# Patient Record
Sex: MALE | Race: WHITE | NOT HISPANIC OR LATINO | Employment: OTHER | URBAN - METROPOLITAN AREA
[De-identification: names, ages, dates, MRNs, and addresses within clinical notes are randomized per-mention and may not be internally consistent; named-entity substitution may affect disease eponyms.]

---

## 2018-02-05 ENCOUNTER — OFFICE VISIT (OUTPATIENT)
Dept: URGENT CARE | Facility: CLINIC | Age: 43
End: 2018-02-05
Payer: COMMERCIAL

## 2018-02-05 VITALS
SYSTOLIC BLOOD PRESSURE: 131 MMHG | OXYGEN SATURATION: 98 % | RESPIRATION RATE: 18 BRPM | TEMPERATURE: 99 F | HEART RATE: 58 BPM | BODY MASS INDEX: 29.03 KG/M2 | WEIGHT: 196 LBS | DIASTOLIC BLOOD PRESSURE: 88 MMHG | HEIGHT: 69 IN

## 2018-02-05 DIAGNOSIS — R11.0 NAUSEA IN ADULT PATIENT: ICD-10-CM

## 2018-02-05 DIAGNOSIS — B34.9 ACUTE VIRAL SYNDROME: Primary | ICD-10-CM

## 2018-02-05 DIAGNOSIS — K21.9 MILD ACID REFLUX: ICD-10-CM

## 2018-02-05 LAB
CTP QC/QA: YES
FLUAV AG NPH QL: NEGATIVE
FLUBV AG NPH QL: NEGATIVE

## 2018-02-05 PROCEDURE — 96372 THER/PROPH/DIAG INJ SC/IM: CPT | Mod: S$GLB,,, | Performed by: EMERGENCY MEDICINE

## 2018-02-05 PROCEDURE — 3008F BODY MASS INDEX DOCD: CPT | Mod: S$GLB,,, | Performed by: NURSE PRACTITIONER

## 2018-02-05 PROCEDURE — 99203 OFFICE O/P NEW LOW 30 MIN: CPT | Mod: 25,S$GLB,, | Performed by: NURSE PRACTITIONER

## 2018-02-05 PROCEDURE — 87804 INFLUENZA ASSAY W/OPTIC: CPT | Mod: QW,S$GLB,, | Performed by: NURSE PRACTITIONER

## 2018-02-05 RX ORDER — BETAMETHASONE SODIUM PHOSPHATE AND BETAMETHASONE ACETATE 3; 3 MG/ML; MG/ML
6 INJECTION, SUSPENSION INTRA-ARTICULAR; INTRALESIONAL; INTRAMUSCULAR; SOFT TISSUE
Status: COMPLETED | OUTPATIENT
Start: 2018-02-05 | End: 2018-02-05

## 2018-02-05 RX ORDER — FLUTICASONE PROPIONATE 50 MCG
2 SPRAY, SUSPENSION (ML) NASAL DAILY
Qty: 1 BOTTLE | Refills: 0 | Status: SHIPPED | OUTPATIENT
Start: 2018-02-05 | End: 2018-09-21

## 2018-02-05 RX ORDER — ONDANSETRON 4 MG/1
4 TABLET, ORALLY DISINTEGRATING ORAL EVERY 6 HOURS PRN
Qty: 20 TABLET | Refills: 0 | Status: SHIPPED | OUTPATIENT
Start: 2018-02-05 | End: 2018-09-21

## 2018-02-05 RX ADMIN — BETAMETHASONE SODIUM PHOSPHATE AND BETAMETHASONE ACETATE 6 MG: 3; 3 INJECTION, SUSPENSION INTRA-ARTICULAR; INTRALESIONAL; INTRAMUSCULAR; SOFT TISSUE at 12:02

## 2018-02-05 NOTE — PATIENT INSTRUCTIONS
Zyrtec to dry up nasal drip  Sudafed from behind pharmacy counter to help with nasal congestion.        Follow up with your doctor in a few days as needed.  Return to the urgent care or go to the ER if symptoms get worse.       Tylenol every 4 hours and/or Ibuprofen every 6 hours for fever or pain.   salt water gargles  Cold-eeze helps to reduce the duration of sore throat symptoms  Cepachol helps to numb the discomfort  Chloroseptic spray  Nasal saline spray reduces inflammation and dryness  Warm face compresses as often as you can  Vicks vapor rub at night  Flonase OTC or Nasacort OTC  Simple foods like chicken noodle soup help  Mucinex DM or use Coricidin HBP if you have hypertension  Zyrtec/Claritin/Xyzal during the day and Benadryl at night may help if allergy component   Pedialyte helps with dehydration if lacking appetite  Rest as much as you can      ABDOMINAL PAIN          Based on your visit today, the exact cause of your abdominal (stomach) pain is not certain. Your condition does not seem serious now; however, the signs of a serious problem may take more time to appear. Therefore, it is important for you to watch for any new symptoms or worsening of your condition as we discussed in the clinic.      HOME CARE:  1. Rest until your next exam. No strenuous activities.  2. Eat a diet low in fiber (called a low-residue diet). Foods allowed include refined breads, white rice, fruit and vegetable juices without pulp, tender meats. These foods will pass more easily through the intestine.  3. Avoid whole-grain foods, whole fruits and vegetables, meats, seeds and nuts, fried or fatty foods, dairy, alcohol and spicy foods until your symptoms go away.     FOLLOW UP with your doctor or this facility as instructed, or if your pain does not begin to improve in the next 24 hours.     GET PROMPT MEDICAL ATTENTION if any of the following occur:  Pain gets worse or moves to the right lower abdomen  New or worsening  vomiting or diarrhea  Swelling of the abdomen  Unable to pass stool for more than three days  New fever over 100.0º F (37.8ºC), or rising fever  Blood in vomit or bowel movements (dark red or black color)  Jaundice (yellow color of eyes and skin)  Weakness, dizziness or fainting  Chest, arm, back, neck or jaw pain  Unexpected vaginal bleeding or missed period        GERD (Adult)    The esophagus is a tube that carries food from the mouth to the stomach. A valve at the lower end of the esophagus prevents stomach acid from flowing upward. When this valve doesn't work properly, stomach contents may repeatedly flow back up (reflux) into the esophagus. This is called gastroesophageal reflux disease (GERD). GERD can irritate the esophagus. It can cause problems with swallowing or breathing. In severe cases, GERD can cause recurrent pneumonia or other serious problems.  Symptoms of reflux include burning, pressure or sharp pain in the upper abdomen or mid to lower chest. The pain can spread to the neck, back, or shoulder. There may be belching, an acid taste in the back of the throat, chronic cough, or sore throat or hoarseness. GERD symptoms often occur during the day after a big meal. They can also occur at night when lying down.   Home care  Lifestyle changes can help reduce symptoms. If needed, medicines may be prescribed. Symptoms often improve with treatment, but if treatment is stopped, the symptoms often return after a few months. So most persons with GERD will need to continue treatment.  Lifestyle changes  · Limit or avoid fatty, fried, and spicy foods, as well as coffee, chocolate, mint, and foods with high acid content such as tomatoes and citrus fruit and juices (orange, grapefruit, lemon).  · Dont eat large meals, especially at night. Frequent, smaller meals are best. Do not lie down right after eating. And dont eat anything 3 hours before going to bed.  · Avoid drinking alcohol and smoking. As much as  "possible, stay away from second hand smoke.  · If you are overweight, losing weight will reduce symptoms.   · Avoid wearing tight clothing around your stomach area.  · If your symptoms occur during sleep, use a foam wedge to elevate your upper body (not just your head.) Or, place 4" blocks under the head of your bed.  Medicines  If needed, medicines can help relieve the symptoms of GERD and prevent damage to the esophagus. Discuss a medicine plan with your healthcare provider. This may include one or more of the following medicines:  · Antacids to help neutralize the normal acids in your stomach.  · Acid blockers (H2 blockers) to decrease acid production.  · Acid inhibitors (PPIs) to decrease acid production in a different way than the blockers. They may work better, but can take a little longer to take effect.  Take an antacid 30-60 minutes after eating and at bedtime, but not at the same time as an acid blocker.  Try not to take medicines such as ibuprofen and aspirin. If you are taking aspirin for your heart or other medical reasons, talk to your healthcare provider about stopping it.  Follow-up care  Follow up with your healthcare provider or as advised by our staff.  When to seek medical advice  Call your healthcare provider if any of the following occur:  · Stomach pain gets worse or moves to the lower right abdomen (appendix area)  · Chest pain appears or gets worse, or spreads to the back, neck, shoulder, or arm  · Frequent vomiting (cant keep down liquids)  · Blood in the stool or vomit (red or black in color)  · Feeling weak or dizzy  · Fever of 100.4ºF (38ºC) or higher, or as directed by your healthcare provider  Date Last Reviewed: 6/23/2015  © 0417-5441 [employer]. [address] [place] [address]. All rights reserved. This information is not intended as a substitute for professional medical care. Always follow your healthcare professional's instructions.        Viral Syndrome " "(Adult)  A viral illness may cause a number of symptoms. The symptoms depend on the part of the body that the virus affects. If it settles in your nose, throat, and lungs, it may cause cough, sore throat, congestion, and sometimes headache. If it settles in your stomach and intestinal tract, it may cause vomiting and diarrhea. Sometimes it causes vague symptoms like "aching all over," feeling tired, loss of appetite, or fever.  A viral illness usually lasts 1 to 2 weeks, but sometimes it lasts longer. In some cases, a more serious infection can look like a viral syndrome in the first few days of the illness. You may need another exam and additional tests to know the difference. Watch for the warning signs listed below.  Home care  Follow these guidelines for taking care of yourself at home:  · If symptoms are severe, rest at home for the first 2 to 3 days.  · Stay away from cigarette smoke - both your smoke and the smoke from others.  · You may use over-the-counter acetaminophen or ibuprofen for fever, muscle aching, and headache, unless another medicine was prescribed for this. If you have chronic liver or kidney disease or ever had a stomach ulcer or GI bleeding, talk with your doctor before using these medicines. No one who is younger than 18 and ill with a fever should take aspirin. It may cause severe disease or death.  · Your appetite may be poor, so a light diet is fine. Avoid dehydration by drinking 8 to 12 8-ounce glasses of fluids each day. This may include water; orange juice; lemonade; apple, grape, and cranberry juice; clear fruit drinks; electrolyte replacement and sports drinks; and decaffeinated teas and coffee. If you have been diagnosed with a kidney disease, ask your doctor how much and what types of fluids you should drink to prevent dehydration. If you have kidney disease, drinking too much fluid can cause it build up in the your body and be dangerous to your health.  · Over-the-counter remedies " won't shorten the length of the illness but may be helpful for cough, sore throat; and nasal and sinus congestion. Don't use decongestants if you have high blood pressure.  Follow-up care  Follow up with your healthcare provider if you do not improve over the next week.  Call 911  Get emergency medical care if any of the following occur:  · Convulsion  · Feeling weak, dizzy, or like you are going to faint  · Chest pain, shortness of breath, wheezing, or difficulty breathing  When to seek medical advice  Call your healthcare provider right away if any of these occur:  · Cough with lots of colored sputum (mucus) or blood in your sputum  · Chest pain, shortness of breath, wheezing, or difficulty breathing  · Severe headache; face, neck, or ear pain  · Severe, constant pain in the lower right side of your belly (abdominal)  · Continued vomiting (cant keep liquids down)  · Frequent diarrhea (more than 5 times a day); blood (red or black color) or mucus in diarrhea  · Feeling weak, dizzy, or like you are going to faint  · Extreme thirst  · Fever of 100.4°F (38°C) or higher, or as directed by your healthcare provider  Date Last Reviewed: 9/25/2015  © 3084-6943 [employer]. [address] [place] [address], [place] [address]. All rights reserved. This information is not intended as a substitute for professional medical care. Always follow your healthcare professional's instructions.

## 2018-02-05 NOTE — PROGRESS NOTES
"Subjective:       Patient ID: James Royden Peabody IV is a 42 y.o. male.    Vitals:  height is 5' 9" (1.753 m) and weight is 88.9 kg (196 lb). His oral temperature is 98.5 °F (36.9 °C). His blood pressure is 131/88 and his pulse is 58 (abnormal). His respiration is 18 and oxygen saturation is 98%.     Chief Complaint: Sinus Problem    Pt c/o the symptoms have been going on for 2-3 days.  Pt states the abdominal cramps/acid reflux started last night. + diarrhea for the last 2 days. 2-3 episodes per day. Denies blood in stool.   + nausea denies vomiting.   + URI s/s as well. Recently traveled in an air plane.           Sinus Problem   This is a new problem. The current episode started in the past 7 days (3 days ago). The problem is unchanged. There has been no fever. The fever has been present for less than 1 day. His pain is at a severity of 7/10. The pain is moderate. Associated symptoms include congestion and coughing. Pertinent negatives include no chills, ear pain, headaches, hoarse voice, shortness of breath or sore throat. Treatments tried: mucinex, advil cold and sinus. The treatment provided mild relief.     Review of Systems   Constitution: Negative for chills, fever and malaise/fatigue.        Body aches   HENT: Positive for congestion. Negative for ear pain, hoarse voice and sore throat.    Eyes: Negative for discharge and redness.   Cardiovascular: Negative for chest pain, dyspnea on exertion and leg swelling.   Respiratory: Positive for cough. Negative for shortness of breath, sputum production and wheezing.    Musculoskeletal: Negative for myalgias.   Gastrointestinal: Positive for abdominal pain, diarrhea and nausea.        Heartburn   Neurological: Negative for headaches.       Objective:      Physical Exam   Constitutional: He is oriented to person, place, and time. He appears well-developed and well-nourished. He is cooperative.  Non-toxic appearance. He does not appear ill. No distress.   HENT: "   Head: Normocephalic and atraumatic.   Right Ear: Hearing, tympanic membrane, external ear and ear canal normal.   Left Ear: Hearing, tympanic membrane, external ear and ear canal normal.   Nose: Mucosal edema (mild) and rhinorrhea present. No nasal deformity. No epistaxis. Right sinus exhibits no maxillary sinus tenderness and no frontal sinus tenderness. Left sinus exhibits no maxillary sinus tenderness and no frontal sinus tenderness.   Mouth/Throat: Uvula is midline and mucous membranes are normal. No trismus in the jaw. Normal dentition. No uvula swelling. Posterior oropharyngeal erythema (mild) present. No oropharyngeal exudate or posterior oropharyngeal edema.   + hoarseness   + PND    Eyes: Conjunctivae and lids are normal. Pupils are equal, round, and reactive to light. No scleral icterus.   Sclera clear bilat   Neck: Trachea normal, normal range of motion, full passive range of motion without pain and phonation normal. Neck supple.   Cardiovascular: Normal rate, regular rhythm, normal heart sounds, intact distal pulses and normal pulses.    Pulmonary/Chest: Effort normal and breath sounds normal. No respiratory distress.   Abdominal: Soft. Normal appearance and bowel sounds are normal. He exhibits no distension, no abdominal bruit, no pulsatile midline mass and no mass. There is tenderness in the epigastric area. There is no rigidity, no rebound, no guarding, no tenderness at McBurney's point and negative Luciano's sign.   Musculoskeletal: Normal range of motion. He exhibits no edema or deformity.   Neurological: He is alert and oriented to person, place, and time. He has normal strength. He exhibits normal muscle tone. Coordination normal.   Skin: Skin is warm, dry and intact. He is not diaphoretic. No pallor.   Psychiatric: He has a normal mood and affect. His speech is normal and behavior is normal. Judgment and thought content normal. Cognition and memory are normal.   Nursing note and vitals  reviewed.        Results for orders placed or performed in visit on 02/05/18   POCT Influenza A/B   Result Value Ref Range    Rapid Influenza A Ag Negative Negative    Rapid Influenza B Ag Negative Negative     Acceptable Yes        Assessment:       1. Acute viral syndrome    2. Nausea in adult patient    3. Mild acid reflux        Plan:         Acute viral syndrome  -     POCT Influenza A/B  -     betamethasone acetate-betamethasone sodium phosphate injection 6 mg; Inject 1 mL (6 mg total) into the muscle one time.  -     fluticasone (FLONASE) 50 mcg/actuation nasal spray; 2 sprays (100 mcg total) by Each Nare route once daily.  Dispense: 1 Bottle; Refill: 0    Nausea in adult patient  -     ondansetron (ZOFRAN-ODT) 4 MG TbDL; Take 1 tablet (4 mg total) by mouth every 6 (six) hours as needed.  Dispense: 20 tablet; Refill: 0    Mild acid reflux  -     ranitidine (ZANTAC) 150 MG tablet; Take 1 tablet (150 mg total) by mouth 2 (two) times daily.  Dispense: 60 tablet; Refill: 1      Discussed diet.   Discussed OTC medications and warning s/s for reevaluation.     Patient Instructions   Zyrtec to dry up nasal drip  Sudafed from behind pharmacy counter to help with nasal congestion.        Follow up with your doctor in a few days as needed.  Return to the urgent care or go to the ER if symptoms get worse.       Tylenol every 4 hours and/or Ibuprofen every 6 hours for fever or pain.   salt water gargles  Cold-eeze helps to reduce the duration of sore throat symptoms  Cepachol helps to numb the discomfort  Chloroseptic spray  Nasal saline spray reduces inflammation and dryness  Warm face compresses as often as you can  Vicks vapor rub at night  Flonase OTC or Nasacort OTC  Simple foods like chicken noodle soup help  Mucinex DM or use Coricidin HBP if you have hypertension  Zyrtec/Claritin/Xyzal during the day and Benadryl at night may help if allergy component   Pedialyte helps with dehydration if lacking  appetite  Rest as much as you can      ABDOMINAL PAIN          Based on your visit today, the exact cause of your abdominal (stomach) pain is not certain. Your condition does not seem serious now; however, the signs of a serious problem may take more time to appear. Therefore, it is important for you to watch for any new symptoms or worsening of your condition as we discussed in the clinic.      HOME CARE:  1. Rest until your next exam. No strenuous activities.  2. Eat a diet low in fiber (called a low-residue diet). Foods allowed include refined breads, white rice, fruit and vegetable juices without pulp, tender meats. These foods will pass more easily through the intestine.  3. Avoid whole-grain foods, whole fruits and vegetables, meats, seeds and nuts, fried or fatty foods, dairy, alcohol and spicy foods until your symptoms go away.     FOLLOW UP with your doctor or this facility as instructed, or if your pain does not begin to improve in the next 24 hours.     GET PROMPT MEDICAL ATTENTION if any of the following occur:  Pain gets worse or moves to the right lower abdomen  New or worsening vomiting or diarrhea  Swelling of the abdomen  Unable to pass stool for more than three days  New fever over 100.0º F (37.8ºC), or rising fever  Blood in vomit or bowel movements (dark red or black color)  Jaundice (yellow color of eyes and skin)  Weakness, dizziness or fainting  Chest, arm, back, neck or jaw pain  Unexpected vaginal bleeding or missed period        GERD (Adult)    The esophagus is a tube that carries food from the mouth to the stomach. A valve at the lower end of the esophagus prevents stomach acid from flowing upward. When this valve doesn't work properly, stomach contents may repeatedly flow back up (reflux) into the esophagus. This is called gastroesophageal reflux disease (GERD). GERD can irritate the esophagus. It can cause problems with swallowing or breathing. In severe cases, GERD can cause recurrent  "pneumonia or other serious problems.  Symptoms of reflux include burning, pressure or sharp pain in the upper abdomen or mid to lower chest. The pain can spread to the neck, back, or shoulder. There may be belching, an acid taste in the back of the throat, chronic cough, or sore throat or hoarseness. GERD symptoms often occur during the day after a big meal. They can also occur at night when lying down.   Home care  Lifestyle changes can help reduce symptoms. If needed, medicines may be prescribed. Symptoms often improve with treatment, but if treatment is stopped, the symptoms often return after a few months. So most persons with GERD will need to continue treatment.  Lifestyle changes  · Limit or avoid fatty, fried, and spicy foods, as well as coffee, chocolate, mint, and foods with high acid content such as tomatoes and citrus fruit and juices (orange, grapefruit, lemon).  · Dont eat large meals, especially at night. Frequent, smaller meals are best. Do not lie down right after eating. And dont eat anything 3 hours before going to bed.  · Avoid drinking alcohol and smoking. As much as possible, stay away from second hand smoke.  · If you are overweight, losing weight will reduce symptoms.   · Avoid wearing tight clothing around your stomach area.  · If your symptoms occur during sleep, use a foam wedge to elevate your upper body (not just your head.) Or, place 4" blocks under the head of your bed.  Medicines  If needed, medicines can help relieve the symptoms of GERD and prevent damage to the esophagus. Discuss a medicine plan with your healthcare provider. This may include one or more of the following medicines:  · Antacids to help neutralize the normal acids in your stomach.  · Acid blockers (H2 blockers) to decrease acid production.  · Acid inhibitors (PPIs) to decrease acid production in a different way than the blockers. They may work better, but can take a little longer to take effect.  Take an antacid " "30-60 minutes after eating and at bedtime, but not at the same time as an acid blocker.  Try not to take medicines such as ibuprofen and aspirin. If you are taking aspirin for your heart or other medical reasons, talk to your healthcare provider about stopping it.  Follow-up care  Follow up with your healthcare provider or as advised by our staff.  When to seek medical advice  Call your healthcare provider if any of the following occur:  · Stomach pain gets worse or moves to the lower right abdomen (appendix area)  · Chest pain appears or gets worse, or spreads to the back, neck, shoulder, or arm  · Frequent vomiting (cant keep down liquids)  · Blood in the stool or vomit (red or black in color)  · Feeling weak or dizzy  · Fever of 100.4ºF (38ºC) or higher, or as directed by your healthcare provider  Date Last Reviewed: 6/23/2015  © 4251-0112 [employer]. [address] [place] [address]. All rights reserved. This information is not intended as a substitute for professional medical care. Always follow your healthcare professional's instructions.        Viral Syndrome (Adult)  A viral illness may cause a number of symptoms. The symptoms depend on the part of the body that the virus affects. If it settles in your nose, throat, and lungs, it may cause cough, sore throat, congestion, and sometimes headache. If it settles in your stomach and intestinal tract, it may cause vomiting and diarrhea. Sometimes it causes vague symptoms like "aching all over," feeling tired, loss of appetite, or fever.  A viral illness usually lasts 1 to 2 weeks, but sometimes it lasts longer. In some cases, a more serious infection can look like a viral syndrome in the first few days of the illness. You may need another exam and additional tests to know the difference. Watch for the warning signs listed below.  Home care  Follow these guidelines for taking care of yourself at home:  · If symptoms are severe, rest at home " for the first 2 to 3 days.  · Stay away from cigarette smoke - both your smoke and the smoke from others.  · You may use over-the-counter acetaminophen or ibuprofen for fever, muscle aching, and headache, unless another medicine was prescribed for this. If you have chronic liver or kidney disease or ever had a stomach ulcer or GI bleeding, talk with your doctor before using these medicines. No one who is younger than 18 and ill with a fever should take aspirin. It may cause severe disease or death.  · Your appetite may be poor, so a light diet is fine. Avoid dehydration by drinking 8 to 12 8-ounce glasses of fluids each day. This may include water; orange juice; lemonade; apple, grape, and cranberry juice; clear fruit drinks; electrolyte replacement and sports drinks; and decaffeinated teas and coffee. If you have been diagnosed with a kidney disease, ask your doctor how much and what types of fluids you should drink to prevent dehydration. If you have kidney disease, drinking too much fluid can cause it build up in the your body and be dangerous to your health.  · Over-the-counter remedies won't shorten the length of the illness but may be helpful for cough, sore throat; and nasal and sinus congestion. Don't use decongestants if you have high blood pressure.  Follow-up care  Follow up with your healthcare provider if you do not improve over the next week.  Call 911  Get emergency medical care if any of the following occur:  · Convulsion  · Feeling weak, dizzy, or like you are going to faint  · Chest pain, shortness of breath, wheezing, or difficulty breathing  When to seek medical advice  Call your healthcare provider right away if any of these occur:  · Cough with lots of colored sputum (mucus) or blood in your sputum  · Chest pain, shortness of breath, wheezing, or difficulty breathing  · Severe headache; face, neck, or ear pain  · Severe, constant pain in the lower right side of your belly  (abdominal)  · Continued vomiting (cant keep liquids down)  · Frequent diarrhea (more than 5 times a day); blood (red or black color) or mucus in diarrhea  · Feeling weak, dizzy, or like you are going to faint  · Extreme thirst  · Fever of 100.4°F (38°C) or higher, or as directed by your healthcare provider  Date Last Reviewed: 9/25/2015  © 3476-0124 [employer] [address] [place] [address], [place] [address]. All rights reserved. This information is not intended as a substitute for professional medical care. Always follow your healthcare professional's instructions.

## 2018-05-10 ENCOUNTER — OFFICE VISIT (OUTPATIENT)
Dept: URGENT CARE | Facility: CLINIC | Age: 43
End: 2018-05-10
Payer: COMMERCIAL

## 2018-05-10 VITALS
HEIGHT: 69 IN | TEMPERATURE: 99 F | RESPIRATION RATE: 16 BRPM | WEIGHT: 186 LBS | BODY MASS INDEX: 27.55 KG/M2 | DIASTOLIC BLOOD PRESSURE: 86 MMHG | OXYGEN SATURATION: 98 % | HEART RATE: 82 BPM | SYSTOLIC BLOOD PRESSURE: 134 MMHG

## 2018-05-10 DIAGNOSIS — R05.8 NOCTURNAL COUGH: ICD-10-CM

## 2018-05-10 DIAGNOSIS — R09.82 POSTNASAL DRIP: ICD-10-CM

## 2018-05-10 DIAGNOSIS — F17.200 TOBACCO DEPENDENCY: ICD-10-CM

## 2018-05-10 DIAGNOSIS — J01.00 ACUTE MAXILLARY SINUSITIS, RECURRENCE NOT SPECIFIED: Primary | ICD-10-CM

## 2018-05-10 PROCEDURE — 99214 OFFICE O/P EST MOD 30 MIN: CPT | Mod: 25,S$GLB,, | Performed by: EMERGENCY MEDICINE

## 2018-05-10 PROCEDURE — 96372 THER/PROPH/DIAG INJ SC/IM: CPT | Mod: S$GLB,,, | Performed by: EMERGENCY MEDICINE

## 2018-05-10 RX ORDER — PROMETHAZINE HYDROCHLORIDE AND CODEINE PHOSPHATE 6.25; 1 MG/5ML; MG/5ML
SOLUTION ORAL
Qty: 100 ML | Refills: 0 | Status: SHIPPED | OUTPATIENT
Start: 2018-05-10 | End: 2018-09-21

## 2018-05-10 RX ORDER — CEFDINIR 300 MG/1
300 CAPSULE ORAL EVERY 12 HOURS
Qty: 14 CAPSULE | Refills: 0 | Status: SHIPPED | OUTPATIENT
Start: 2018-05-10 | End: 2018-05-17

## 2018-05-10 RX ORDER — BETAMETHASONE SODIUM PHOSPHATE AND BETAMETHASONE ACETATE 3; 3 MG/ML; MG/ML
9 INJECTION, SUSPENSION INTRA-ARTICULAR; INTRALESIONAL; INTRAMUSCULAR; SOFT TISSUE
Status: COMPLETED | OUTPATIENT
Start: 2018-05-10 | End: 2018-05-10

## 2018-05-10 RX ADMIN — BETAMETHASONE SODIUM PHOSPHATE AND BETAMETHASONE ACETATE 9 MG: 3; 3 INJECTION, SUSPENSION INTRA-ARTICULAR; INTRALESIONAL; INTRAMUSCULAR; SOFT TISSUE at 11:05

## 2018-05-10 NOTE — PATIENT INSTRUCTIONS
Sinusitis (Antibiotic Treatment)    The sinuses are air-filled spaces within the bones of the face. They connect to the inside of the nose. Sinusitis is an inflammation of the tissue lining the sinus cavity. Sinus inflammation can occur during a cold. It can also be due to allergies to pollens and other particles in the air. Sinusitis can cause symptoms of sinus congestion and fullness. A sinus infection causes fever, headache and facial pain. There is often green or yellow drainage from the nose or into the back of the throat (post-nasal drip). You have been given antibiotics to treat this condition.  Home care:  · Take the full course of antibiotics as instructed. Do not stop taking them, even if you feel better.  · Drink plenty of water, hot tea, and other liquids. This may help thin mucus. It also may promote sinus drainage.  · Heat may help soothe painful areas of the face. Use a towel soaked in hot water. Or,  the shower and direct the hot spray onto your face. Using a vaporizer along with a menthol rub at night may also help.   · An expectorant containing guaifenesin may help thin the mucus and promote drainage from the sinuses.  · Over-the-counter decongestants may be used unless a similar medicine was prescribed. Nasal sprays work the fastest. Use one that contains phenylephrine or oxymetazoline. First blow the nose gently. Then use the spray. Do not use these medicines more often than directed on the label or symptoms may get worse. You may also use tablets containing pseudoephedrine. Avoid products that combine ingredients, because side effects may be increased. Read labels. You can also ask the pharmacist for help. (NOTE: Persons with high blood pressure should not use decongestants. They can raise blood pressure.)  · Over-the-counter antihistamines may help if allergies contributed to your sinusitis.    · Do not use nasal rinses or irrigation during an acute sinus infection, unless told to by  your health care provider. Rinsing may spread the infection to other sinuses.  · Use acetaminophen or ibuprofen to control pain, unless another pain medicine was prescribed. (If you have chronic liver or kidney disease or ever had a stomach ulcer, talk with your doctor before using these medicines. Aspirin should never be used in anyone under 18 years of age who is ill with a fever. It may cause severe liver damage.)  · Don't smoke. This can worsen symptoms.  Follow-up care  Follow up with your healthcare provider or our staff if you are not improving within the next week.  When to seek medical advice  Call your healthcare provider if any of these occur:  · Facial pain or headache becoming more severe  · Stiff neck  · Unusual drowsiness or confusion  · Swelling of the forehead or eyelids  · Vision problems, including blurred or double vision  · Fever of 100.4ºF (38ºC) or higher, or as directed by your healthcare provider  · Seizure  · Breathing problems  · Symptoms not resolving within 10 days  Date Last Reviewed: 4/13/2015  © 1081-5182 [employer]. [address] [place] [address]. All rights reserved. This information is not intended as a substitute for professional medical care. Always follow your healthcare professional's instructions.      REST AND HYDRATE WITH PLENTY OF FLUIDS  1.5 CC CELESTONE GIVEN IN CLINIC  OTC ZYRTEC D OR CLARITIN D OR ALLEGRA D. TAKE 2-3 HOURS BEFORE FLIGHT  OTC MUCINEX  CEFDINIR RX  OTC FLONASE  PROMETHAZINE WITH CODEINE RX FOR SEVERE COUGH AT NIGHT  SEE SINUSITIS SHEET  RETURN FOR ANY CONCERNS OR PROBLEMS

## 2018-05-10 NOTE — PROGRESS NOTES
"Subjective:       Patient ID: James Royden Peabody IV is a 42 y.o. male.    Vitals:    05/10/18 1132   BP: 134/86   Pulse: 82   Resp: 16   Temp: 98.5 °F (36.9 °C)   SpO2: 98%   Weight: 84.4 kg (186 lb)   Height: 5' 9" (1.753 m)       Chief Complaint: Sinus Problem    Pt states productive cough, chest congestion, sinus congestion and pressure, post nasal drip, headache, and fatigue x 2 days. NO FEVERS, NO CHILLS, IS FLYING OUT TO [place] FOR WORK AND REPORTS SEVERE COUGH AT NIGHT FRO POST-NASAL DRIP AND FACIAL PRESSURE, EAR POPPING, CONGESTION, AND LAST TIME HIS EARS WERE A MESS WHEN HE FLEW.      Sinus Problem   This is a new problem. The current episode started in the past 7 days. The problem is unchanged. There has been no fever. Associated symptoms include chills, congestion, coughing, headaches and sinus pressure. Pertinent negatives include no ear pain, hoarse voice, shortness of breath or sore throat. Treatments tried: mucinex,  The treatment provided mild relief.     Review of Systems   Constitution: Positive for chills and malaise/fatigue. Negative for fever.   HENT: Positive for congestion and sinus pressure. Negative for ear pain, hoarse voice and sore throat.    Eyes: Negative for discharge and redness.   Cardiovascular: Negative for chest pain, dyspnea on exertion and leg swelling.   Respiratory: Positive for cough and sputum production. Negative for shortness of breath and wheezing.    Musculoskeletal: Positive for myalgias.   Gastrointestinal: Negative for abdominal pain and nausea.   Neurological: Positive for headaches.       Objective:      Physical Exam   Constitutional: He is oriented to person, place, and time. He appears well-developed and well-nourished. He is cooperative.  Non-toxic appearance. He does not appear ill. No distress.   HENT:   Head: Normocephalic and atraumatic.   Right Ear: Hearing, tympanic membrane, external ear and ear canal normal.   Left Ear: Hearing, tympanic " membrane, external ear and ear canal normal.   Nose: No mucosal edema, rhinorrhea or nasal deformity. No epistaxis. Right sinus exhibits no maxillary sinus tenderness and no frontal sinus tenderness. Left sinus exhibits no maxillary sinus tenderness and no frontal sinus tenderness.   Mouth/Throat: Uvula is midline, oropharynx is clear and moist and mucous membranes are normal. No trismus in the jaw. Normal dentition. No uvula swelling. No posterior oropharyngeal erythema.   NASAL CONGESTION  TTP OF THE MAXILLARY SINUS  POSTERIOR OP MUCOSAL DRAINAGE  SEROUS FLUI BILATERAL TM, NO ERYTHEMA, NO PURULENCE   Eyes: Conjunctivae and lids are normal. No scleral icterus.   Sclera clear bilat   Neck: Trachea normal, full passive range of motion without pain and phonation normal. Neck supple.   Cardiovascular: Normal rate, regular rhythm, normal heart sounds, intact distal pulses and normal pulses.    Pulmonary/Chest: Effort normal and breath sounds normal. No respiratory distress.   Abdominal: Soft. Normal appearance and bowel sounds are normal. There is no tenderness.   Musculoskeletal: Normal range of motion. He exhibits no edema or deformity.   Neurological: He is alert and oriented to person, place, and time. He exhibits normal muscle tone. Coordination normal.   Skin: Skin is warm, dry and intact. He is not diaphoretic. No pallor.   Psychiatric: He has a normal mood and affect. His speech is normal and behavior is normal. Cognition and memory are normal.   Nursing note and vitals reviewed.      Assessment:       1. Acute maxillary sinusitis, recurrence not specified    2. Nocturnal cough    3. Postnasal drip    4. Tobacco dependency        Plan:       Marito was seen today for sinus problem.    Diagnoses and all orders for this visit:    Acute maxillary sinusitis, recurrence not specified    Nocturnal cough    Postnasal drip    Tobacco dependency  -     Ambulatory referral to Smoking Cessation Program    Other orders  -      betamethasone acetate-betamethasone sodium phosphate injection 9 mg; Inject 1.5 mLs (9 mg total) into the muscle one time.  -     cefdinir (OMNICEF) 300 MG capsule; Take 1 capsule (300 mg total) by mouth every 12 (twelve) hours.  -     promethazine-codeine 6.25-10 mg/5 ml (PHENERGAN WITH CODEINE) 6.25-10 mg/5 mL syrup; 10 ML PO QHS PRN SEVERE COUGH. MAY CAUSE SEDATION          Patient Instructions     Sinusitis (Antibiotic Treatment)    The sinuses are air-filled spaces within the bones of the face. They connect to the inside of the nose. Sinusitis is an inflammation of the tissue lining the sinus cavity. Sinus inflammation can occur during a cold. It can also be due to allergies to pollens and other particles in the air. Sinusitis can cause symptoms of sinus congestion and fullness. A sinus infection causes fever, headache and facial pain. There is often green or yellow drainage from the nose or into the back of the throat (post-nasal drip). You have been given antibiotics to treat this condition.  Home care:  · Take the full course of antibiotics as instructed. Do not stop taking them, even if you feel better.  · Drink plenty of water, hot tea, and other liquids. This may help thin mucus. It also may promote sinus drainage.  · Heat may help soothe painful areas of the face. Use a towel soaked in hot water. Or,  the shower and direct the hot spray onto your face. Using a vaporizer along with a menthol rub at night may also help.   · An expectorant containing guaifenesin may help thin the mucus and promote drainage from the sinuses.  · Over-the-counter decongestants may be used unless a similar medicine was prescribed. Nasal sprays work the fastest. Use one that contains phenylephrine or oxymetazoline. First blow the nose gently. Then use the spray. Do not use these medicines more often than directed on the label or symptoms may get worse. You may also use tablets containing pseudoephedrine. Avoid products  that combine ingredients, because side effects may be increased. Read labels. You can also ask the pharmacist for help. (NOTE: Persons with high blood pressure should not use decongestants. They can raise blood pressure.)  · Over-the-counter antihistamines may help if allergies contributed to your sinusitis.    · Do not use nasal rinses or irrigation during an acute sinus infection, unless told to by your health care provider. Rinsing may spread the infection to other sinuses.  · Use acetaminophen or ibuprofen to control pain, unless another pain medicine was prescribed. (If you have chronic liver or kidney disease or ever had a stomach ulcer, talk with your doctor before using these medicines. Aspirin should never be used in anyone under 18 years of age who is ill with a fever. It may cause severe liver damage.)  · Don't smoke. This can worsen symptoms.  Follow-up care  Follow up with your healthcare provider or our staff if you are not improving within the next week.  When to seek medical advice  Call your healthcare provider if any of these occur:  · Facial pain or headache becoming more severe  · Stiff neck  · Unusual drowsiness or confusion  · Swelling of the forehead or eyelids  · Vision problems, including blurred or double vision  · Fever of 100.4ºF (38ºC) or higher, or as directed by your healthcare provider  · Seizure  · Breathing problems  · Symptoms not resolving within 10 days  Date Last Reviewed: 4/13/2015  © 4713-0159 [employer]. [address] [place] [address]. All rights reserved. This information is not intended as a substitute for professional medical care. Always follow your healthcare professional's instructions.      REST AND HYDRATE WITH PLENTY OF FLUIDS  1.5 CC CELESTONE GIVEN IN CLINIC  OTC ZYRTEC D OR CLARITIN D OR ALLEGRA D. TAKE 2-3 HOURS BEFORE FLIGHT  OTC MUCINEX  CEFDINIR RX  OTC FLONASE  PROMETHAZINE WITH CODEINE RX FOR SEVERE COUGH AT NIGHT  SEE SINUSITIS  SHEET  RETURN FOR ANY CONCERNS OR PROBLEMS

## 2018-05-15 ENCOUNTER — TELEPHONE (OUTPATIENT)
Dept: URGENT CARE | Facility: CLINIC | Age: 43
End: 2018-05-15

## 2018-09-18 ENCOUNTER — TELEPHONE (OUTPATIENT)
Dept: INTERNAL MEDICINE | Facility: CLINIC | Age: 43
End: 2018-09-18

## 2018-09-18 NOTE — TELEPHONE ENCOUNTER
----- Message from Luna LUGO Abilio sent at 9/17/2018  5:30 PM CDT -----  Contact: PT Portal Request  Appointment Request From: James Royden Peabody IV    With Provider: Moses Betts MD [[facility]]    Preferred Date Range: 9/17/2018 - 9/21/2018    Preferred Times: Monday Afternoon, Tuesday Afternoon, Wednesday Afternoon, Thursday Afternoon    Reason for visit: Existing Patient    Comments:  I'd like to have a physical. I also have some issues with my right shoulder.

## 2018-09-19 ENCOUNTER — OFFICE VISIT (OUTPATIENT)
Dept: INTERNAL MEDICINE | Facility: CLINIC | Age: 43
End: 2018-09-19
Payer: COMMERCIAL

## 2018-09-19 VITALS
HEIGHT: 69 IN | DIASTOLIC BLOOD PRESSURE: 74 MMHG | BODY MASS INDEX: 28.14 KG/M2 | HEART RATE: 81 BPM | SYSTOLIC BLOOD PRESSURE: 116 MMHG | OXYGEN SATURATION: 95 % | WEIGHT: 190 LBS

## 2018-09-19 DIAGNOSIS — M54.6 ACUTE RIGHT-SIDED THORACIC BACK PAIN: ICD-10-CM

## 2018-09-19 DIAGNOSIS — M25.511 ACUTE PAIN OF RIGHT SHOULDER: Primary | ICD-10-CM

## 2018-09-19 DIAGNOSIS — Z72.0 TOBACCO USE: ICD-10-CM

## 2018-09-19 PROCEDURE — 3008F BODY MASS INDEX DOCD: CPT | Mod: CPTII,S$GLB,, | Performed by: FAMILY MEDICINE

## 2018-09-19 PROCEDURE — 99203 OFFICE O/P NEW LOW 30 MIN: CPT | Mod: S$GLB,,, | Performed by: FAMILY MEDICINE

## 2018-09-19 PROCEDURE — 99999 PR PBB SHADOW E&M-EST. PATIENT-LVL III: CPT | Mod: PBBFAC,,, | Performed by: FAMILY MEDICINE

## 2018-09-19 RX ORDER — CYCLOBENZAPRINE HCL 10 MG
10 TABLET ORAL 3 TIMES DAILY PRN
Qty: 90 TABLET | Refills: 0 | Status: SHIPPED | OUTPATIENT
Start: 2018-09-19 | End: 2018-09-29

## 2018-09-19 RX ORDER — NAPROXEN 500 MG/1
500 TABLET ORAL 2 TIMES DAILY
Qty: 60 TABLET | Refills: 2 | Status: SHIPPED | OUTPATIENT
Start: 2018-09-19 | End: 2021-05-10

## 2018-09-19 NOTE — TELEPHONE ENCOUNTER
Spoke with pt  Pt will see you but wants it today  Pt stated will see Dr. Marte today and then call back to see you  Advised pt that I will schedule pt for this fri 9/21 at 4pm and informed pt to call to re-schedule of can't make it

## 2018-09-19 NOTE — PATIENT INSTRUCTIONS
General Neck and Back Pain    Both neck and back pain are usually caused by injury to the muscles or ligaments of the spine. Sometimes the disks that separate each bone of the spine may cause pain by pressing on a nearby nerve. Back and neck pain may appear after a sudden twisting or bending force (such as in a car accident), or sometimes after a simple awkward movement. In either case, muscle spasm is often present and adds to the pain.  Acute neck and back pain usually gets better in 1 to 2 weeks. Pain related to disk disease, arthritis in the spinal joints or spinal stenosis (narrowing of the spinal canal) can become chronic and last for months or years.  Back and neck pain are common problems. Most people feel better in 1 or 2 weeks, and most of the rest in 1 to 2 months. Most people can remain active.  People experience and describe pain differently.  · Pain can be sharp, stabbing, shooting, aching, cramping, or burning  · Movement, standing, bending, lifting, sitting, or walking may worsen the pain  · Pain can be localized to one spot or area, or it can be more generalized  · Pain can spread or radiate upwards, downwards, to the front, or go down your arms  · Muscle spasm may occur.  Most of the time mechanical problems with the muscles or spine cause the pain. it is usually caused by an injury, whether known or not, to the muscles or ligaments. While illnesses can cause back pain, it is usually not caused by a serious illness. Pain is usually related to physical activity, whether sports, exercise, work, or normal activity. Sometimes it can occur without an identifiable cause. This can happen simply by stretching or moving wrong, without noting pain at the time. Other causes include:  · Overexertion, lifting, pushing, pulling incorrectly or too aggressively.  · Sudden twisting, bending or stretching from an accident (car or fall), or accidental movement.  · Poor posture  · Poor conditioning, lack of regular  exercise  · Spinal disc disease or arthritis  · Stress  · Pregnancy, or illness like appendicitis, bladder or kidney infection, pelvic infections   Home care  · For neck pain: Use a comfortable pillow that supports the head and keeps the spine in a neutral position. The position of the head should not be tilted forward or backward.  · When in bed, try to find a position of comfort. A firm mattress is best. Try lying flat on your back with pillows under your knees. You can also try lying on your side with your knees bent up towards your chest and a pillow between your knees.  · At first, do not try to stretch out the sore spots. If there is a strain, it is not like the good soreness you get after exercising without an injury. In this case, stretching may make it worse.  · Avoid prolonged sitting, long car rides or travel. This puts more stress on the lower back than standing or walking.  · During the first 24 to 72 hours after an injury, apply an ice pack to the painful area for 20 minutes and then remove it for 20 minutes over a period of 60 to 90 minutes or several times a day.   · You can alternate ice and heat therapies. Talk with your healthcare provider about the best treatment for your back or neck pain. As a safety precaution, do not use a heating pad at bedtime. Sleeping with a heating pad can lead to skin burns or tissue damage.  · Therapeutic massage can help relax the back and neck muscles without stretching them.  · Be aware of safe lifting methods and do not lift anything over 15 pounds until all the pain is gone.  Medications  Talk to your healthcare provider before using medicine, especially if you have other medical problems or are taking other medicines.  · You may use over-the-counter medicine to control pain, unless another pain medicine was prescribed. If you have chronic conditions like diabetes, liver or kidney disease, stomach ulcers,  gastrointestinal bleeding, or are taking blood thinner  medicines.  · Be careful if you are given pain medicines, narcotics, or medicine for muscle spasm. They can cause drowsiness, and can affect your coordination, reflexes, and judgment. Do not drive or operate heavy machinery.  Follow-up care  Follow up with your healthcare provider, or as advised. Physical therapy or further tests may be needed.  If X-rays were taken, you will be notified of any new findings that may affect your care.  Call 911  Seek emergency medical care if any of the following occur:  · Trouble breathing  · Confusion  · Very drowsy or trouble awakening  · Fainting or loss of consciousness  · Rapid or very slow heart rate  · Loss of bowel or bladder control  When to seek medical advice  Call your healthcare provider right away if any of these occur:  · Pain becomes worse or spreads into your arms or legs  · Weakness, numbness or pain in one or both arms or legs  · Numbness in the groin area  · Difficulty walking  · Fever of 100.4ºF (38ºC) or higher, or as directed by your healthcare provider  Date Last Reviewed: 7/1/2016 © 2000-2017 [employer]. [address] [place], [place] [id number]. All rights reserved. This information is not intended as a substitute for professional medical care. Always follow your healthcare professional's instructions.        General Neck and Back Pain    Both neck and back pain are usually caused by injury to the muscles or ligaments of the spine. Sometimes the disks that separate each bone of the spine may cause pain by pressing on a nearby nerve. Back and neck pain may appear after a sudden twisting or bending force (such as in a car accident), or sometimes after a simple awkward movement. In either case, muscle spasm is often present and adds to the pain.  Acute neck and back pain usually gets better in 1 to 2 weeks. Pain related to disk disease, arthritis in the spinal joints or spinal stenosis (narrowing of the spinal canal) can become chronic and last  for months or years.  Back and neck pain are common problems. Most people feel better in 1 or 2 weeks, and most of the rest in 1 to 2 months. Most people can remain active.  People experience and describe pain differently.  · Pain can be sharp, stabbing, shooting, aching, cramping, or burning  · Movement, standing, bending, lifting, sitting, or walking may worsen the pain  · Pain can be localized to one spot or area, or it can be more generalized  · Pain can spread or radiate upwards, downwards, to the front, or go down your arms  · Muscle spasm may occur.  Most of the time mechanical problems with the muscles or spine cause the pain. it is usually caused by an injury, whether known or not, to the muscles or ligaments. While illnesses can cause back pain, it is usually not caused by a serious illness. Pain is usually related to physical activity, whether sports, exercise, work, or normal activity. Sometimes it can occur without an identifiable cause. This can happen simply by stretching or moving wrong, without noting pain at the time. Other causes include:  · Overexertion, lifting, pushing, pulling incorrectly or too aggressively.  · Sudden twisting, bending or stretching from an accident (car or fall), or accidental movement.  · Poor posture  · Poor conditioning, lack of regular exercise  · Spinal disc disease or arthritis  · Stress  · Pregnancy, or illness like appendicitis, bladder or kidney infection, pelvic infections   Home care  · For neck pain: Use a comfortable pillow that supports the head and keeps the spine in a neutral position. The position of the head should not be tilted forward or backward.  · When in bed, try to find a position of comfort. A firm mattress is best. Try lying flat on your back with pillows under your knees. You can also try lying on your side with your knees bent up towards your chest and a pillow between your knees.  · At first, do not try to stretch out the sore spots. If there is  a strain, it is not like the good soreness you get after exercising without an injury. In this case, stretching may make it worse.  · Avoid prolonged sitting, long car rides or travel. This puts more stress on the lower back than standing or walking.  · During the first 24 to 72 hours after an injury, apply an ice pack to the painful area for 20 minutes and then remove it for 20 minutes over a period of 60 to 90 minutes or several times a day.   · You can alternate ice and heat therapies. Talk with your healthcare provider about the best treatment for your back or neck pain. As a safety precaution, do not use a heating pad at bedtime. Sleeping with a heating pad can lead to skin burns or tissue damage.  · Therapeutic massage can help relax the back and neck muscles without stretching them.  · Be aware of safe lifting methods and do not lift anything over 15 pounds until all the pain is gone.  Medications  Talk to your healthcare provider before using medicine, especially if you have other medical problems or are taking other medicines.  · You may use over-the-counter medicine to control pain, unless another pain medicine was prescribed. If you have chronic conditions like diabetes, liver or kidney disease, stomach ulcers,  gastrointestinal bleeding, or are taking blood thinner medicines.  · Be careful if you are given pain medicines, narcotics, or medicine for muscle spasm. They can cause drowsiness, and can affect your coordination, reflexes, and judgment. Do not drive or operate heavy machinery.  Follow-up care  Follow up with your healthcare provider, or as advised. Physical therapy or further tests may be needed.  If X-rays were taken, you will be notified of any new findings that may affect your care.  Call 911  Seek emergency medical care if any of the following occur:  · Trouble breathing  · Confusion  · Very drowsy or trouble awakening  · Fainting or loss of consciousness  · Rapid or very slow heart  rate  · Loss of bowel or bladder control  When to seek medical advice  Call your healthcare provider right away if any of these occur:  · Pain becomes worse or spreads into your arms or legs  · Weakness, numbness or pain in one or both arms or legs  · Numbness in the groin area  · Difficulty walking  · Fever of 100.4ºF (38ºC) or higher, or as directed by your healthcare provider  Date Last Reviewed: 7/1/2016 © 2000-2017 [employer]. [address] [place] [id number]. All rights reserved. This information is not intended as a substitute for professional medical care. Always follow your healthcare professional's instructions.        Back Care Tips    Caring for your back  These are things you can do to prevent a recurrence of acute back pain and to reduce symptoms from chronic back pain:  · Maintain a healthy weight. If you are overweight, losing weight will help most types of back pain.  · Exercise is an important part of recovery from most types of back pain. The muscles behind and in front of the spine support the back. This means strengthening both the back muscles and the abdominal muscles will provide better support for your spine.   · Swimming and brisk walking are good overall exercises to improve your fitness level.  · Practice safe lifting methods (below).  · Practice good posture when sitting, standing and walking. Avoid prolonged sitting. This puts more stress on the lower back than standing or walking.  · Wear quality shoes with sufficient arch support. Foot and ankle alignment can affect back symptoms. Women should avoid wearing high heels.  · Therapeutic massage can help relax the back muscles without stretching them.  · During the first 24 to 72 hours after an acute injury or flare-up of chronic back pain, apply an ice pack to the painful area for 20 minutes and then remove it for 20 minutes, over a period of 60 to 90 minutes, or several times a day. As a safety precaution, do  not use a heating pad at bedtime. Sleeping on a heating pad can lead to skin burns or tissue damage.  · You can alternate ice and heat therapies.  Medications  Talk to your healthcare provider before using medicines, especially if you have other medical problems or are taking other medicines.  · You may use acetaminophen or ibuprofen to control pain, unless your healthcare provider prescribed other pain medicine. If you have chronic conditions like diabetes, liver or kidney disease, stomach ulcers, or gastrointestinal bleeding, or are taking blood thinners, talk with your healthcare provider before taking any medicines.  · Be careful if you are given prescription pain medicines, narcotics, or medicine for muscle spasm. They can cause drowsiness, affect your coordination, reflexes, and judgment. Do not drive or operate heavy machinery while taking these types of medicines. Take prescription pain medicine only as prescribed by your healthcare provider.  Lumbar stretch  Here is a simple stretching exercise that will help relax muscle spasm and keep your back more limber. If exercise makes your back pain worse, dont do it.  · Lie on your back with your knees bent and both feet on the ground.  · Slowly raise your left knee to your chest as you flatten your lower back against the floor. Hold for 5 seconds.  · Relax and repeat the exercise with your right knee.  · Do 10 of these exercises for each leg.  Safe lifting method  · Dont bend over at the waist to lift an object off the floor.  Instead, bend your knees and hips in a squat.   · Keep your back and head upright  · Hold the object close to your body, directly in front of you.  · Straighten your legs to lift the object.   · Lower the object to the floor in the reverse fashion.  · If you must slide something across the floor, push it.  Posture tips  Sitting  Sit in chairs with straight backs or low-back support. Keep your knees lower than your hips, with your feet  flat on the floor.  When driving, sit up straight. Adjust the seat forward so you are not leaning toward the steering wheel.  A small pillow or rolled towel behind your lower back may help if you are driving long distances.   Standing  When standing for long periods, shift most of your weight to one leg at a time. Alternate legs every few minutes.   Sleeping  The best way to sleep is on your side with your knees bent. Put a low pillow under your head to support your neck in a neutral spine position. Avoid thick pillows that bend your neck to one side. Put a pillow between your legs to further relax your lower back. If you sleep on your back, put pillows under your knees to support your legs in a slightly flexed position. Use a firm mattress. If your mattress sags, replace it, or use a 1/2-inch plywood board under the mattress to add support.  Follow-up care  Follow up with your healthcare provider, or as advised.  If X-rays, a CT scan or an MRI scan were taken, they will be reviewed by a radiologist. You will be notified of any new findings that may affect your care.  Call 911  Seek emergency medical care if any of the following occur:  · Trouble breathing  · Confusion  · Very drowsy  · Fainting or loss of consciousness  · Rapid or very slow heart rate  · Loss of  bowel or bladder control  When to seek medical care  Call your healthcare provider if any of the following occur:  · Pain becomes worse or spreads to your arms or legs  · Weakness or numbness in one or both arms or legs  · Numbness in the groin area  Date Last Reviewed: 6/1/2016  © 0609-0809 [employer]. [address] [place] [address], [place] [address]. All rights reserved. This information is not intended as a substitute for professional medical care. Always follow your healthcare professional's instructions.

## 2018-09-19 NOTE — PROGRESS NOTES
Subjective:      Patient ID: James Royden Peabody IV is a 43 y.o. male.    Chief Complaint: Shoulder Pain      HPI:  James Peabody IV is a 43 year old male with hyperlipidemia and history of lateral epicondylitis who presents to clinic today with a chief complaint of right shoulder, back, and neck pain.    Right shoulder pain; right sided back pain; neck pain:  Began approximately 3 weeks ago.  Gradually worsening.  States he started working out more around the time the symptoms developed, was doing weighted rope work out which could have contributed to his symptoms but denies any known specific inciting event or trauma.  Sharp in sensation at times.  Pain is localized to the area between his right shoulder blade and spine; radiates up towards the neck and shoulder.  Endorses associated muscle tightness and cramping.  Endorses associated weakness but states it is hard to describe how he feels weak.  Does not do any regular heavy lifting.  Pain is constant, muscle tightness is intermittent.  Has tried Advil without significant improvement.  Has also tried acupuncture which did help with the muscle tightness initially.    Tobacco use:  Smokes 1 pack per day; does not wish to quit or cut down currently.      Past Medical History:   Diagnosis Date    Hyperlipidemia        Past Surgical History:   Procedure Laterality Date    HERNIA REPAIR         History reviewed. No pertinent family history.    Social History     Socioeconomic History    Marital status:      Spouse name: None    Number of children: None    Years of education: None    Highest education level: None   Social Needs    Financial resource strain: None    Food insecurity - worry: None    Food insecurity - inability: None    Transportation needs - medical: None    Transportation needs - non-medical: None   Occupational History    None   Tobacco Use    Smoking status: Current Every Day Smoker     Types: Cigarettes    Smokeless tobacco:  "Never Used   Substance and Sexual Activity    Alcohol use: Yes    Drug use: No    Sexual activity: None   Other Topics Concern    None   Social History Narrative     and repair. M x 9, 3 kids       Review of Systems   Constitutional: Negative for chills, fatigue and fever.   HENT: Negative for congestion, hearing loss, nosebleeds, rhinorrhea, sore throat and trouble swallowing.    Eyes: Negative for pain and visual disturbance.   Respiratory: Negative for cough, shortness of breath and wheezing.    Cardiovascular: Negative for chest pain and palpitations.   Gastrointestinal: Negative for abdominal distention, abdominal pain, constipation, diarrhea, nausea and vomiting.   Genitourinary: Negative for difficulty urinating, dysuria, frequency, hematuria and urgency.   Musculoskeletal: Positive for arthralgias, back pain, myalgias and neck pain.   Skin: Negative for color change and rash.   Neurological: Negative for dizziness, syncope, speech difficulty, weakness, numbness and headaches.   Psychiatric/Behavioral: Negative for agitation, behavioral problems and confusion. The patient is not nervous/anxious.      Objective:     Vitals:    09/19/18 1308   BP: 116/74   BP Location: Left arm   Patient Position: Sitting   BP Method: Large (Manual)   Pulse: 81   SpO2: 95%   Weight: 86.2 kg (190 lb)   Height: 5' 9" (1.753 m)       Physical Exam   Constitutional: He appears well-developed and well-nourished. He is cooperative. No distress.   HENT:   Head: Normocephalic and atraumatic.   Right Ear: Hearing and external ear normal.   Left Ear: Hearing and external ear normal.   Nose: Nose normal. No rhinorrhea. No epistaxis.   Mouth/Throat: Oropharynx is clear and moist and mucous membranes are normal. No oral lesions.   Eyes: Conjunctivae, EOM and lids are normal. Pupils are equal, round, and reactive to light. Right eye exhibits no discharge. Left eye exhibits no discharge.   Neck: Trachea normal and normal range " of motion. Neck supple. No tracheal deviation present.   Cardiovascular: Normal rate, regular rhythm and normal heart sounds. Exam reveals no gallop and no friction rub.   No murmur heard.  Pulmonary/Chest: Effort normal and breath sounds normal. No respiratory distress. He has no wheezes. He has no rales.   Abdominal: Soft. Bowel sounds are normal. He exhibits no distension. There is no tenderness. There is no rebound and no guarding.   Musculoskeletal: Normal range of motion. He exhibits no edema or deformity.        Right shoulder: He exhibits tenderness. He exhibits normal range of motion, no bony tenderness, no swelling, no effusion, no crepitus, no deformity and normal strength.        Arms:  Lymphadenopathy:     He has no cervical adenopathy.   Neurological: He is alert. No cranial nerve deficit. He exhibits normal muscle tone.   Skin: Skin is warm and dry. No rash noted.   Psychiatric: He has a normal mood and affect. His speech is normal and behavior is normal. Judgment and thought content normal. Cognition and memory are normal.   Nursing note and vitals reviewed.     Assessment:      1. Acute pain of right shoulder    2. Acute right-sided thoracic back pain      Plan:   Marito was seen today for shoulder pain.    Diagnoses and all orders for this visit:    Acute pain of right shoulder; Acute right-sided thoracic back pain  -     Start cyclobenzaprine (FLEXERIL) 10 MG tablet; Take 1 tablet (10 mg total) by mouth 3 (three) times daily as needed for Muscle spasms and naproxen (NAPROSYN) 500 MG tablet; Take 1 tablet (500 mg total) by mouth 2 (two) times daily with food as needed for pain.  Avoid exacerbating activities.  Recommended alternating warm and cold compresses.  Avoid heavy lifting.  To return to clinic in 1 month if no improvement; consider imaging vs. physical therapy referral at that time.    Tobacco use        -     Pre-contemplative stage.  Counseled patient on the importance of tobacco  cessation.  Instructed patient to call or return to clinic if he would like to discuss cessation strategies.

## 2018-09-21 ENCOUNTER — OFFICE VISIT (OUTPATIENT)
Dept: INTERNAL MEDICINE | Facility: CLINIC | Age: 43
End: 2018-09-21
Attending: FAMILY MEDICINE
Payer: COMMERCIAL

## 2018-09-21 VITALS
WEIGHT: 192.38 LBS | HEART RATE: 76 BPM | SYSTOLIC BLOOD PRESSURE: 120 MMHG | BODY MASS INDEX: 28.49 KG/M2 | TEMPERATURE: 99 F | OXYGEN SATURATION: 95 % | HEIGHT: 69 IN | DIASTOLIC BLOOD PRESSURE: 80 MMHG

## 2018-09-21 DIAGNOSIS — Z00.00 ANNUAL PHYSICAL EXAM: Primary | ICD-10-CM

## 2018-09-21 DIAGNOSIS — E78.5 HYPERLIPIDEMIA, UNSPECIFIED HYPERLIPIDEMIA TYPE: ICD-10-CM

## 2018-09-21 DIAGNOSIS — G47.33 OSA (OBSTRUCTIVE SLEEP APNEA): ICD-10-CM

## 2018-09-21 DIAGNOSIS — F17.200 SMOKER: ICD-10-CM

## 2018-09-21 DIAGNOSIS — K43.9 VENTRAL HERNIA WITHOUT OBSTRUCTION OR GANGRENE: ICD-10-CM

## 2018-09-21 PROCEDURE — 99396 PREV VISIT EST AGE 40-64: CPT | Mod: S$GLB,,, | Performed by: FAMILY MEDICINE

## 2018-09-21 PROCEDURE — 99999 PR PBB SHADOW E&M-EST. PATIENT-LVL IV: CPT | Mod: PBBFAC,,, | Performed by: FAMILY MEDICINE

## 2018-09-21 NOTE — PROGRESS NOTES
Subjective:       Patient ID: James Royden Peabody IV is a 43 y.o. male.    Chief Complaint: Annual Exam    Established patient for an annual wellness check/physical exam and also chronic disease management. Specific complaints - see dictation and please see ROS.  P, S, Fm, Soc Hx's; Meds, allergies reviewed and reconciled.  Health maintenance file reviewed and addressed items due.        Review of Systems   Constitutional: Negative for chills, fatigue, fever and unexpected weight change.   HENT: Negative for congestion and trouble swallowing.    Eyes: Negative for redness and visual disturbance.   Respiratory: Negative for cough, chest tightness and shortness of breath.    Cardiovascular: Negative for chest pain, palpitations and leg swelling.   Gastrointestinal: Negative for abdominal pain and blood in stool.   Genitourinary: Negative for difficulty urinating and hematuria.   Musculoskeletal: Positive for arthralgias and back pain. Negative for gait problem, joint swelling, myalgias and neck pain.   Skin: Negative for color change and rash.   Neurological: Negative for tremors, speech difficulty, weakness, numbness and headaches.   Hematological: Negative for adenopathy. Does not bruise/bleed easily.   Psychiatric/Behavioral: Negative for behavioral problems, confusion and sleep disturbance. The patient is not nervous/anxious.        Objective:      Physical Exam   Constitutional: He appears well-developed and well-nourished.   HENT:   Head: Normocephalic.   Right Ear: Tympanic membrane, external ear and ear canal normal.   Left Ear: Tympanic membrane, external ear and ear canal normal.   Mouth/Throat: Oropharynx is clear and moist.   Eyes: Pupils are equal, round, and reactive to light.   Neck: Normal range of motion. Neck supple. Carotid bruit is not present. No thyromegaly present.   Cardiovascular: Normal rate, regular rhythm, normal heart sounds and intact distal pulses. Exam reveals no gallop and no friction  rub.   No murmur heard.  Pulmonary/Chest: Effort normal and breath sounds normal.   Abdominal: Soft. He exhibits no distension and no mass. There is no tenderness. There is no rebound and no guarding. A hernia is present.   Ventral hernia   Musculoskeletal: Normal range of motion. He exhibits no edema or tenderness.   Lymphadenopathy:     He has no cervical adenopathy.   Neurological: He is alert. He has normal strength. He displays normal reflexes. No cranial nerve deficit or sensory deficit. Coordination and gait normal.   Skin: Skin is warm and dry.   Psychiatric: He has a normal mood and affect. His behavior is normal. Judgment and thought content normal.   Nursing note and vitals reviewed.      Assessment:       1. Annual physical exam    2. Hyperlipidemia, unspecified hyperlipidemia type    3. Smoker    4. ANIBAL (obstructive sleep apnea)    5. Ventral hernia without obstruction or gangrene        Plan:   Marito was seen today for annual exam.    Diagnoses and all orders for this visit:    Annual physical exam  -     Lipid panel; Future  -     TSH; Future  -     Comprehensive metabolic panel; Future    Hyperlipidemia, unspecified hyperlipidemia type  -     Lipid panel; Future    Smoker    ANIBAL (obstructive sleep apnea)  -     Ambulatory consult to Sleep Disorders    Ventral hernia without obstruction or gangrene  -     Ambulatory referral to General Surgery      See meds, orders, follow up, routing and instructions sections of encounter.  This is a patient in for physical examination.  He had seen one of our other   practitioners for right shoulder pain recently.  He has not seen me in some   period of time.  Not amenable to smoking cessation.  We will need to recheck his   lipids as he is not taking his medications at this time.  Follow up in one   year.  Further recommendations to follow laboratory.      UNIQUE/KRISTINA  dd: 09/21/2018 18:07:32 (CDT)  td: 09/22/2018 05:23:39 (CDT)  Doc ID   #[id number]  Job ID #[id number]    CC:

## 2018-09-25 ENCOUNTER — LAB VISIT (OUTPATIENT)
Dept: LAB | Facility: HOSPITAL | Age: 43
End: 2018-09-25
Attending: FAMILY MEDICINE
Payer: COMMERCIAL

## 2018-09-25 DIAGNOSIS — E78.5 HYPERLIPIDEMIA, UNSPECIFIED HYPERLIPIDEMIA TYPE: ICD-10-CM

## 2018-09-25 DIAGNOSIS — Z00.00 ANNUAL PHYSICAL EXAM: ICD-10-CM

## 2018-09-25 LAB
ALBUMIN SERPL BCP-MCNC: 3.8 G/DL
ALP SERPL-CCNC: 59 U/L
ALT SERPL W/O P-5'-P-CCNC: 29 U/L
ANION GAP SERPL CALC-SCNC: 7 MMOL/L
AST SERPL-CCNC: 25 U/L
BILIRUB SERPL-MCNC: 0.6 MG/DL
BUN SERPL-MCNC: 21 MG/DL
CALCIUM SERPL-MCNC: 9 MG/DL
CHLORIDE SERPL-SCNC: 108 MMOL/L
CHOLEST SERPL-MCNC: 232 MG/DL
CHOLEST/HDLC SERPL: 4.5 {RATIO}
CO2 SERPL-SCNC: 26 MMOL/L
CREAT SERPL-MCNC: 0.9 MG/DL
EST. GFR  (AFRICAN AMERICAN): >60 ML/MIN/1.73 M^2
EST. GFR  (NON AFRICAN AMERICAN): >60 ML/MIN/1.73 M^2
GLUCOSE SERPL-MCNC: 87 MG/DL
HDLC SERPL-MCNC: 51 MG/DL
HDLC SERPL: 22 %
LDLC SERPL CALC-MCNC: 154.4 MG/DL
NONHDLC SERPL-MCNC: 181 MG/DL
POTASSIUM SERPL-SCNC: 4.3 MMOL/L
PROT SERPL-MCNC: 6.3 G/DL
SODIUM SERPL-SCNC: 141 MMOL/L
TRIGL SERPL-MCNC: 133 MG/DL
TSH SERPL DL<=0.005 MIU/L-ACNC: 1.87 UIU/ML

## 2018-09-25 PROCEDURE — 80053 COMPREHEN METABOLIC PANEL: CPT

## 2018-09-25 PROCEDURE — 36415 COLL VENOUS BLD VENIPUNCTURE: CPT

## 2018-09-25 PROCEDURE — 80061 LIPID PANEL: CPT

## 2018-09-25 PROCEDURE — 84443 ASSAY THYROID STIM HORMONE: CPT

## 2019-04-01 ENCOUNTER — OFFICE VISIT (OUTPATIENT)
Dept: URGENT CARE | Facility: CLINIC | Age: 44
End: 2019-04-01
Payer: COMMERCIAL

## 2019-04-01 VITALS
BODY MASS INDEX: 28.44 KG/M2 | SYSTOLIC BLOOD PRESSURE: 105 MMHG | RESPIRATION RATE: 16 BRPM | DIASTOLIC BLOOD PRESSURE: 70 MMHG | HEIGHT: 69 IN | TEMPERATURE: 99 F | HEART RATE: 73 BPM | WEIGHT: 192 LBS | OXYGEN SATURATION: 99 %

## 2019-04-01 DIAGNOSIS — J02.9 EXUDATIVE PHARYNGITIS: ICD-10-CM

## 2019-04-01 DIAGNOSIS — B96.89 ACUTE BACTERIAL SINUSITIS: Primary | ICD-10-CM

## 2019-04-01 DIAGNOSIS — J06.9 UPPER RESPIRATORY TRACT INFECTION, UNSPECIFIED TYPE: ICD-10-CM

## 2019-04-01 DIAGNOSIS — J01.90 ACUTE BACTERIAL SINUSITIS: Primary | ICD-10-CM

## 2019-04-01 LAB
CTP QC/QA: YES
CTP QC/QA: YES
FLUAV AG NPH QL: NEGATIVE
FLUBV AG NPH QL: NEGATIVE
S PYO RRNA THROAT QL PROBE: NEGATIVE

## 2019-04-01 PROCEDURE — 87804 POCT INFLUENZA A/B: ICD-10-PCS | Mod: QW,S$GLB,, | Performed by: NURSE PRACTITIONER

## 2019-04-01 PROCEDURE — 3008F BODY MASS INDEX DOCD: CPT | Mod: CPTII,S$GLB,, | Performed by: NURSE PRACTITIONER

## 2019-04-01 PROCEDURE — 87880 POCT RAPID STREP A: ICD-10-PCS | Mod: QW,S$GLB,, | Performed by: NURSE PRACTITIONER

## 2019-04-01 PROCEDURE — 87880 STREP A ASSAY W/OPTIC: CPT | Mod: QW,S$GLB,, | Performed by: NURSE PRACTITIONER

## 2019-04-01 PROCEDURE — 87804 INFLUENZA ASSAY W/OPTIC: CPT | Mod: QW,S$GLB,, | Performed by: NURSE PRACTITIONER

## 2019-04-01 PROCEDURE — 99214 OFFICE O/P EST MOD 30 MIN: CPT | Mod: S$GLB,,, | Performed by: NURSE PRACTITIONER

## 2019-04-01 PROCEDURE — 3008F PR BODY MASS INDEX (BMI) DOCUMENTED: ICD-10-PCS | Mod: CPTII,S$GLB,, | Performed by: NURSE PRACTITIONER

## 2019-04-01 PROCEDURE — 99214 PR OFFICE/OUTPT VISIT, EST, LEVL IV, 30-39 MIN: ICD-10-PCS | Mod: S$GLB,,, | Performed by: NURSE PRACTITIONER

## 2019-04-01 RX ORDER — AMOXICILLIN AND CLAVULANATE POTASSIUM 875; 125 MG/1; MG/1
1 TABLET, FILM COATED ORAL 2 TIMES DAILY
Qty: 20 TABLET | Refills: 0 | Status: SHIPPED | OUTPATIENT
Start: 2019-04-01 | End: 2019-04-11

## 2019-04-01 RX ORDER — PREDNISONE 20 MG/1
20 TABLET ORAL DAILY
Qty: 3 TABLET | Refills: 0 | Status: SHIPPED | OUTPATIENT
Start: 2019-04-01 | End: 2019-04-04

## 2019-04-01 NOTE — PROGRESS NOTES
"Subjective:       Patient ID: James Royden Peabody IV is a 43 y.o. male.    Vitals:    04/01/19 1011   BP: 105/70   Pulse: 73   Resp: 16   Temp: 98.5 °F (36.9 °C)   TempSrc: Oral   SpO2: 99%   Weight: 87.1 kg (192 lb)   Height: 5' 9" (1.753 m)       Chief Complaint: URI    Pt states x1 week subjective fever, productive cough, congestion, post nasal drip.     URI    This is a new problem. The current episode started in the past 7 days. The problem has been gradually worsening. Associated symptoms include congestion, coughing and a sore throat. Pertinent negatives include no abdominal pain, chest pain, ear pain, headaches, nausea or wheezing. Treatments tried: dayquil  The treatment provided mild relief.     Review of Systems   Constitution: Negative for chills, fever and malaise/fatigue.   HENT: Positive for congestion and sore throat. Negative for ear pain and hoarse voice.    Eyes: Negative for discharge and redness.   Cardiovascular: Negative for chest pain, dyspnea on exertion and leg swelling.   Respiratory: Positive for cough. Negative for shortness of breath, sputum production and wheezing.    Musculoskeletal: Negative for myalgias.   Gastrointestinal: Negative for abdominal pain and nausea.   Neurological: Negative for headaches.       Objective:      Physical Exam   Constitutional: He is oriented to person, place, and time. He appears well-developed and well-nourished. He is cooperative.  Non-toxic appearance. He appears ill. No distress.   HENT:   Head: Normocephalic and atraumatic.   Right Ear: Hearing, tympanic membrane, external ear and ear canal normal.   Left Ear: Hearing, tympanic membrane, external ear and ear canal normal.   Nose: No mucosal edema, rhinorrhea or nasal deformity. No epistaxis. Right sinus exhibits no maxillary sinus tenderness and no frontal sinus tenderness. Left sinus exhibits maxillary sinus tenderness and frontal sinus tenderness.   Mouth/Throat: Uvula is midline and mucous " membranes are normal. No trismus in the jaw. Normal dentition. No uvula swelling. Posterior oropharyngeal erythema present. Tonsils are 3+ on the right. Tonsils are 3+ on the left. Tonsillar exudate.   Eyes: Conjunctivae and lids are normal. No scleral icterus.   Sclera clear bilat   Neck: Trachea normal, full passive range of motion without pain and phonation normal. Neck supple.   Cardiovascular: Normal rate, regular rhythm, normal heart sounds, intact distal pulses and normal pulses.   Pulmonary/Chest: Effort normal and breath sounds normal. No respiratory distress.   Purulent cough present   Abdominal: Soft. Normal appearance and bowel sounds are normal. He exhibits no distension. There is no tenderness.   Musculoskeletal: Normal range of motion. He exhibits no edema or deformity.   Neurological: He is alert and oriented to person, place, and time. He exhibits normal muscle tone. Coordination normal.   Skin: Skin is warm, dry and intact. He is not diaphoretic. No pallor.   Psychiatric: He has a normal mood and affect. His speech is normal and behavior is normal. Judgment and thought content normal. Cognition and memory are normal.   Nursing note and vitals reviewed.      Results for orders placed or performed in visit on 04/01/19   POCT Influenza A/B   Result Value Ref Range    Rapid Influenza A Ag Negative Negative    Rapid Influenza B Ag Negative Negative     Acceptable Yes    POCT rapid strep A   Result Value Ref Range    Rapid Strep A Screen Negative Negative     Acceptable Yes      Assessment:       1. Acute bacterial sinusitis    2. Upper respiratory tract infection, unspecified type    3. Exudative pharyngitis        Plan:       Marito was seen today for uri.    Diagnoses and all orders for this visit:    Acute bacterial sinusitis  -     amoxicillin-clavulanate 875-125mg (AUGMENTIN) 875-125 mg per tablet; Take 1 tablet by mouth 2 (two) times daily. for 10 days  -      predniSONE (DELTASONE) 20 MG tablet; Take 1 tablet (20 mg total) by mouth once daily. for 3 days    Upper respiratory tract infection, unspecified type  -     POCT Influenza A/B    Exudative pharyngitis  -     POCT rapid strep A      Patient Instructions   You can try breathe right strips at night to help you breathe.  A cool mist humidifier in bedroom may help with cough and relieve stuffy nose.     Sore throat:  Lozenge, hard candy or honey.      Sinus rinses DO NOT USE TAP WATER, if you must, water must be a rolling boil for 1 minute, let it cool, then use.  May use distilled water, or over the counter nasal saline rinses.  Vics vapor rub in shower to help open nasal passages.  May use nasal gel to keep passages moisturized.  May use Nasal saline sprays during the day for added relief of congestion.   For those who go to the gym, please do not use the sauna or steam room now to clear sinuses.    During pollen season, change shirt if you are outside for a while when you go in.  Also wash your face.  Do not touch your face with your hands.  Wash your hands often in general while ill, avoid face contact with hands.     Over the counter you can use Tylenol (acetominophen) or Ibuprofen for your minor aches and pains as long as you have no contraindications.    Good nutrition. Lots of rest. Plenty of fluids      Sinusitis (Antibiotic Treatment)    The sinuses are air-filled spaces within the bones of the face. They connect to the inside of the nose. Sinusitis is an inflammation of the tissue lining the sinus cavity. Sinus inflammation can occur during a cold. It can also be due to allergies to pollens and other particles in the air. Sinusitis can cause symptoms of sinus congestion and fullness. A sinus infection causes fever, headache and facial pain. There is often green or yellow drainage from the nose or into the back of the throat (post-nasal drip). You have been given antibiotics to treat this condition.  Home  care:  · Take the full course of antibiotics as instructed. Do not stop taking them, even if you feel better.  · Drink plenty of water, hot tea, and other liquids. This may help thin mucus. It also may promote sinus drainage.  · Heat may help soothe painful areas of the face. Use a towel soaked in hot water. Or,  the shower and direct the hot spray onto your face. Using a vaporizer along with a menthol rub at night may also help.   · An expectorant containing guaifenesin may help thin the mucus and promote drainage from the sinuses.  · Over-the-counter decongestants may be used unless a similar medicine was prescribed. Nasal sprays work the fastest. Use one that contains phenylephrine or oxymetazoline. First blow the nose gently. Then use the spray. Do not use these medicines more often than directed on the label or symptoms may get worse. You may also use tablets containing pseudoephedrine. Avoid products that combine ingredients, because side effects may be increased. Read labels. You can also ask the pharmacist for help. (NOTE: Persons with high blood pressure should not use decongestants. They can raise blood pressure.)  · Over-the-counter antihistamines may help if allergies contributed to your sinusitis.    · Do not use nasal rinses or irrigation during an acute sinus infection, unless told to by your health care provider. Rinsing may spread the infection to other sinuses.  · Use acetaminophen or ibuprofen to control pain, unless another pain medicine was prescribed. (If you have chronic liver or kidney disease or ever had a stomach ulcer, talk with your doctor before using these medicines. Aspirin should never be used in anyone under 18 years of age who is ill with a fever. It may cause severe liver damage.)  · Don't smoke. This can worsen symptoms.  Follow-up care  Follow up with your healthcare provider or our staff if you are not improving within the next week.  When to seek medical advice  Call  your healthcare provider if any of these occur:  · Facial pain or headache becoming more severe  · Stiff neck  · Unusual drowsiness or confusion  · Swelling of the forehead or eyelids  · Vision problems, including blurred or double vision  · Fever of 100.4ºF (38ºC) or higher, or as directed by your healthcare provider  · Seizure  · Breathing problems  · Symptoms not resolving within 10 days  Date Last Reviewed: 4/13/2015  © 8268-8024 [employer]. [address] [place] [address], [place] [address]. All rights reserved. This information is not intended as a substitute for professional medical care. Always follow your healthcare professional's instructions.

## 2019-04-01 NOTE — PATIENT INSTRUCTIONS
You can try breathe right strips at night to help you breathe.  A cool mist humidifier in bedroom may help with cough and relieve stuffy nose.     Sore throat:  Lozenge, hard candy or honey.      Sinus rinses DO NOT USE TAP WATER, if you must, water must be a rolling boil for 1 minute, let it cool, then use.  May use distilled water, or over the counter nasal saline rinses.  Vics vapor rub in shower to help open nasal passages.  May use nasal gel to keep passages moisturized.  May use Nasal saline sprays during the day for added relief of congestion.   For those who go to the gym, please do not use the sauna or steam room now to clear sinuses.    During pollen season, change shirt if you are outside for a while when you go in.  Also wash your face.  Do not touch your face with your hands.  Wash your hands often in general while ill, avoid face contact with hands.     Over the counter you can use Tylenol (acetominophen) or Ibuprofen for your minor aches and pains as long as you have no contraindications.    Good nutrition. Lots of rest. Plenty of fluids      Sinusitis (Antibiotic Treatment)    The sinuses are air-filled spaces within the bones of the face. They connect to the inside of the nose. Sinusitis is an inflammation of the tissue lining the sinus cavity. Sinus inflammation can occur during a cold. It can also be due to allergies to pollens and other particles in the air. Sinusitis can cause symptoms of sinus congestion and fullness. A sinus infection causes fever, headache and facial pain. There is often green or yellow drainage from the nose or into the back of the throat (post-nasal drip). You have been given antibiotics to treat this condition.  Home care:  · Take the full course of antibiotics as instructed. Do not stop taking them, even if you feel better.  · Drink plenty of water, hot tea, and other liquids. This may help thin mucus. It also may promote sinus drainage.  · Heat may help soothe painful  areas of the face. Use a towel soaked in hot water. Or,  the shower and direct the hot spray onto your face. Using a vaporizer along with a menthol rub at night may also help.   · An expectorant containing guaifenesin may help thin the mucus and promote drainage from the sinuses.  · Over-the-counter decongestants may be used unless a similar medicine was prescribed. Nasal sprays work the fastest. Use one that contains phenylephrine or oxymetazoline. First blow the nose gently. Then use the spray. Do not use these medicines more often than directed on the label or symptoms may get worse. You may also use tablets containing pseudoephedrine. Avoid products that combine ingredients, because side effects may be increased. Read labels. You can also ask the pharmacist for help. (NOTE: Persons with high blood pressure should not use decongestants. They can raise blood pressure.)  · Over-the-counter antihistamines may help if allergies contributed to your sinusitis.    · Do not use nasal rinses or irrigation during an acute sinus infection, unless told to by your health care provider. Rinsing may spread the infection to other sinuses.  · Use acetaminophen or ibuprofen to control pain, unless another pain medicine was prescribed. (If you have chronic liver or kidney disease or ever had a stomach ulcer, talk with your doctor before using these medicines. Aspirin should never be used in anyone under 18 years of age who is ill with a fever. It may cause severe liver damage.)  · Don't smoke. This can worsen symptoms.  Follow-up care  Follow up with your healthcare provider or our staff if you are not improving within the next week.  When to seek medical advice  Call your healthcare provider if any of these occur:  · Facial pain or headache becoming more severe  · Stiff neck  · Unusual drowsiness or confusion  · Swelling of the forehead or eyelids  · Vision problems, including blurred or double vision  · Fever of 100.4ºF  (38ºC) or higher, or as directed by your healthcare provider  · Seizure  · Breathing problems  · Symptoms not resolving within 10 days  Date Last Reviewed: 4/13/2015  © 7851-9851 [employer]. [address] [place] [address], [place] [address]. All rights reserved. This information is not intended as a substitute for professional medical care. Always follow your healthcare professional's instructions.

## 2019-09-23 ENCOUNTER — PATIENT MESSAGE (OUTPATIENT)
Dept: INTERNAL MEDICINE | Facility: CLINIC | Age: 44
End: 2019-09-23

## 2019-09-23 DIAGNOSIS — R13.10 DYSPHAGIA, UNSPECIFIED TYPE: Primary | ICD-10-CM

## 2019-09-23 NOTE — TELEPHONE ENCOUNTER
Pt wife (works for [employer]) came to office today and told me that pt also has 2 swellings under pt's chin and would like Dr. Lopez to know this    Pt wife also said will have him go to  this afternoon.  Pt has been also scheduled with Gastro on 10/7

## 2019-09-23 NOTE — TELEPHONE ENCOUNTER
Please see referral orders and please call patient to schedule a consult with Gastroenterology. Thank you.

## 2019-09-23 NOTE — TELEPHONE ENCOUNTER
Please call patient and schedule patient for an appointment with me or NAHUN Acevedo, soon.    Based on his message about swallowing, we referred to GI. I think we need to see him also.    He also said he was going out of town and wanted to go to urgent care. But we should still see him soon, too.    Thank you.

## 2019-09-24 ENCOUNTER — OFFICE VISIT (OUTPATIENT)
Dept: INTERNAL MEDICINE | Facility: CLINIC | Age: 44
End: 2019-09-24
Payer: COMMERCIAL

## 2019-09-24 VITALS
DIASTOLIC BLOOD PRESSURE: 82 MMHG | WEIGHT: 191.56 LBS | BODY MASS INDEX: 28.37 KG/M2 | OXYGEN SATURATION: 95 % | HEIGHT: 69 IN | HEART RATE: 55 BPM | SYSTOLIC BLOOD PRESSURE: 122 MMHG

## 2019-09-24 DIAGNOSIS — F17.200 SMOKER: ICD-10-CM

## 2019-09-24 DIAGNOSIS — R13.10 DYSPHAGIA, UNSPECIFIED TYPE: Primary | ICD-10-CM

## 2019-09-24 PROCEDURE — 99213 PR OFFICE/OUTPT VISIT, EST, LEVL III, 20-29 MIN: ICD-10-PCS | Mod: S$GLB,,, | Performed by: NURSE PRACTITIONER

## 2019-09-24 PROCEDURE — 3008F PR BODY MASS INDEX (BMI) DOCUMENTED: ICD-10-PCS | Mod: CPTII,S$GLB,, | Performed by: NURSE PRACTITIONER

## 2019-09-24 PROCEDURE — 99999 PR PBB SHADOW E&M-EST. PATIENT-LVL III: CPT | Mod: PBBFAC,,, | Performed by: NURSE PRACTITIONER

## 2019-09-24 PROCEDURE — 99999 PR PBB SHADOW E&M-EST. PATIENT-LVL III: ICD-10-PCS | Mod: PBBFAC,,, | Performed by: NURSE PRACTITIONER

## 2019-09-24 PROCEDURE — 3008F BODY MASS INDEX DOCD: CPT | Mod: CPTII,S$GLB,, | Performed by: NURSE PRACTITIONER

## 2019-09-24 PROCEDURE — 99213 OFFICE O/P EST LOW 20 MIN: CPT | Mod: S$GLB,,, | Performed by: NURSE PRACTITIONER

## 2019-09-24 RX ORDER — PANTOPRAZOLE SODIUM 20 MG/1
20 TABLET, DELAYED RELEASE ORAL DAILY
Qty: 30 TABLET | Refills: 11 | Status: SHIPPED | OUTPATIENT
Start: 2019-09-24 | End: 2019-09-24

## 2019-09-24 RX ORDER — PANTOPRAZOLE SODIUM 20 MG/1
20 TABLET, DELAYED RELEASE ORAL DAILY
Qty: 30 TABLET | Refills: 0 | Status: SHIPPED | OUTPATIENT
Start: 2019-09-24 | End: 2019-11-14 | Stop reason: SDUPTHER

## 2019-09-24 NOTE — PROGRESS NOTES
Subjective:      Patient ID: James Royden Peabody IV is a 44 y.o. male.    Chief Complaint: Choking (trouble swallowing for a little over year, and knots in throat, on Sunday he was eating a steak and felt as if it got stuck in the middle of his throat )    Mr. Peabody is a 44 y.o. male patient of Moses Betts MD who comes in today for trouble swallowing.  This has happened occasionally over the last year.  Certain foods get stuck when he eats and he needs to drink water to get the food down.  He feels like he has some swelling to the throat and area under his chin.  This past Saturday, he almost choked on a piece of meat, which really scared him.  He smokes about a pack per day currently. Pertinent egatives include no fever, no chills, no cough, no sore throat, no chest pain, no shortness of breath.  Patient denies heartburn or other reflux symptoms.  He does report that he occasionally has seasonal allergies and postnasal drip.  Him and his wife have a trip planned to [employer] on Friday.      Review of Systems   Constitutional: Negative for chills and fever.   HENT: Positive for postnasal drip. Negative for sinus pain and sore throat.         Swelling under his chin   Respiratory: Negative for cough and shortness of breath.    Cardiovascular: Negative for chest pain and leg swelling.   Gastrointestinal: Negative for abdominal distention.        Trouble swallowing, food gets stuck.  See hpi   Genitourinary: Negative for difficulty urinating.   Allergic/Immunologic: Positive for environmental allergies. Negative for food allergies.   Neurological: Negative for dizziness, light-headedness and headaches.       Review of patient's allergies indicates:  No Known Allergies      Current Outpatient Medications:     naproxen (NAPROSYN) 500 MG tablet, Take 1 tablet (500 mg total) by mouth 2 (two) times daily., Disp: 60 tablet, Rfl: 2    Patient Active Problem List    Diagnosis Date Noted    Smoker 09/21/2018    Right  "shoulder pain 09/19/2018    Right-sided thoracic back pain 09/19/2018    Hyperlipidemia 03/04/2013       Past Medical History:   Diagnosis Date    Hyperlipidemia        Past Surgical History:   Procedure Laterality Date    HERNIA REPAIR         Objective:     Vitals:    09/24/19 1413   BP: 122/82   Pulse: (!) 55   SpO2: 95%   Weight: 86.9 kg (191 lb 9.3 oz)   Height: 5' 9" (1.753 m)       Body mass index is 28.29 kg/m².    Physical Exam   Constitutional: He is oriented to person, place, and time. He appears well-developed and well-nourished. No distress.   HENT:   Head: Normocephalic and atraumatic.   Eyes: Conjunctivae and EOM are normal. No scleral icterus.   Neck: Normal range of motion. Neck supple. No thyromegaly present.   Cardiovascular: Normal rate, regular rhythm, normal heart sounds and intact distal pulses.   Pulmonary/Chest: Effort normal and breath sounds normal. No respiratory distress.   Abdominal: Soft. Bowel sounds are normal. He exhibits no distension. A hernia is present.   Small abdominal hernia in luq, reducible   Musculoskeletal: Normal range of motion. He exhibits no edema.   Lymphadenopathy:     He has no cervical adenopathy.   Neurological: He is alert and oriented to person, place, and time.   Skin: Skin is warm and dry. He is not diaphoretic.   Psychiatric: He has a normal mood and affect. His behavior is normal.     Assessment:     1. Dysphagia, unspecified type    2. Smoker    3. BMI 28.0-28.9,adult      Plan:     Marito was seen today for choking.    Diagnoses and all orders for this visit:    Dysphagia, unspecified type  Dr. Betts in to visit to assess patient and plan discussed.  Submental nodes normal.  No cervical or supraclavicular nodes enlargement.  Patient has a GI appointment already scheduled for 10/07/19.  Will trial protonix x 1 month.  See patient instructions    -     CBC auto differential; Future  -     Comprehensive metabolic panel; Future  -     TSH; Future  -     " pantoprazole (PROTONIX) 20 MG tablet; Take 1 tablet (20 mg total) by mouth once daily.    Smoker  Smoking cessation encouraged  Followed by pcp    BMI 28.0-28.9,adult  BMI reviewed      Patient Instructions   Start protonix once daily for about one month to see if it helps with trouble swallowing    Keep follow up with GI doctor    Labs today.    Call or return to clinic if symptoms worsen or do not improve with discussed treatment plan.        Follow up with your primary care provider. May go to ER for new shortness of breath, chest pain, lightheadedness, fever, severe/new headaches or any other emergent complaints or changes in condition.

## 2019-09-24 NOTE — PATIENT INSTRUCTIONS
Start protonix once daily for about one month to see if it helps with trouble swallowing    Keep follow up with GI doctor    Labs today.    Call or return to clinic if symptoms worsen or do not improve with discussed treatment plan.        Follow up with your primary care provider. May go to ER for new shortness of breath, chest pain, lightheadedness, fever, severe/new headaches or any other emergent complaints or changes in condition.

## 2019-09-25 NOTE — PROGRESS NOTES
I have seen the patient in conjunction with the Advanced Practice Nurse Practitioner (APRN) or Physician Assistant (PA), reviewed history and physical, assessment and plan. I have personally interviewed and reexamined the patient at bedside and agree with the findings, assessment and plan. I did not appreciate pathologic cervical masses. Recommend a referral to Gastroenterology for EGD.

## 2019-10-03 ENCOUNTER — PATIENT OUTREACH (OUTPATIENT)
Dept: ADMINISTRATIVE | Facility: OTHER | Age: 44
End: 2019-10-03

## 2019-10-07 ENCOUNTER — TELEPHONE (OUTPATIENT)
Dept: ENDOSCOPY | Facility: HOSPITAL | Age: 44
End: 2019-10-07

## 2019-10-07 ENCOUNTER — OFFICE VISIT (OUTPATIENT)
Dept: GASTROENTEROLOGY | Facility: CLINIC | Age: 44
End: 2019-10-07
Attending: FAMILY MEDICINE
Payer: COMMERCIAL

## 2019-10-07 VITALS
HEIGHT: 69 IN | HEART RATE: 59 BPM | BODY MASS INDEX: 28.28 KG/M2 | WEIGHT: 190.94 LBS | DIASTOLIC BLOOD PRESSURE: 75 MMHG | SYSTOLIC BLOOD PRESSURE: 115 MMHG

## 2019-10-07 DIAGNOSIS — R09.82 POST-NASAL DRIP: ICD-10-CM

## 2019-10-07 DIAGNOSIS — R59.9 ADENOPATHY: ICD-10-CM

## 2019-10-07 DIAGNOSIS — R13.19 ESOPHAGEAL DYSPHAGIA: Primary | ICD-10-CM

## 2019-10-07 PROCEDURE — 99999 PR PBB SHADOW E&M-EST. PATIENT-LVL IV: ICD-10-PCS | Mod: PBBFAC,,, | Performed by: NURSE PRACTITIONER

## 2019-10-07 PROCEDURE — 3008F PR BODY MASS INDEX (BMI) DOCUMENTED: ICD-10-PCS | Mod: CPTII,S$GLB,, | Performed by: NURSE PRACTITIONER

## 2019-10-07 PROCEDURE — 3008F BODY MASS INDEX DOCD: CPT | Mod: CPTII,S$GLB,, | Performed by: NURSE PRACTITIONER

## 2019-10-07 PROCEDURE — 99204 OFFICE O/P NEW MOD 45 MIN: CPT | Mod: S$GLB,,, | Performed by: NURSE PRACTITIONER

## 2019-10-07 PROCEDURE — 99999 PR PBB SHADOW E&M-EST. PATIENT-LVL IV: CPT | Mod: PBBFAC,,, | Performed by: NURSE PRACTITIONER

## 2019-10-07 PROCEDURE — 99204 PR OFFICE/OUTPT VISIT, NEW, LEVL IV, 45-59 MIN: ICD-10-PCS | Mod: S$GLB,,, | Performed by: NURSE PRACTITIONER

## 2019-10-07 NOTE — H&P (VIEW-ONLY)
[facility] Consultation Note    Reason for Consult:  The primary encounter diagnosis was Esophageal dysphagia. Diagnoses of Adenopathy and Post-nasal drip were also pertinent to this visit.    PCP:   [facility] Bet[facility]   [address] [facility] [id number]    Referring MD:  Moses Betts Md  [address]  [place] [address]    HPI:  This is a 44 y.o. male here for evaluation of dysphagia. He is a new patient.    Dysphagia started a year ago. Off and on. Gets bad. Happens majority of the weak.  Feels like food gets stuck such as steak bread, meats.  Liquids no issue.  Denies heart burn, N/V, abd pain regurgitation.  Swelling under chin.  Taking protonix 20 mg every morning.  No overt GI bleeding  + Coughing    ROS:  Constitutional: No fevers, chills, No weight loss  ENT: No allergies, +PND  CV: No chest pain  Pulm: No cough, No shortness of breath  Ophtho: No vision changes  GI: see HPI  Derm: No rash  Heme: + lymphadenopathy, No bruising  MSK: No arthritis  : No dysuria, No hematuria  Neuro: No syncope, No seizure  Psych: + anxiety, No depression    Medical History:  has a past medical history of Hyperlipidemia.    Surgical History:  has a past surgical history that includes Hernia repair.    Family History: family history includes No Known Problems in his father and mother..     Social History:  reports that he has been smoking cigarettes. He has never used smokeless tobacco. He reports that he drinks alcohol. He reports that he does not use drugs.    Review of patient's allergies indicates:  No Known Allergies    Current Outpatient Medications on File Prior to Visit   Medication Sig Dispense Refill    naproxen (NAPROSYN) 500 MG tablet Take 1 tablet (500 mg total) by mouth 2 (two) times daily. (Patient taking differently: Take 500 mg by mouth 2 (two) times daily. ) 60 tablet 2    pantoprazole (PROTONIX) 20 MG tablet Take 1 tablet (20 mg total) by mouth once daily. 30  "tablet 0     No current facility-administered medications on file prior to visit.          Objective Findings:    Vital Signs:  /75 (BP Location: Right arm)   Pulse (!) 59   Ht 5' 9" (1.753 m)   Wt 86.6 kg (190 lb 14.7 oz)   BMI 28.19 kg/m²   Body mass index is 28.19 kg/m².    Physical Exam:  General Appearance: Well appearing in no acute distress  Head:   Normocephalic, without obvious abnormality  Eyes:    No scleral icterus  ENT: Neck supple  Lungs: CTA bilaterally in anterior and posterior fields, no wheezes, no crackles.  Heart:  Regular rate and rhythm, S1, S2 normal, no murmurs heard  Extremities: No edema  Skin: No rash  Neurologic: AAO x 3      Labs:  Lab Results   Component Value Date    WBC 6.44 09/24/2019    HGB 14.6 09/24/2019    HCT 44.1 09/24/2019     09/24/2019    CHOL 232 (H) 09/25/2018    TRIG 133 09/25/2018    HDL 51 09/25/2018    ALT 23 09/24/2019    AST 26 09/24/2019     09/24/2019    K 4.7 09/24/2019     09/24/2019    CREATININE 0.9 09/24/2019    BUN 16 09/24/2019    CO2 27 09/24/2019    TSH 1.503 09/24/2019       Imaging:  None  Endoscopy:    None    Assessment:    Mr. Peabody is a 44 y.o. WM with:    1. Esophageal dysphagia    2. Adenopathy    3. Post-nasal drip       Onset x1 year. Solids only.     Recommendations:  1. PPI QAM  2. Esophagram  3. EGD    Pt to schedule f/u appt with me 2 weeks from EGD date    Order summary:  Orders Placed This Encounter    FL Esophagram With Barium Tablet    Ambulatory Referral to ENT    Case request GI: EGD (ESOPHAGOGASTRODUODENOSCOPY)         Thank you so much for allowing me to participate in the care of James Royden Peabody MICHAEL Hurtado        "

## 2019-10-07 NOTE — PROGRESS NOTES
[facility] Consultation Note    Reason for Consult:  The primary encounter diagnosis was Esophageal dysphagia. Diagnoses of Adenopathy and Post-nasal drip were also pertinent to this visit.    PCP:   [facility] Bet[facility]   [address] [facility] [id number]    Referring MD:  Moses Betts Md  [address]  [place] [address]    HPI:  This is a 44 y.o. male here for evaluation of dysphagia. He is a new patient.    Dysphagia started a year ago. Off and on. Gets bad. Happens majority of the weak.  Feels like food gets stuck such as steak bread, meats.  Liquids no issue.  Denies heart burn, N/V, abd pain regurgitation.  Swelling under chin.  Taking protonix 20 mg every morning.  No overt GI bleeding  + Coughing    ROS:  Constitutional: No fevers, chills, No weight loss  ENT: No allergies, +PND  CV: No chest pain  Pulm: No cough, No shortness of breath  Ophtho: No vision changes  GI: see HPI  Derm: No rash  Heme: + lymphadenopathy, No bruising  MSK: No arthritis  : No dysuria, No hematuria  Neuro: No syncope, No seizure  Psych: + anxiety, No depression    Medical History:  has a past medical history of Hyperlipidemia.    Surgical History:  has a past surgical history that includes Hernia repair.    Family History: family history includes No Known Problems in his father and mother..     Social History:  reports that he has been smoking cigarettes. He has never used smokeless tobacco. He reports that he drinks alcohol. He reports that he does not use drugs.    Review of patient's allergies indicates:  No Known Allergies    Current Outpatient Medications on File Prior to Visit   Medication Sig Dispense Refill    naproxen (NAPROSYN) 500 MG tablet Take 1 tablet (500 mg total) by mouth 2 (two) times daily. (Patient taking differently: Take 500 mg by mouth 2 (two) times daily. ) 60 tablet 2    pantoprazole (PROTONIX) 20 MG tablet Take 1 tablet (20 mg total) by mouth once daily. 30  "tablet 0     No current facility-administered medications on file prior to visit.          Objective Findings:    Vital Signs:  /75 (BP Location: Right arm)   Pulse (!) 59   Ht 5' 9" (1.753 m)   Wt 86.6 kg (190 lb 14.7 oz)   BMI 28.19 kg/m²   Body mass index is 28.19 kg/m².    Physical Exam:  General Appearance: Well appearing in no acute distress  Head:   Normocephalic, without obvious abnormality  Eyes:    No scleral icterus  ENT: Neck supple  Lungs: CTA bilaterally in anterior and posterior fields, no wheezes, no crackles.  Heart:  Regular rate and rhythm, S1, S2 normal, no murmurs heard  Extremities: No edema  Skin: No rash  Neurologic: AAO x 3      Labs:  Lab Results   Component Value Date    WBC 6.44 09/24/2019    HGB 14.6 09/24/2019    HCT 44.1 09/24/2019     09/24/2019    CHOL 232 (H) 09/25/2018    TRIG 133 09/25/2018    HDL 51 09/25/2018    ALT 23 09/24/2019    AST 26 09/24/2019     09/24/2019    K 4.7 09/24/2019     09/24/2019    CREATININE 0.9 09/24/2019    BUN 16 09/24/2019    CO2 27 09/24/2019    TSH 1.503 09/24/2019       Imaging:  None  Endoscopy:    None    Assessment:    Mr. Peabody is a 44 y.o. WM with:    1. Esophageal dysphagia    2. Adenopathy    3. Post-nasal drip       Onset x1 year. Solids only.     Recommendations:  1. PPI QAM  2. Esophagram  3. EGD    Pt to schedule f/u appt with me 2 weeks from EGD date    Order summary:  Orders Placed This Encounter    FL Esophagram With Barium Tablet    Ambulatory Referral to ENT    Case request GI: EGD (ESOPHAGOGASTRODUODENOSCOPY)         Thank you so much for allowing me to participate in the care of James Royden Peabody MICHAEL Hurtado        "

## 2019-10-07 NOTE — LETTER
October 7, 2019      Moses Lopez MD  [address]  [facility] [address]           [facility]  [address]  [facility] [address]  Phone: [phone]  Fax: [phone]          Patient: James Royden Peabody IV   MR Number: [id number]   YOB: 1975   Date of Visit: 10/7/2019       Dear Dr. Moses Lopez:    Thank you for referring James Peabody to me for evaluation. Attached you will find relevant portions of my assessment and plan of care.    If you have questions, please do not hesitate to call me. I look forward to following James Peabody along with you.    Sincerely,    Gisel Rachel, KENISHA    Enclosure  CC:  No Recipients    If you would like to receive this communication electronically, please contact [email or web address] or [phone] to request more information on [facility] Link access.    For providers and/or their staff who would like to refer a patient to [facility], please contact us through our one-stop-shop provider referral line, [facility], at [phone].    If you feel you have received this communication in error or would no longer like to receive these types of communications, please e-mail [email or web address]

## 2019-10-08 ENCOUNTER — HOSPITAL ENCOUNTER (OUTPATIENT)
Facility: HOSPITAL | Age: 44
Discharge: HOME | End: 2019-10-08
Attending: INTERNAL MEDICINE | Admitting: INTERNAL MEDICINE
Payer: COMMERCIAL

## 2019-10-08 ENCOUNTER — ANESTHESIA (OUTPATIENT)
Dept: ENDOSCOPY | Facility: HOSPITAL | Age: 44
End: 2019-10-08
Payer: COMMERCIAL

## 2019-10-08 ENCOUNTER — ANESTHESIA EVENT (OUTPATIENT)
Dept: ENDOSCOPY | Facility: HOSPITAL | Age: 44
End: 2019-10-08
Payer: COMMERCIAL

## 2019-10-08 VITALS
OXYGEN SATURATION: 97 % | BODY MASS INDEX: 28.14 KG/M2 | HEIGHT: 69 IN | DIASTOLIC BLOOD PRESSURE: 75 MMHG | TEMPERATURE: 98 F | HEART RATE: 50 BPM | SYSTOLIC BLOOD PRESSURE: 118 MMHG | RESPIRATION RATE: 16 BRPM | WEIGHT: 190 LBS

## 2019-10-08 DIAGNOSIS — R13.10 DYSPHAGIA: ICD-10-CM

## 2019-10-08 DIAGNOSIS — R13.19 ESOPHAGEAL DYSPHAGIA: Primary | ICD-10-CM

## 2019-10-08 PROCEDURE — 43249 PR EGD, FLEX, W/BALL DILATION, < 30MM: ICD-10-PCS | Mod: ,,, | Performed by: INTERNAL MEDICINE

## 2019-10-08 PROCEDURE — 88305 TISSUE SPECIMEN TO PATHOLOGY - SURGERY: ICD-10-PCS | Mod: 26,,, | Performed by: PATHOLOGY

## 2019-10-08 PROCEDURE — 88305 TISSUE EXAM BY PATHOLOGIST: CPT | Mod: 26,,, | Performed by: PATHOLOGY

## 2019-10-08 PROCEDURE — 37000008 HC ANESTHESIA 1ST 15 MINUTES: Performed by: INTERNAL MEDICINE

## 2019-10-08 PROCEDURE — 43249 ESOPH EGD DILATION <30 MM: CPT | Performed by: INTERNAL MEDICINE

## 2019-10-08 PROCEDURE — 88305 TISSUE EXAM BY PATHOLOGIST: CPT | Performed by: PATHOLOGY

## 2019-10-08 PROCEDURE — 25000003 PHARM REV CODE 250: Performed by: NURSE ANESTHETIST, CERTIFIED REGISTERED

## 2019-10-08 PROCEDURE — 63600175 PHARM REV CODE 636 W HCPCS: Performed by: INTERNAL MEDICINE

## 2019-10-08 PROCEDURE — E9220 PRA ENDO ANESTHESIA: ICD-10-PCS | Mod: ,,, | Performed by: NURSE ANESTHETIST, CERTIFIED REGISTERED

## 2019-10-08 PROCEDURE — C1726 CATH, BAL DIL, NON-VASCULAR: HCPCS | Performed by: INTERNAL MEDICINE

## 2019-10-08 PROCEDURE — 27201012 HC FORCEPS, HOT/COLD, DISP: Performed by: INTERNAL MEDICINE

## 2019-10-08 PROCEDURE — 43239 EGD BIOPSY SINGLE/MULTIPLE: CPT | Performed by: INTERNAL MEDICINE

## 2019-10-08 PROCEDURE — 63600175 PHARM REV CODE 636 W HCPCS: Performed by: NURSE ANESTHETIST, CERTIFIED REGISTERED

## 2019-10-08 PROCEDURE — E9220 PRA ENDO ANESTHESIA: HCPCS | Mod: ,,, | Performed by: NURSE ANESTHETIST, CERTIFIED REGISTERED

## 2019-10-08 PROCEDURE — 43249 ESOPH EGD DILATION <30 MM: CPT | Mod: ,,, | Performed by: INTERNAL MEDICINE

## 2019-10-08 PROCEDURE — 37000009 HC ANESTHESIA EA ADD 15 MINS: Performed by: INTERNAL MEDICINE

## 2019-10-08 RX ORDER — PROPOFOL 10 MG/ML
VIAL (ML) INTRAVENOUS
Status: DISCONTINUED | OUTPATIENT
Start: 2019-10-08 | End: 2019-10-08

## 2019-10-08 RX ORDER — SODIUM CHLORIDE 9 MG/ML
INJECTION, SOLUTION INTRAVENOUS CONTINUOUS
Status: DISCONTINUED | OUTPATIENT
Start: 2019-10-08 | End: 2019-10-08 | Stop reason: HOSPADM

## 2019-10-08 RX ORDER — GLYCOPYRROLATE 0.2 MG/ML
INJECTION INTRAMUSCULAR; INTRAVENOUS
Status: DISCONTINUED | OUTPATIENT
Start: 2019-10-08 | End: 2019-10-08

## 2019-10-08 RX ORDER — LIDOCAINE HCL/PF 100 MG/5ML
SYRINGE (ML) INTRAVENOUS
Status: DISCONTINUED | OUTPATIENT
Start: 2019-10-08 | End: 2019-10-08

## 2019-10-08 RX ADMIN — PROPOFOL 20 MG: 10 INJECTION, EMULSION INTRAVENOUS at 07:10

## 2019-10-08 RX ADMIN — PROPOFOL 30 MG: 10 INJECTION, EMULSION INTRAVENOUS at 08:10

## 2019-10-08 RX ADMIN — PROPOFOL 100 MG: 10 INJECTION, EMULSION INTRAVENOUS at 07:10

## 2019-10-08 RX ADMIN — LIDOCAINE HYDROCHLORIDE 40 MG: 20 INJECTION, SOLUTION INTRAVENOUS at 07:10

## 2019-10-08 RX ADMIN — SODIUM CHLORIDE: 0.9 INJECTION, SOLUTION INTRAVENOUS at 07:10

## 2019-10-08 RX ADMIN — GLYCOPYRROLATE 0.2 MG: 0.2 INJECTION, SOLUTION INTRAMUSCULAR; INTRAVENOUS at 07:10

## 2019-10-08 RX ADMIN — PROPOFOL 50 MG: 10 INJECTION, EMULSION INTRAVENOUS at 07:10

## 2019-10-08 NOTE — ANESTHESIA RELEASE NOTE
"Anesthesia Release from PACU Note    Patient: James Royden Peabody IV    Procedure(s) Performed: Procedure(s) (LRB):  EGD (ESOPHAGOGASTRODUODENOSCOPY) (N/A)    Anesthesia type: general    Post pain: Adequate analgesia    Post assessment: no apparent anesthetic complications and tolerated procedure well    Last Vitals:   Visit Vitals  /75 (BP Location: Left arm, Patient Position: Sitting)   Pulse (!) 50   Temp 36.7 °C (98.1 °F) (Temporal)   Resp 16   Ht 5' 9" (1.753 m)   Wt 86.2 kg (190 lb)   SpO2 97%   BMI 28.06 kg/m²       Post vital signs: stable    Level of consciousness: awake and alert     Nausea/Vomiting: no nausea/no vomiting    Complications: none    Airway Patency: patent    Respiratory: unassisted, spontaneous ventilation, room air    Cardiovascular: stable and blood pressure at baseline    Hydration: euvolemic  "

## 2019-10-08 NOTE — TRANSFER OF CARE
"Anesthesia Transfer of Care Note    Patient: James Royden Peabody IV    Procedure(s) Performed: Procedure(s) (LRB):  EGD (ESOPHAGOGASTRODUODENOSCOPY) (N/A)    Patient location: GI    Anesthesia Type: general    Transport from OR: Transported from OR on room air with adequate spontaneous ventilation    Post pain: adequate analgesia    Post assessment: no apparent anesthetic complications and tolerated procedure well    Post vital signs: stable    Level of consciousness: awake and alert    Nausea/Vomiting: no nausea/vomiting    Complications: none    Transfer of care protocol was followed      Last vitals:   Visit Vitals  /83   Pulse (!) 59   Temp 36.6 °C (97.9 °F)   Resp 16   Ht 5' 9" (1.753 m)   Wt 86.2 kg (190 lb)   SpO2 96%   BMI 28.06 kg/m²     "

## 2019-10-08 NOTE — ANESTHESIA POSTPROCEDURE EVALUATION
Anesthesia Post Evaluation    Patient: James Royden Peabody IV    Procedure(s) Performed: Procedure(s) (LRB):  EGD (ESOPHAGOGASTRODUODENOSCOPY) (N/A)    Final Anesthesia Type: general  Patient location during evaluation: GI PACU  Patient participation: Yes- Able to Participate  Level of consciousness: awake and alert  Post-procedure vital signs: reviewed and stable  Pain management: adequate  Airway patency: patent  PONV status at discharge: No PONV  Anesthetic complications: no      Cardiovascular status: hemodynamically stable  Respiratory status: unassisted, spontaneous ventilation and room air  Hydration status: euvolemic  Follow-up not needed.          Vitals Value Taken Time   /75 10/8/2019  8:46 AM   Temp 36.7 °C (98.1 °F) 10/8/2019  8:16 AM   Pulse 50 10/8/2019  8:46 AM   Resp 16 10/8/2019  8:46 AM   SpO2 97 % 10/8/2019  8:46 AM         Event Time     Out of Recovery 09:04:32          Pain/Naz Score: Naz Score: 9 (10/8/2019  8:16 AM)

## 2019-10-08 NOTE — ANESTHESIA PREPROCEDURE EVALUATION
10/08/2019  James Royden Peabody IV is a 44 y.o., male.  Past Medical History:   Diagnosis Date    Hyperlipidemia      Past Surgical History:   Procedure Laterality Date    HERNIA REPAIR           Anesthesia Evaluation    I have reviewed the Patient Summary Reports.     I have reviewed the Medications.     Review of Systems  Anesthesia Hx:   Denies Personal Hx of Anesthesia complications.   Social:  Smoker    EENT/Dental:   chronic allergic rhinitis   Cardiovascular:  Cardiovascular Normal     Pulmonary:  Pulmonary Normal    Hepatic/GI:   GERD    Neurological:  Neurology Normal        Physical Exam  General:  Well nourished, Large Beard    Airway/Jaw/Neck:  Airway Findings: Mouth Opening: Normal Tongue: Normal  General Airway Assessment: Adult  Mallampati: II  TM Distance: Normal, at least 6 cm  Jaw/Neck Findings:  Neck ROM: Normal ROM  Neck Findings:     Eyes/Ears/Nose:  EYES/EARS/NOSE FINDINGS: Normal   Dental:  Dental Findings: In tact   Chest/Lungs:  Chest/Lungs Findings: Clear to auscultation, Normal Respiratory Rate     Heart/Vascular:  Heart Findings: Rate: Normal  Rhythm: Regular Rhythm  Sounds: Normal        Mental Status:  Mental Status Findings:  Cooperative, Alert and Oriented         Anesthesia Plan  Type of Anesthesia, risks & benefits discussed:  Anesthesia Type:  general  Patient's Preference: GA  Intra-op Monitoring Plan: standard ASA monitors  Intra-op Monitoring Plan Comments:   Post Op Pain Control Plan: IV/PO Opioids PRN  Post Op Pain Control Plan Comments:   Induction:   IV  Beta Blocker:  Patient is not currently on a Beta-Blocker (No further documentation required).       Informed Consent: Patient understands risks and agrees with Anesthesia plan.  Questions answered. Anesthesia consent signed with patient.  ASA Score: 2     Day of Surgery Review of History & Physical: I have  interviewed and examined the patient. I have reviewed the patient's H&P dated:  There are no significant changes.  H&P update referred to the provider.         Ready For Surgery From Anesthesia Perspective.

## 2019-10-08 NOTE — [employer] PATIENT INSTRUCTIONS
Discharge Summary/Instructions after an Endoscopic Procedure  Patient Name: James Peabody  Patient MRN: [id number]  Patient YOB: 1975 Tuesday, October 08, 2019  Marito Pringle MD  RESTRICTIONS:  During your procedure today, you received medications for sedation.  These   medications may affect your judgment, balance and coordination.  Therefore,   for 24 hours, you have the following restrictions:   - DO NOT drive a car, operate machinery, make legal/financial decisions,   sign important papers or drink alcohol.    ACTIVITY:  Today: no heavy lifting, straining or running due to procedural   sedation/anesthesia.  The following day: return to full activity including work.  DIET:  Eat and drink normally unless instructed otherwise.     TREATMENT FOR COMMON SIDE EFFECTS:  - Mild abdominal pain, nausea, belching, bloating or excessive gas:  rest,   eat lightly and use a heating pad.  - Sore Throat: treat with throat lozenges and/or gargle with warm salt   water.  - Because air was used during the procedure, expelling large amounts of air   from your rectum or belching is normal.  - If a bowel prep was taken, you may not have a bowel movement for 1-3 days.    This is normal.  SYMPTOMS TO WATCH FOR AND REPORT TO YOUR PHYSICIAN:  1. Abdominal pain or bloating, other than gas cramps.  2. Chest pain.  3. Back pain.  4. Signs of infection such as: chills or fever occurring within 24 hours   after the procedure.  5. Rectal bleeding, which would show as bright red, maroon, or black stools.   (A tablespoon of blood from the rectum is not serious, especially if   hemorrhoids are present.)  6. Vomiting.  7. Weakness or dizziness.  GO DIRECTLY TO THE NEAREST EMERGENCY ROOM IF YOU HAVE ANY OF THE FOLLOWING:      Difficulty breathing              Chills and/or fever over 101 F   Persistent vomiting and/or vomiting blood   Severe abdominal pain   Severe chest pain   Black, tarry stools   Bleeding- more than one  tablespoon   Any other symptom or condition that you feel may need urgent attention  Your doctor recommends these additional instructions:  If any biopsies were taken, your doctors clinic will contact you in 1 to 2   weeks with any results.  - Patient has a contact number available for emergencies.  The signs and   symptoms of potential delayed complications were discussed with the   patient.  Return to normal activities tomorrow.  Written discharge   instructions were provided to the patient.   - Discharge patient to home (ambulatory).   - Resume previous diet.   - Continue present medications.   - Use Prilosec (omeprazole) 20 mg PO daily.   - Repeat upper endoscopy PRN for retreatment.  For questions, problems or results please call your physician - Marito Pringle MD at Work:  [phone].  [facility], EMERGENCY ROOM PHONE NUMBER: [phone]  IF A COMPLICATION OR EMERGENCY SITUATION ARISES AND YOU ARE UNABLE TO REACH   YOUR PHYSICIAN - GO DIRECTLY TO THE EMERGENCY ROOM.  Marito Pringle MD  10/8/2019 8:11:56 AM  This report has been verified and signed electronically.  [employer]

## 2019-10-08 NOTE — DISCHARGE INSTRUCTIONS

## 2019-10-15 ENCOUNTER — TELEPHONE (OUTPATIENT)
Dept: ENDOSCOPY | Facility: HOSPITAL | Age: 44
End: 2019-10-15

## 2019-10-15 ENCOUNTER — TELEPHONE (OUTPATIENT)
Dept: GASTROENTEROLOGY | Facility: CLINIC | Age: 44
End: 2019-10-15

## 2019-10-15 NOTE — TELEPHONE ENCOUNTER
----- Message from Marito Pringle MD sent at 10/14/2019  2:37 PM CDT -----  Biopsy just confirmed the stricture without any precancerous tissue

## 2019-10-21 ENCOUNTER — PATIENT OUTREACH (OUTPATIENT)
Dept: ADMINISTRATIVE | Facility: OTHER | Age: 44
End: 2019-10-21

## 2019-10-24 ENCOUNTER — OFFICE VISIT (OUTPATIENT)
Dept: GASTROENTEROLOGY | Facility: CLINIC | Age: 44
End: 2019-10-24
Payer: COMMERCIAL

## 2019-10-24 VITALS
HEART RATE: 67 BPM | WEIGHT: 193.56 LBS | DIASTOLIC BLOOD PRESSURE: 84 MMHG | HEIGHT: 69 IN | SYSTOLIC BLOOD PRESSURE: 129 MMHG | BODY MASS INDEX: 28.67 KG/M2

## 2019-10-24 DIAGNOSIS — R13.19 ESOPHAGEAL DYSPHAGIA: Primary | ICD-10-CM

## 2019-10-24 DIAGNOSIS — K22.2 ESOPHAGEAL STRICTURE: ICD-10-CM

## 2019-10-24 DIAGNOSIS — K21.9 GERD WITHOUT ESOPHAGITIS: ICD-10-CM

## 2019-10-24 PROCEDURE — 99213 OFFICE O/P EST LOW 20 MIN: CPT | Mod: S$GLB,,, | Performed by: NURSE PRACTITIONER

## 2019-10-24 PROCEDURE — 99999 PR PBB SHADOW E&M-EST. PATIENT-LVL III: CPT | Mod: PBBFAC,,, | Performed by: NURSE PRACTITIONER

## 2019-10-24 PROCEDURE — 99999 PR PBB SHADOW E&M-EST. PATIENT-LVL III: ICD-10-PCS | Mod: PBBFAC,,, | Performed by: NURSE PRACTITIONER

## 2019-10-24 PROCEDURE — 99213 PR OFFICE/OUTPT VISIT, EST, LEVL III, 20-29 MIN: ICD-10-PCS | Mod: S$GLB,,, | Performed by: NURSE PRACTITIONER

## 2019-10-24 PROCEDURE — 3008F BODY MASS INDEX DOCD: CPT | Mod: CPTII,S$GLB,, | Performed by: NURSE PRACTITIONER

## 2019-10-24 PROCEDURE — 3008F PR BODY MASS INDEX (BMI) DOCUMENTED: ICD-10-PCS | Mod: CPTII,S$GLB,, | Performed by: NURSE PRACTITIONER

## 2019-10-24 RX ORDER — CETIRIZINE HYDROCHLORIDE 10 MG/1
10 TABLET ORAL DAILY PRN
COMMUNITY
End: 2023-07-10

## 2019-10-24 NOTE — PROGRESS NOTES
[facility] Consultation Note    Reason for Consult:  The primary encounter diagnosis was Esophageal dysphagia. Diagnoses of Esophageal stricture and GERD without esophagitis were also pertinent to this visit.    PCP:   Moses Betts       Referring MD:  No referring provider defined for this encounter.    HPI:    Previous note 10/7/19  This is a 44 y.o. male here for evaluation of dysphagia. He is a new patient.    Dysphagia started a year ago. Off and on. Gets bad. Happens majority of the weak.  Feels like food gets stuck such as steak bread, meats.  Liquids no issue.  Denies heart burn, N/V, abd pain regurgitation.  Swelling under chin.  Taking protonix 20 mg every morning.  No overt GI bleeding  + Coughing    Today 10/24/19  Patient had EGD on October 8 showed a benign appearing stenosis.  It was dilated.  Biopsies normal.  Now he is actually having heart burn. Happened shortly after EGD. But has slowly improved.  No n/v.   Difficulty swallowing has resolved.     ROS:  Constitutional: No fevers, chills, No weight loss  ENT: No allergies, +PND  CV: No chest pain  Pulm: No cough, No shortness of breath  Ophtho: No vision changes  GI: see HPI  Derm: No rash  Heme: + lymphadenopathy, No bruising  MSK: No arthritis  : No dysuria, No hematuria  Neuro: No syncope, No seizure  Psych: + anxiety, No depression    Medical History:  has a past medical history of Hyperlipidemia.    Surgical History:  has a past surgical history that includes Hernia repair and Esophagogastroduodenoscopy (N/A, 10/8/2019).    Family History: family history includes No Known Problems in his father and mother..     Social History:  reports that he has been smoking cigarettes. He has never used smokeless tobacco. He reports that he drinks alcohol. He reports that he does not use drugs.    Review of patient's allergies indicates:  No Known Allergies    Current Outpatient Medications on File Prior to Visit   Medication  "Sig Dispense Refill    cetirizine (ZYRTEC) 10 MG tablet Take 10 mg by mouth daily as needed for Allergies.      naproxen (NAPROSYN) 500 MG tablet Take 1 tablet (500 mg total) by mouth 2 (two) times daily. (Patient taking differently: Take 500 mg by mouth 2 (two) times daily. ) 60 tablet 2    pantoprazole (PROTONIX) 20 MG tablet Take 1 tablet (20 mg total) by mouth once daily. 30 tablet 0     No current facility-administered medications on file prior to visit.          Objective Findings:    Vital Signs:  /84   Pulse 67   Ht 5' 9" (1.753 m)   Wt 87.8 kg (193 lb 9 oz)   BMI 28.58 kg/m²   Body mass index is 28.58 kg/m².    Physical Exam:  General Appearance: Well appearing in no acute distress  Head:   Normocephalic, without obvious abnormality  Eyes:    No scleral icterus  ENT: Neck supple  Extremities: No edema  Skin: No rash  Neurologic: AAO x 3      Labs:  Lab Results   Component Value Date    WBC 6.44 09/24/2019    HGB 14.6 09/24/2019    HCT 44.1 09/24/2019     09/24/2019    CHOL 232 (H) 09/25/2018    TRIG 133 09/25/2018    HDL 51 09/25/2018    ALT 23 09/24/2019    AST 26 09/24/2019     09/24/2019    K 4.7 09/24/2019     09/24/2019    CREATININE 0.9 09/24/2019    BUN 16 09/24/2019    CO2 27 09/24/2019    TSH 1.503 09/24/2019       Imaging:  None  Endoscopy:    None    Assessment:    Mr. Peabody is a 44 y.o. WM with:    1. Esophageal dysphagia    2. Esophageal stricture    3. GERD without esophagitis      Dysphagia resolved with dilation.  He reports shortly after he developed heartburn but that has since resolved.  For now we discussed diet and lifestyle modifications related to reflux.  Continue Protonix in the morning.  He is going to stop taking the Protonix 1-2 weeks before next visit to see if his symptoms flare.    Recommendations:  1. PPI QAM  2.  GERD lifestyle and diet modifications    Pt to schedule f/u appt with me 10-12 weeks    Order summary:         Thank you so much " for allowing me to participate in the care of James Royden Peabody IV Stephanie B Barnard, FNP-C

## 2019-10-24 NOTE — PATIENT INSTRUCTIONS
F/u in 10-12 weeks. Continue taking the protonix 20 mg every morning. About 1-2 weeks prior to our visit, stop the protonix and we will assess whether or not if your symptoms return.    GOAL IS FOR YOU TO HAVE LESS THAN TWO EPISODES OF REFLUX PER WEEK.    [email or web address].refluxcookbook.com/  Dropping Acid The Reflux Diet Cookbook and Cure -  Igor George M.D.    GERD  Worst Foods for Acid Reflux  Chocolate (milk chocolate worse than dark chocolate)  Soda (all carbonated beverages)  Alcohol (beer, liquor, wine)  Fried foods  Alexander, sausage, ribs  Cream sauce  Fatty meats (beef)  Butter, margarine, lard, shortening  Coffee, tea  Mint   High fat nuts  Hot sauces and pepper  Citrus fruit/juices      Acidic foods (pH - 1 is MORE acidic, 5 is LESS acidic)     Do not eat or drink these (lower numbers are worse)    Induction diet - For 2 weeks eat nothing below pH 5     Lemon juice 2.3  Grape cranberry juice 2.5  Stomach Acid 2.5  Gelatin Dessert 2.6  Lemon/lime 2.9/2.7  Vinegar 2.9  Gatorade 3.0  Fruits - plums, apricots, strawberries, cherries 3.0  Vitamin C (ascorbic acid) 3.0  Iced tea, Snapple 3.1  Mustard 3.2  Soft drinks 3.3  Nectarines 3.3  Pomegranate 3.3  Applesauce 3.4  Grapefruit 3.4  Kiwi 3.4  Barbecue sauce 3.4  Caesar dressing 3.5  Thousand island dressing 3.6  Strawberries 3.5  Pineapple juice 3.5  Beer 3.5  Wine 3.5  Grape 3.6  Apples 3.6  Pineapple 3.7  Pickle 3.7  Blackberries, blueberries 3.7  Daniel 3.7  Orange 3.8  Cherries 3.9  Red Bull 3.9  Tomatoes 4.2  Coffee 5.1      These are Safe foods:  Agave  Aloe Vera  Apple (only red)  Bagels  Banana (worsens reflux in 1%)  Beans - black, red, lima, lentils  Bread - whole grain, rye  Caramel  Celery  Chamomile tea  Chicken - skinless, never fried  Chicken stock or bouillon  Coffee - one cup/day with milk  Fennel  Fish  Soraya  Green vegetables (no green peppers)  Herbs  Honey  Melon  Milk - skin, soy, or Lactaid skim milk  Mushrooms  Oatmeal  Olive  oil  Parsley  Pasta  Pears  Popcorn  Potatoes  Red bell peppers  Rice  Soups  Tofu  Turkey Breast  Turnip  Vegetables - no onion, tomatoes, peppers  Vinaigrette  Water - non carbonated  Whole grain breads, crackers, breakfast cereals      Best Foods for Acid Reflux  Whole grain breads  Oatmeal  Aloe Vera  Salad (no tomatoes, onions, cheese, or high fat dressing)  Banana  Melon  Fennel  Chicken and turkey (skinless, never fried)  Fish/seafood (never fried)  Celery  Parsley  Couscous and Rice    Maybe bad foods (Everyone is unique)  Tomatoes  Garlic  Onion  Nuts (macadamia nuts)  Apples (especially green)  Cucumber  Green peppers  Spicy food  Some herbal teas    GERD tips  Change what you eat:  Eat smaller meals  Eat slowly and chew thoroughly until food is almost liquid  Cut down on junk carbohydrates such as sugar and white flour  Use herbs in your cooking  Eat more raw foods (more than 10 ingredients is not a raw food)  Avoid trans fats and partially hydrogenated oils  Eat more fish and switch to grass fed beef  Switch your cooking oil to macadamia nut or olive oil  Watch extremes of salt intake (too high or too low is bad)    If just cutting out acidic foods is not enough, change how you eat:  Large breakfast, medium lunch, light dinner  Dont mix fruit juices, sweet fruits, and refined starches with meats and heavy food  Dont wash your food down with a lot of liquid      Change these habits:  Stop smoking  Eat dinner earlier (3-4 hours before lying down to sleep)  Elevate the head of your bed 6 inches (blocks under the head of the bed are better than pillows) OR [employer] sells a special [employer] Reflux relief system  That may be purchased online for a comfortable, individual solution for raising the head of the bed.  Exercise (but wait 2 hours after eating)  Drink more water (between meals)    Take these supplements:  Multi vitamin  Probiotic  Fish oil    Most common food allergens: milk, eggs, peanuts, tree  nuts, fish, shellfish, wheat, and soy    All natural immediate relief:  Chew 2-3 soft probiotic capsules - Dr. Leon's Probiotics 12 Plus  Chew chewable DGL licorice tablet  Chew papaya tablet with high protein meal - [employer]  Drink 2 ounces of aloe vera juice, aloe vera tablets  Swedish bitters  Prelief- reduce the acid in food to keep it form burning sensitive tissue  Iberogast  Slippery Elm  Drink Chamomile Tea  Teaspoon of baking soda in water  Spoonful of vinegar in water      All natural ulcer healers:  Zinc carnosine - 75.5 mg with food twice a day x 8 weeks   RubenI by Anna - $8 for 60 pills  DGL (deglycyrrhizinated licorice) - 2 tablets before meals. Heals stomach lining   Natural Factors brand, Enzymatic therapy brand.  Aloe Vera juice  - 2 to 8 ounces a day   [place] or [place]

## 2019-10-27 ENCOUNTER — PATIENT OUTREACH (OUTPATIENT)
Dept: ADMINISTRATIVE | Facility: OTHER | Age: 44
End: 2019-10-27

## 2019-10-30 ENCOUNTER — OFFICE VISIT (OUTPATIENT)
Dept: OTOLARYNGOLOGY | Facility: CLINIC | Age: 44
End: 2019-10-30
Payer: COMMERCIAL

## 2019-10-30 DIAGNOSIS — J31.0 CHRONIC RHINITIS: ICD-10-CM

## 2019-10-30 DIAGNOSIS — R09.82 POST-NASAL DRIP: ICD-10-CM

## 2019-10-30 DIAGNOSIS — R13.10 DYSPHAGIA, UNSPECIFIED TYPE: ICD-10-CM

## 2019-10-30 DIAGNOSIS — H61.22 IMPACTED CERUMEN OF LEFT EAR: Primary | ICD-10-CM

## 2019-10-30 PROCEDURE — 99999 PR PBB SHADOW E&M-EST. PATIENT-LVL II: ICD-10-PCS | Mod: PBBFAC,,, | Performed by: NURSE PRACTITIONER

## 2019-10-30 PROCEDURE — 69210 REMOVE IMPACTED EAR WAX UNI: CPT | Mod: S$GLB,,, | Performed by: NURSE PRACTITIONER

## 2019-10-30 PROCEDURE — 99203 PR OFFICE/OUTPT VISIT, NEW, LEVL III, 30-44 MIN: ICD-10-PCS | Mod: 25,S$GLB,, | Performed by: NURSE PRACTITIONER

## 2019-10-30 PROCEDURE — 69210 EAR CERUMEN REMOVAL: ICD-10-PCS | Mod: S$GLB,,, | Performed by: NURSE PRACTITIONER

## 2019-10-30 PROCEDURE — 99203 OFFICE O/P NEW LOW 30 MIN: CPT | Mod: 25,S$GLB,, | Performed by: NURSE PRACTITIONER

## 2019-10-30 PROCEDURE — 99999 PR PBB SHADOW E&M-EST. PATIENT-LVL II: CPT | Mod: PBBFAC,,, | Performed by: NURSE PRACTITIONER

## 2019-10-30 RX ORDER — AZELASTINE 1 MG/ML
1 SPRAY, METERED NASAL 2 TIMES DAILY
Qty: 30 ML | Refills: 3 | Status: SHIPPED | OUTPATIENT
Start: 2019-10-30 | End: 2021-05-10

## 2019-10-30 NOTE — LETTER
October 30, 2019      Gisel Rachel NP  [address]  [facility] [address]           [facility] - Otorhinolaryngology  [address]  [facility] [address]  Phone: [phone]  Fax: [phone]          Patient: James Royden Peabody IV   MR Number: [id number]   YOB: 1975   Date of Visit: 10/30/2019       Dear Gisel Rachel:    Thank you for referring James Peabody to me for evaluation. Attached you will find relevant portions of my assessment and plan of care.    If you have questions, please do not hesitate to call me. I look forward to following James Peabody along with you.    Sincerely,    Edmond Valencia, NP    Enclosure  CC:  No Recipients    If you would like to receive this communication electronically, please contact [email or web address] or [phone] to request more information on [employer] access.    For providers and/or their staff who would like to refer a patient to [facility], please contact us through our one-stop-shop provider referral line, [facility], at [phone].    If you feel you have received this communication in error or would no longer like to receive these types of communications, please e-mail [email or web address]

## 2019-10-30 NOTE — PROCEDURES
Ear Cerumen Removal  Date/Time: 10/30/2019 8:30 AM  Performed by: Edmond Valencia NP  Authorized by: Edmond Valencia NP     Location details:  Left ear  Procedure type: curette    Cerumen  Removal Results:  Cerumen completely removed  Patient tolerance:  Patient tolerated the procedure well with no immediate complications     Procedure Note:    The patient was brought to the minor procedure room and placed under the operating microscope of the left ear canal which was cleaned of ceruminous debris. Using a combination of suction, curettes and cup forceps the patient's cerumen impaction was removed. The tympanic membrane was evaluated and was unremarkable. The patient tolerated the procedure well. There were no complications.

## 2019-10-30 NOTE — PROGRESS NOTES
Subjective:      James Royden Peabody IV is a 44 y.o. male who was referred to me by Gisel Rachel in consultation for post-nasal drip.    Mr. Peabody reports persistent post-nasal drip for the past several years. He reports associated intermittent nasal congestion. He denies fever or sinus pressure. He was recently started on protonix for reflux. He states he has tried zyrtec-D for several years but feels he is no longer getting benefit from it.     Current sinonasal medications as above.  He does not regularly use nasal decongestant sprays.    He does not recall previously having allergy testing.    He denies a history of asthma.    He relates a history of reflux symptoms which is currently managed with Protonix.  He has previously had an EGD.    He denies have a diagnosis of obstructive sleep apnea.     He has not had sinonasal surgery.    He does not recall a prior history of nasal trauma.      Past Medical History  He has a past medical history of Hyperlipidemia.    Past Surgical History  He has a past surgical history that includes Hernia repair and Esophagogastroduodenoscopy (N/A, 10/8/2019).    Family History  His family history includes No Known Problems in his father and mother.    Social History  He reports that he has been smoking cigarettes. He has never used smokeless tobacco. He reports that he drinks alcohol. He reports that he does not use drugs.    Allergies  He has No Known Allergies.    Medications   He has a current medication list which includes the following prescription(s): cetirizine, naproxen, azelastine, and pantoprazole.    Review of Systems  Review of Systems   Constitutional: Negative for chills, fever and unexpected weight change.   HENT: Positive for congestion, hearing loss, postnasal drip and trouble swallowing. Negative for ear discharge, ear pain, sore throat and tinnitus.    Eyes: Negative for pain and visual disturbance.   Respiratory: Negative for apnea and shortness  of breath.    Cardiovascular: Negative for chest pain and palpitations.   Gastrointestinal: Negative for abdominal pain and nausea.   Endocrine: Negative for cold intolerance and heat intolerance.   Musculoskeletal: Negative for joint swelling and neck stiffness.   Skin: Negative for color change and rash.   Neurological: Negative for dizziness, facial asymmetry and headaches.   Hematological: Negative for adenopathy. Does not bruise/bleed easily.   Psychiatric/Behavioral: Negative for agitation and sleep disturbance. The patient is not nervous/anxious.           Objective:     There were no vitals taken for this visit.       Constitutional:   He is oriented to person, place, and time. Vital signs are normal. He appears well-developed and well-nourished. He appears alert. Normal speech.      Head:  Normocephalic and atraumatic.     Ears:    Right Ear: No lacerations. No drainage, swelling or tenderness. No foreign bodies. No mastoid tenderness. Tympanic membrane is not injected, not scarred, not perforated, not erythematous, not retracted and not bulging. Tympanic membrane mobility is normal. No middle ear effusion. No hemotympanum.   Left Ear: No lacerations. No drainage, swelling or tenderness. No foreign bodies. No mastoid tenderness. Tympanic membrane is not injected, not scarred, not perforated, not erythematous, not retracted and not bulging. Tympanic membrane mobility is normal.  No middle ear effusion. No hemotympanum.   Ears:      Nose:  Nose normal including turbinates, nasal mucosa, sinuses and nasal septum. Mucosal edema and rhinorrhea present. No nose lacerations, sinus tenderness, septal deviation, nasal septal hematoma or polyps. No epistaxis.  No foreign bodies. Right sinus exhibits no maxillary sinus tenderness and no frontal sinus tenderness. Left sinus exhibits no maxillary sinus tenderness and no frontal sinus tenderness.     Mouth/Throat  Oropharynx clear and moist without lesions or asymmetry,  normal uvula midline and lips, teeth, and gums normal. No uvula swelling, oral lesions, trismus, mucous membrane lesions or xerostomia. No oropharyngeal exudate or posterior oropharyngeal erythema.     Neck:  Neck normal without thyromegaly masses, asymmetry, normal tracheal structure, crepitus, and tenderness and no adenopathy.     Psychiatric:   He has a normal mood and affect. His speech is normal and behavior is normal.     Neurological:   He is alert and oriented to person, place, and time. No cranial nerve deficit.     Skin:   No abrasions, lacerations, lesions, or rashes.       Procedure    Cerumen removal performed.  See procedure note.        Data Reviewed    WBC (K/uL)   Date Value   09/24/2019 6.44     Eosinophil% (%)   Date Value   09/24/2019 4.0     Eos # (K/uL)   Date Value   09/24/2019 0.3     Platelets (K/uL)   Date Value   09/24/2019 245     Glucose (mg/dL)   Date Value   09/24/2019 81     No results found for: IGE    No sinus imaging available.       Assessment:     1. Impacted cerumen of left ear    2. Chronic rhinitis    3. Post-nasal drip    4. Dysphagia, unspecified type         Plan:     I had a long discussion with the patient regarding his condition and the further workup and management options.    Marito was seen today for sinusitis and sinus problem.    Diagnoses and all orders for this visit:    Impacted cerumen of left ear  -     Ear Cerumen Removal    Chronic rhinitis  -     azelastine (ASTELIN) 137 mcg (0.1 %) nasal spray; 1 spray (137 mcg total) by Nasal route 2 (two) times daily.    Post-nasal drip  -     azelastine (ASTELIN) 137 mcg (0.1 %) nasal spray; 1 spray (137 mcg total) by Nasal route 2 (two) times daily.    Dysphagia, unspecified type       -    Was started on Protonix a week ago. He will follow up with GI.    Follow up in about 1 month (around 11/30/2019).

## 2019-11-14 DIAGNOSIS — R13.10 DYSPHAGIA, UNSPECIFIED TYPE: ICD-10-CM

## 2019-11-14 RX ORDER — PANTOPRAZOLE SODIUM 20 MG/1
20 TABLET, DELAYED RELEASE ORAL DAILY
Qty: 30 TABLET | Refills: 0 | Status: SHIPPED | OUTPATIENT
Start: 2019-11-14 | End: 2020-01-21

## 2020-01-06 ENCOUNTER — PATIENT OUTREACH (OUTPATIENT)
Dept: ADMINISTRATIVE | Facility: OTHER | Age: 45
End: 2020-01-06

## 2020-01-08 ENCOUNTER — TELEPHONE (OUTPATIENT)
Dept: GASTROENTEROLOGY | Facility: CLINIC | Age: 45
End: 2020-01-08

## 2020-01-08 NOTE — TELEPHONE ENCOUNTER
MA called to confirm pt appointment on 1/9 with Gisel. Pt rescheduled to next week at the [facility] on 1/14 . MA will send a reminder to address on file and pt verbalized all understanding.

## 2020-01-13 ENCOUNTER — PATIENT MESSAGE (OUTPATIENT)
Dept: GASTROENTEROLOGY | Facility: CLINIC | Age: 45
End: 2020-01-13

## 2020-01-20 ENCOUNTER — PATIENT OUTREACH (OUTPATIENT)
Dept: ADMINISTRATIVE | Facility: OTHER | Age: 45
End: 2020-01-20

## 2020-01-21 ENCOUNTER — OFFICE VISIT (OUTPATIENT)
Dept: GASTROENTEROLOGY | Facility: CLINIC | Age: 45
End: 2020-01-21
Payer: COMMERCIAL

## 2020-01-21 VITALS
DIASTOLIC BLOOD PRESSURE: 70 MMHG | WEIGHT: 199.94 LBS | HEIGHT: 69 IN | SYSTOLIC BLOOD PRESSURE: 105 MMHG | BODY MASS INDEX: 29.61 KG/M2

## 2020-01-21 DIAGNOSIS — K21.9 GERD WITHOUT ESOPHAGITIS: Primary | ICD-10-CM

## 2020-01-21 DIAGNOSIS — K44.9 HIATAL HERNIA: ICD-10-CM

## 2020-01-21 PROCEDURE — 99213 OFFICE O/P EST LOW 20 MIN: CPT | Mod: S$GLB,,, | Performed by: NURSE PRACTITIONER

## 2020-01-21 PROCEDURE — 3008F PR BODY MASS INDEX (BMI) DOCUMENTED: ICD-10-PCS | Mod: CPTII,S$GLB,, | Performed by: NURSE PRACTITIONER

## 2020-01-21 PROCEDURE — 99999 PR PBB SHADOW E&M-EST. PATIENT-LVL III: CPT | Mod: PBBFAC,,, | Performed by: NURSE PRACTITIONER

## 2020-01-21 PROCEDURE — 99999 PR PBB SHADOW E&M-EST. PATIENT-LVL III: ICD-10-PCS | Mod: PBBFAC,,, | Performed by: NURSE PRACTITIONER

## 2020-01-21 PROCEDURE — 3008F BODY MASS INDEX DOCD: CPT | Mod: CPTII,S$GLB,, | Performed by: NURSE PRACTITIONER

## 2020-01-21 PROCEDURE — 99213 PR OFFICE/OUTPT VISIT, EST, LEVL III, 20-29 MIN: ICD-10-PCS | Mod: S$GLB,,, | Performed by: NURSE PRACTITIONER

## 2020-01-21 RX ORDER — OMEPRAZOLE 20 MG/1
20 TABLET, DELAYED RELEASE ORAL DAILY
COMMUNITY
End: 2023-01-24

## 2020-01-21 NOTE — PATIENT INSTRUCTIONS
Try Pepcid or tagamet instead of doubling the dose of prilosec.    GOAL is to have two or less episodes per week    Http://www.refluxcookbook.com/  Dropping Acid The Reflux Diet Cookbook and Cure -  Igor George M.D.    GERD  Worst Foods for Acid Reflux  Chocolate (milk chocolate worse than dark chocolate)  Soda (all carbonated beverages)  Alcohol (beer, liquor, wine)  Fried foods  Alexander, sausage, ribs  Cream sauce  Fatty meats (beef)  Butter, margarine, lard, shortening  Coffee, tea  Mint   High fat nuts  Hot sauces and pepper  Citrus fruit/juices      Acidic foods (pH - 1 is MORE acidic, 5 is LESS acidic)     Do not eat or drink these (lower numbers are worse)    Induction diet - For 2 weeks eat nothing below pH 5     Lemon juice 2.3  Grape cranberry juice 2.5  Stomach Acid 2.5  Gelatin Dessert 2.6  Lemon/lime 2.9/2.7  Vinegar 2.9  Gatorade 3.0  Fruits - plums, apricots, strawberries, cherries 3.0  Vitamin C (ascorbic acid) 3.0  Iced tea, Snapple 3.1  Mustard 3.2  Soft drinks 3.3  Nectarines 3.3  Pomegranate 3.3  Applesauce 3.4  Grapefruit 3.4  Kiwi 3.4  Barbecue sauce 3.4  Caesar dressing 3.5  Thousand island dressing 3.6  Strawberries 3.5  Pineapple juice 3.5  Beer 3.5  Wine 3.5  Grape 3.6  Apples 3.6  Pineapple 3.7  Pickle 3.7  Blackberries, blueberries 3.7  [place] 3.7  Orange 3.8  Cherries 3.9  Red Bull 3.9  Tomatoes 4.2  Coffee 5.1      These are Safe foods:  Agave  Aloe Vera  Apple (only red)  Bagels  Banana (worsens reflux in 1%)  Beans - black, red, lima, lentils  Bread - whole grain, rye  Caramel  Celery  Chamomile tea  Chicken - skinless, never fried  Chicken stock or bouillon  Coffee - one cup/day with milk  Fennel  Fish  Soraya  Green vegetables (no green peppers)  Herbs  Honey  Melon  Milk - skin, soy, or Lactaid skim milk  Mushrooms  Oatmeal  Olive oil  Parsley  Pasta  Pears  Popcorn  Potatoes  Red bell peppers  Rice  Soups  Tofu  Turkey Breast  Turnip  Vegetables - no onion, tomatoes,  peppers  Vinaigrette  Water - non carbonated  Whole grain breads, crackers, breakfast cereals      Best Foods for Acid Reflux  Whole grain breads  Oatmeal  Aloe Vera  Salad (no tomatoes, onions, cheese, or high fat dressing)  Banana  Melon  Fennel  Chicken and turkey (skinless, never fried)  Fish/seafood (never fried)  Celery  Parsley  Couscous and Rice    Maybe bad foods (Everyone is unique)  Tomatoes  Garlic  Onion  Nuts (macadamia nuts)  Apples (especially green)  Cucumber  Green peppers  Spicy food  Some herbal teas    GERD tips  Change what you eat:  Eat smaller meals  Eat slowly and chew thoroughly until food is almost liquid  Cut down on junk carbohydrates such as sugar and white flour  Use herbs in your cooking  Eat more raw foods (more than 10 ingredients is not a raw food)  Avoid trans fats and partially hydrogenated oils  Eat more fish and switch to grass fed beef  Switch your cooking oil to macadamia nut or olive oil  Watch extremes of salt intake (too high or too low is bad)    If just cutting out acidic foods is not enough, change how you eat:  Large breakfast, medium lunch, light dinner  Dont mix fruit juices, sweet fruits, and refined starches with meats and heavy food  Dont wash your food down with a lot of liquid      Change these habits:  Stop smoking  Eat dinner earlier (3-4 hours before lying down to sleep)  Elevate the head of your bed 6 inches (blocks under the head of the bed are better than pillows) OR [employer] sells a special mGaadi Reflux relief system  That may be purchased online for a comfortable, individual solution for raising the head of the bed.  Exercise (but wait 2 hours after eating)  Drink more water (between meals)    Take these supplements:  Multi vitamin  Probiotic  Fish oil    Most common food allergens: milk, eggs, peanuts, tree nuts, fish, shellfish, wheat, and soy    All natural immediate relief:  Chew 2-3 soft probiotic capsules - Dr. Leon's Probiotics 12  Plus  Chew chewable DGL licorice tablet  Chew papaya tablet with high protein meal - [employer]  Drink 2 ounces of aloe vera juice, or aloe vera tablets  Swedish bitters  Prelief- reduce the acid in food to keep it form burning sensitive tissue  Iberogast  Slippery Elm  Drink Chamomile Tea  Teaspoon of baking soda in water  Spoonful of vinegar in water      All natural ulcer healers:  Zinc carnosine - 75.5 mg with food twice a day x 8 weeks   Rai Castillo - $8 for 60 pills  DGL (deglycyrrhizinated licorice) - 2 tablets before meals. Heals stomach lining   Natural Factors brand, Enzymatic therapy brand.  Aloe Vera juice  - 2 to 8 ounces a day   [place] or [place]

## 2020-01-21 NOTE — PROGRESS NOTES
[facility] Consultation Note    Reason for Consult:  The primary encounter diagnosis was GERD without esophagitis. A diagnosis of Hiatal hernia was also pertinent to this visit.    PCP:   M[facility] Betts       Referring MD:  No referring provider defined for this encounter.    HPI:    Previous note 10/7/19  This is a 44 y.o. male here for evaluation of dysphagia. He is a new patient.    Dysphagia started a year ago. Off and on. Gets bad. Happens majority of the weak.  Feels like food gets stuck such as steak bread, meats.  Liquids no issue.  Denies heart burn, N/V, abd pain regurgitation.  Swelling under chin.  Taking protonix 20 mg every morning.  No overt GI bleeding  + Coughing    10/24/19  Patient had EGD on October 8 showed a benign appearing stenosis.  It was dilated.  Biopsies normal.  Now he is actually having heart burn. Happened shortly after EGD. But has slowly improved.  No n/v.   Difficulty swallowing has resolved.     Interval hx 1/21/2020  Unable to wean off the PPI. Has to double up on PPI therapy about 3 days out of the week. Does notice his diet is a contributing factor. Denies dysphagia    ROS:  Constitutional: No fevers, chills, No weight loss  ENT: No allergies, +PND  CV: No chest pain  Pulm: No cough, No shortness of breath  Ophtho: No vision changes  GI: see HPI  Derm: No rash  Heme: + lymphadenopathy, No bruising  MSK: No arthritis  : No dysuria, No hematuria  Neuro: No syncope, No seizure  Psych: + anxiety, No depression    Medical History:  has a past medical history of Hyperlipidemia.    Surgical History:  has a past surgical history that includes Hernia repair and Esophagogastroduodenoscopy (N/A, 10/8/2019).    Family History: family history includes No Known Problems in his father and mother..     Social History:  reports that he has been smoking cigarettes. He has never used smokeless tobacco. He reports that he drinks alcohol. He reports that he does not  "use drugs.    Review of patient's allergies indicates:  No Known Allergies    Current Outpatient Medications on File Prior to Visit   Medication Sig Dispense Refill    azelastine (ASTELIN) 137 mcg (0.1 %) nasal spray 1 spray (137 mcg total) by Nasal route 2 (two) times daily. (Patient taking differently: 1 spray by Nasal route 2 (two) times daily. ) 30 mL 3    cetirizine (ZYRTEC) 10 MG tablet Take 10 mg by mouth daily as needed for Allergies.      omeprazole (PRILOSEC OTC) 20 MG tablet Take 20 mg by mouth once daily.      pantoprazole (PROTONIX) 20 MG tablet Take 1 tablet (20 mg total) by mouth once daily. 30 tablet 0    naproxen (NAPROSYN) 500 MG tablet Take 1 tablet (500 mg total) by mouth 2 (two) times daily. (Patient not taking: Reported on 1/21/2020) 60 tablet 2     No current facility-administered medications on file prior to visit.          Objective Findings:    Vital Signs:  /70 (BP Location: Left arm)   Ht 5' 9" (1.753 m)   Wt 90.7 kg (199 lb 15.3 oz)   BMI 29.53 kg/m²   Body mass index is 29.53 kg/m².    Physical Exam:  General Appearance: Well appearing in no acute distress  Head:   Normocephalic, without obvious abnormality  Eyes:    No scleral icterus  ENT: Neck supple  Extremities: No edema  Skin: No rash  Neurologic: AAO x 3      Labs:  Lab Results   Component Value Date    WBC 6.44 09/24/2019    HGB 14.6 09/24/2019    HCT 44.1 09/24/2019     09/24/2019    CHOL 232 (H) 09/25/2018    TRIG 133 09/25/2018    HDL 51 09/25/2018    ALT 23 09/24/2019    AST 26 09/24/2019     09/24/2019    K 4.7 09/24/2019     09/24/2019    CREATININE 0.9 09/24/2019    BUN 16 09/24/2019    CO2 27 09/24/2019    TSH 1.503 09/24/2019       Imaging:  None  Endoscopy:    EGD 10/2019 -Medium-sized hiatal hernia.                        - Benign-appearing esophageal stenosis. Dilated.                         Biopsied.                        - Normal stomach.                        - Normal examined " duodenum.    Assessment:    Mr. Peabody is a 44 y.o. WM with:    1. GERD without esophagitis    2. Hiatal hernia      Heart burn started after EGD with Dil. We did discuss his hiatal hernia today and how that contributes to GERD. Has had to double up on PPI dose ~ 3 days out of the week. We discussed trying Pepcid or tagamet instead.     Recommendations:  1. PPI QAM. H2RA in pm PRN  2.  GERD lifestyle and diet modifications per AVS    F/u PRN if sx worsen or change    Order summary:         Thank you so much for allowing me to participate in the care of James Royden Peabody IV    Gisel Rachel, FERNANDOP-C

## 2020-01-29 ENCOUNTER — OFFICE VISIT (OUTPATIENT)
Dept: URGENT CARE | Facility: CLINIC | Age: 45
End: 2020-01-29
Payer: COMMERCIAL

## 2020-01-29 VITALS
RESPIRATION RATE: 18 BRPM | OXYGEN SATURATION: 96 % | BODY MASS INDEX: 28.44 KG/M2 | DIASTOLIC BLOOD PRESSURE: 82 MMHG | WEIGHT: 192 LBS | SYSTOLIC BLOOD PRESSURE: 125 MMHG | TEMPERATURE: 98 F | HEIGHT: 69 IN | HEART RATE: 75 BPM

## 2020-01-29 DIAGNOSIS — J06.9 UPPER RESPIRATORY TRACT INFECTION, UNSPECIFIED TYPE: Primary | ICD-10-CM

## 2020-01-29 DIAGNOSIS — Z20.828 EXPOSURE TO INFLUENZA: ICD-10-CM

## 2020-01-29 LAB
CTP QC/QA: YES
FLUAV AG NPH QL: NEGATIVE
FLUBV AG NPH QL: NEGATIVE

## 2020-01-29 PROCEDURE — 99213 PR OFFICE/OUTPT VISIT, EST, LEVL III, 20-29 MIN: ICD-10-PCS | Mod: 25,S$GLB,, | Performed by: NURSE PRACTITIONER

## 2020-01-29 PROCEDURE — 99213 OFFICE O/P EST LOW 20 MIN: CPT | Mod: 25,S$GLB,, | Performed by: NURSE PRACTITIONER

## 2020-01-29 PROCEDURE — 87804 POCT INFLUENZA A/B: ICD-10-PCS | Mod: 59,QW,S$GLB, | Performed by: NURSE PRACTITIONER

## 2020-01-29 PROCEDURE — 87804 INFLUENZA ASSAY W/OPTIC: CPT | Mod: QW,S$GLB,, | Performed by: NURSE PRACTITIONER

## 2020-01-29 RX ORDER — PANTOPRAZOLE SODIUM 20 MG/1
TABLET, DELAYED RELEASE ORAL
COMMUNITY
Start: 2020-01-21 | End: 2021-05-10

## 2020-01-29 NOTE — PATIENT INSTRUCTIONS
Continue OTC anthistamine and mucinex    RED FLAGS/WARNING SYMPTOMS DISCUSSED WITH PATIENT THAT WOULD WARRANT EMERGENT MEDICAL ATTENTION. PATIENT VERBALIZED UNDERSTANDING.     Please follow up with your Primary care provider within 2-5 days if your signs and symptoms have not resolved or worsen.     If your condition worsens or fails to improve we recommend that you receive another evaluation at the emergency room immediately or contact your primary medical clinic to discuss your concerns.   You must understand that you have received an Urgent Care treatment only and that you may be released before all of your medical problems are known or treated. You, the patient, will arrange for follow up care as instructed.       Viral Upper Respiratory Illness (Adult)  You have a viral upper respiratory illness (URI), which is another term for the common cold. This illness is contagious during the first few days. It is spread through the air by coughing and sneezing. It may also be spread by direct contact (touching the sick person and then touching your own eyes, nose, or mouth). Frequent handwashing will decrease risk of spread. Most viral illnesses go away within 7 to 10 days with rest and simple home remedies. Sometimes the illness may last for several weeks. Antibiotics will not kill a virus, and they are generally not prescribed for this condition.    Home care  · If symptoms are severe, rest at home for the first 2 to 3 days. When you resume activity, don't let yourself get too tired.  · Avoid being exposed to cigarette smoke (yours or others).  · You may use acetaminophen or ibuprofen to control pain and fever, unless another medicine was prescribed. (Note: If you have chronic liver or kidney disease, have ever had a stomach ulcer or gastrointestinal bleeding, or are taking blood-thinning medicines, talk with your healthcare provider before using these medicines.) Aspirin should never be given to anyone under 18 years  of age who is ill with a viral infection or fever. It may cause severe liver or brain damage.  · Your appetite may be poor, so a light diet is fine. Avoid dehydration by drinking 6 to 8 glasses of fluids per day (water, soft drinks, juices, tea, or soup). Extra fluids will help loosen secretions in the nose and lungs.  · Over-the-counter cold medicines will not shorten the length of time youre sick, but they may be helpful for the following symptoms: cough, sore throat, and nasal and sinus congestion. (Note: Do not use decongestants if you have high blood pressure.)  Follow-up care  Follow up with your healthcare provider, or as advised.  When to seek medical advice  Call your healthcare provider right away if any of these occur:  · Cough with lots of colored sputum (mucus)  · Severe headache; face, neck, or ear pain  · Difficulty swallowing due to throat pain  · Fever of 100.4°F (38°C)  Call 911, or get immediate medical care  Call emergency services right away if any of these occur:  · Chest pain, shortness of breath, wheezing, or difficulty breathing  · Coughing up blood  · Inability to swallow due to throat pain  Date Last Reviewed: 9/13/2015  © 8459-0348 [employer]. [address] [place] [address], [place] [address]. All rights reserved. This information is not intended as a substitute for professional medical care. Always follow your healthcare professional's instructions.

## 2020-01-29 NOTE — PROGRESS NOTES
"Subjective:       Patient ID: James Royden Peabody IV is a 44 y.o. male.    Vitals:  height is 5' 9" (1.753 m) and weight is 87.1 kg (192 lb). His temperature is 97.6 °F (36.4 °C). His blood pressure is 125/82 and his pulse is 75. His respiration is 18 and oxygen saturation is 96%.     Chief Complaint: Cough    Pt states his son and wife was diagnosed with the flu yesterday, and he started feeling ill yesterday.     Cough   This is a new problem. The current episode started yesterday. The problem has been unchanged. The cough is non-productive. Associated symptoms include chills. Pertinent negatives include no ear pain, eye redness, fever, hemoptysis, myalgias, rash, sore throat, shortness of breath or wheezing. Nothing aggravates the symptoms.       Constitution: Positive for chills. Negative for sweating, fatigue and fever.   HENT: Negative for ear pain, congestion, sinus pain, sinus pressure, sore throat and voice change.    Neck: Negative for painful lymph nodes.   Eyes: Negative for eye redness.   Respiratory: Negative for chest tightness, cough, sputum production, bloody sputum, COPD, shortness of breath, stridor, wheezing and asthma.    Gastrointestinal: Negative for nausea and vomiting.   Musculoskeletal: Negative for muscle ache.   Skin: Negative for rash.   Allergic/Immunologic: Negative for seasonal allergies and asthma.   Hematologic/Lymphatic: Negative for swollen lymph nodes.       Objective:      Physical Exam   Constitutional: He is oriented to person, place, and time. He appears well-developed and well-nourished. He is cooperative.  Non-toxic appearance. He does not appear ill. No distress.   HENT:   Head: Normocephalic and atraumatic.   Right Ear: Hearing, tympanic membrane, external ear and ear canal normal.   Left Ear: Hearing, tympanic membrane, external ear and ear canal normal.   Nose: Nose normal. No mucosal edema, rhinorrhea or nasal deformity. No epistaxis. Right sinus exhibits no maxillary " sinus tenderness and no frontal sinus tenderness. Left sinus exhibits no maxillary sinus tenderness and no frontal sinus tenderness.   Mouth/Throat: Uvula is midline, oropharynx is clear and moist and mucous membranes are normal. No trismus in the jaw. Normal dentition. No uvula swelling. No posterior oropharyngeal erythema.   Eyes: Conjunctivae and lids are normal. Right eye exhibits no discharge. Left eye exhibits no discharge. No scleral icterus.   Neck: Trachea normal, normal range of motion, full passive range of motion without pain and phonation normal. Neck supple.   Cardiovascular: Normal rate, regular rhythm, normal heart sounds, intact distal pulses and normal pulses.   Pulmonary/Chest: Effort normal and breath sounds normal. No respiratory distress.   Abdominal: Soft. Normal appearance and bowel sounds are normal. He exhibits no distension, no pulsatile midline mass and no mass. There is no tenderness.   Musculoskeletal: Normal range of motion. He exhibits no edema or deformity.   Neurological: He is alert and oriented to person, place, and time. He exhibits normal muscle tone. Coordination normal.   Skin: Skin is warm, dry, intact, not diaphoretic and not pale.   Psychiatric: He has a normal mood and affect. His speech is normal and behavior is normal. Judgment and thought content normal. Cognition and memory are normal.   Nursing note and vitals reviewed.        Assessment:       1. Upper respiratory tract infection, unspecified type    2. Exposure to influenza        Plan:         Upper respiratory tract infection, unspecified type    Exposure to influenza  -     POCT Influenza A/B         Continue OTC anthistamine and mucinex    RED FLAGS/WARNING SYMPTOMS DISCUSSED WITH PATIENT THAT WOULD WARRANT EMERGENT MEDICAL ATTENTION. PATIENT VERBALIZED UNDERSTANDING.     Please follow up with your Primary care provider within 2-5 days if your signs and symptoms have not resolved or worsen.     If your condition  worsens or fails to improve we recommend that you receive another evaluation at the emergency room immediately or contact your primary medical clinic to discuss your concerns.   You must understand that you have received an Urgent Care treatment only and that you may be released before all of your medical problems are known or treated. You, the patient, will arrange for follow up care as instructed.       Viral Upper Respiratory Illness (Adult)  You have a viral upper respiratory illness (URI), which is another term for the common cold. This illness is contagious during the first few days. It is spread through the air by coughing and sneezing. It may also be spread by direct contact (touching the sick person and then touching your own eyes, nose, or mouth). Frequent handwashing will decrease risk of spread. Most viral illnesses go away within 7 to 10 days with rest and simple home remedies. Sometimes the illness may last for several weeks. Antibiotics will not kill a virus, and they are generally not prescribed for this condition.    Home care  · If symptoms are severe, rest at home for the first 2 to 3 days. When you resume activity, don't let yourself get too tired.  · Avoid being exposed to cigarette smoke (yours or others).  · You may use acetaminophen or ibuprofen to control pain and fever, unless another medicine was prescribed. (Note: If you have chronic liver or kidney disease, have ever had a stomach ulcer or gastrointestinal bleeding, or are taking blood-thinning medicines, talk with your healthcare provider before using these medicines.) Aspirin should never be given to anyone under 18 years of age who is ill with a viral infection or fever. It may cause severe liver or brain damage.  · Your appetite may be poor, so a light diet is fine. Avoid dehydration by drinking 6 to 8 glasses of fluids per day (water, soft drinks, juices, tea, or soup). Extra fluids will help loosen secretions in the nose and  lungs.  · Over-the-counter cold medicines will not shorten the length of time youre sick, but they may be helpful for the following symptoms: cough, sore throat, and nasal and sinus congestion. (Note: Do not use decongestants if you have high blood pressure.)  Follow-up care  Follow up with your healthcare provider, or as advised.  When to seek medical advice  Call your healthcare provider right away if any of these occur:  · Cough with lots of colored sputum (mucus)  · Severe headache; face, neck, or ear pain  · Difficulty swallowing due to throat pain  · Fever of 100.4°F (38°C)  Call 911, or get immediate medical care  Call emergency services right away if any of these occur:  · Chest pain, shortness of breath, wheezing, or difficulty breathing  · Coughing up blood  · Inability to swallow due to throat pain  Date Last Reviewed: 9/13/2015  © 0879-2330 [employer]. [address] [place] [address], [place] [address]. All rights reserved. This information is not intended as a substitute for professional medical care. Always follow your healthcare professional's instructions.

## 2020-01-31 ENCOUNTER — OFFICE VISIT (OUTPATIENT)
Dept: URGENT CARE | Facility: CLINIC | Age: 45
End: 2020-01-31
Payer: COMMERCIAL

## 2020-01-31 VITALS
HEIGHT: 69 IN | WEIGHT: 192 LBS | OXYGEN SATURATION: 97 % | BODY MASS INDEX: 28.44 KG/M2 | RESPIRATION RATE: 16 BRPM | TEMPERATURE: 99 F | DIASTOLIC BLOOD PRESSURE: 76 MMHG | HEART RATE: 60 BPM | SYSTOLIC BLOOD PRESSURE: 121 MMHG

## 2020-01-31 DIAGNOSIS — F17.200 TOBACCO DEPENDENCE: ICD-10-CM

## 2020-01-31 DIAGNOSIS — J06.9 VIRAL URI WITH COUGH: Primary | ICD-10-CM

## 2020-01-31 LAB
CTP QC/QA: YES
FLUAV AG NPH QL: NEGATIVE
FLUBV AG NPH QL: NEGATIVE

## 2020-01-31 PROCEDURE — 99214 OFFICE O/P EST MOD 30 MIN: CPT | Mod: 25,S$GLB,, | Performed by: FAMILY MEDICINE

## 2020-01-31 PROCEDURE — 87804 POCT INFLUENZA A/B: ICD-10-PCS | Mod: 59,QW,S$GLB, | Performed by: FAMILY MEDICINE

## 2020-01-31 PROCEDURE — 87804 INFLUENZA ASSAY W/OPTIC: CPT | Mod: QW,S$GLB,, | Performed by: FAMILY MEDICINE

## 2020-01-31 PROCEDURE — 99214 PR OFFICE/OUTPT VISIT, EST, LEVL IV, 30-39 MIN: ICD-10-PCS | Mod: 25,S$GLB,, | Performed by: FAMILY MEDICINE

## 2020-01-31 NOTE — PROGRESS NOTES
"Subjective:       Patient ID: James Royden Peabody IV is a 44 y.o. male.    Vitals:  height is 5' 9" (1.753 m) and weight is 87.1 kg (192 lb). His oral temperature is 99.3 °F (37.4 °C). His blood pressure is 121/76 and his pulse is 60. His respiration is 16 and oxygen saturation is 97%.     Chief Complaint: Fever and Cough    Patient reports she was seen on 1/29/2020.Patient states that he tested negative for Flu,but wife and son have both have flu.Patient reports fever (not over 100) started today and productive cough started yesterday.    Fever    This is a new problem. The current episode started today. The problem occurs constantly. The problem has been unchanged. The maximum temperature noted was 100 to 100.9 F. The temperature was taken using a tympanic thermometer. Associated symptoms include coughing, headaches, muscle aches and a sore throat. Pertinent negatives include no congestion, ear pain, nausea, rash, vomiting or wheezing. He has tried acetaminophen (lat dose 7 am today) for the symptoms. The treatment provided mild relief.   Risk factors: sick contacts    Cough   This is a new problem. The current episode started yesterday. The problem has been gradually worsening. Associated symptoms include a fever (100 at home), headaches, myalgias, nasal congestion, postnasal drip, rhinorrhea and a sore throat. Pertinent negatives include no chills, ear pain, eye redness, hemoptysis, rash, shortness of breath or wheezing. The symptoms are aggravated by lying down. Treatments tried: advil cold and sinus,Ny Quil. The treatment provided mild relief. There is no history of asthma or bronchitis.       Constitution: Positive for fever (100 at home). Negative for chills, sweating and fatigue.   HENT: Positive for postnasal drip, sinus pain, sinus pressure and sore throat. Negative for ear pain, congestion and voice change.         Runny nose   Neck: Negative for painful lymph nodes.   Eyes: Negative for eye redness. "   Respiratory: Positive for cough and sputum production. Negative for chest tightness, bloody sputum, COPD, shortness of breath, stridor, wheezing and asthma.    Gastrointestinal: Negative for nausea and vomiting.   Musculoskeletal: Positive for muscle ache.   Skin: Negative for rash.   Allergic/Immunologic: Negative for seasonal allergies and asthma.   Neurological: Positive for headaches.   Hematologic/Lymphatic: Negative for swollen lymph nodes.       Objective:      Physical Exam   Constitutional: He is oriented to person, place, and time. He appears well-developed and well-nourished. He is cooperative.  Non-toxic appearance. He does not have a sickly appearance. He does not appear ill. No distress.   HENT:   Head: Normocephalic and atraumatic.   Right Ear: Hearing, tympanic membrane, external ear and ear canal normal. No middle ear effusion.   Left Ear: Hearing, tympanic membrane, external ear and ear canal normal.  No middle ear effusion.   Nose: Mucosal edema present. No rhinorrhea or nasal deformity. No epistaxis. Right sinus exhibits no maxillary sinus tenderness and no frontal sinus tenderness. Left sinus exhibits no maxillary sinus tenderness and no frontal sinus tenderness.   Mouth/Throat: Uvula is midline, oropharynx is clear and moist and mucous membranes are normal. No trismus in the jaw. Normal dentition. No uvula swelling. No oropharyngeal exudate, posterior oropharyngeal edema or posterior oropharyngeal erythema.   Eyes: Conjunctivae and lids are normal. No scleral icterus.   Neck: Trachea normal, full passive range of motion without pain and phonation normal. Neck supple. No neck rigidity. No edema and no erythema present.   Cardiovascular: Normal rate, regular rhythm, normal heart sounds, intact distal pulses and normal pulses.   Pulmonary/Chest: Effort normal and breath sounds normal. No respiratory distress. He has no decreased breath sounds. He has no wheezes. He has no rhonchi. He has no  rales.   Abdominal: Normal appearance.   Musculoskeletal: Normal range of motion. He exhibits no edema or deformity.   Neurological: He is alert and oriented to person, place, and time. He exhibits normal muscle tone. Coordination normal.   Skin: Skin is warm, dry, intact, not diaphoretic and not pale.   Psychiatric: He has a normal mood and affect. His speech is normal and behavior is normal. Judgment and thought content normal. Cognition and memory are normal.   Nursing note and vitals reviewed.        Results for orders placed or performed in visit on 01/31/20   POCT Influenza A/B   Result Value Ref Range    Rapid Influenza A Ag Negative Negative    Rapid Influenza B Ag Negative Negative     Acceptable Yes        Assessment:       1. Viral URI with cough    2. Tobacco dependence        Plan:         Viral URI with cough  -     POCT Influenza A/B    Tobacco dependence  -     Ambulatory referral to Smoking Cessation Program    declined cough medication    Patient Instructions   PLEASE READ YOUR DISCHARGE INSTRUCTIONS ENTIRELY AS IT CONTAINS IMPORTANT INFORMATION.      Please drink plenty of fluids.    Please get plenty of rest.    Please return here or go to the Emergency Department for any concerns or worsening of condition.    Please take an over the counter antihistamine medication (allegra/Claritin/Zyrtec) of your choice as directed.    Try an over the counter decongestant like Mucinex D or Sudafed. You buy this behind the pharmacy counter    If you do have Hypertension or palpitations, it is safe to take Coricidin HBP for relief of sinus symptoms.    If not allergic, please take over the counter Tylenol (Acetaminophen) and/or Motrin (Ibuprofen) as directed for control of pain and/or fever.  Please follow up with your primary care doctor or specialist as needed.    Sore throat recommendations: Warm fluids, warm salt water gargles, throat lozenges, tea, honey, soup, rest, hydration.    Use over  the counter flonase: one spray each nostril twice daily OR two sprays each nostril once daily.     Sinus rinses DO NOT USE TAP WATER, if you must, water must be a rolling boil for 1 minute, let it cool, then use.  May use distilled water, or over the counter nasal saline rinses.  Vics vapor rub in shower to help open nasal passages.  May use nasal gel to keep passages moisturized.  May use Nasal saline sprays during the day for added relief of congestion.   For those who go to the gym, please do not use the sauna or steam room now to clear sinuses.    If you  smoke, please stop smoking.      Please return or see your primary care doctor if you develop new or worsening symptoms.     Please arrange follow up with your primary medical clinic as soon as possible. You must understand that you've received an Urgent Care treatment only and that you may be released before all of your medical problems are known or treated. You, the patient, will arrange for follow up as instructed. If your symptoms worsen or fail to improve you should go to the Emergency Room.    Viral Upper Respiratory Illness (Adult)  You have a viral upper respiratory illness (URI), which is another term for the common cold. This illness is contagious during the first few days. It is spread through the air by coughing and sneezing. It may also be spread by direct contact (touching the sick person and then touching your own eyes, nose, or mouth). Frequent handwashing will decrease risk of spread. Most viral illnesses go away within 7 to 10 days with rest and simple home remedies. Sometimes the illness may last for several weeks. Antibiotics will not kill a virus, and they are generally not prescribed for this condition.    Home care  · If symptoms are severe, rest at home for the first 2 to 3 days. When you resume activity, don't let yourself get too tired.  · Avoid being exposed to cigarette smoke (yours or others).  · You may use acetaminophen or  ibuprofen to control pain and fever, unless another medicine was prescribed. (Note: If you have chronic liver or kidney disease, have ever had a stomach ulcer or gastrointestinal bleeding, or are taking blood-thinning medicines, talk with your healthcare provider before using these medicines.) Aspirin should never be given to anyone under 18 years of age who is ill with a viral infection or fever. It may cause severe liver or brain damage.  · Your appetite may be poor, so a light diet is fine. Avoid dehydration by drinking 6 to 8 glasses of fluids per day (water, soft drinks, juices, tea, or soup). Extra fluids will help loosen secretions in the nose and lungs.  · Over-the-counter cold medicines will not shorten the length of time youre sick, but they may be helpful for the following symptoms: cough, sore throat, and nasal and sinus congestion. (Note: Do not use decongestants if you have high blood pressure.)  Follow-up care  Follow up with your healthcare provider, or as advised.  When to seek medical advice  Call your healthcare provider right away if any of these occur:  · Cough with lots of colored sputum (mucus)  · Severe headache; face, neck, or ear pain  · Difficulty swallowing due to throat pain  · Fever of 100.4°F (38°C)  Call 911, or get immediate medical care  Call emergency services right away if any of these occur:  · Chest pain, shortness of breath, wheezing, or difficulty breathing  · Coughing up blood  · Inability to swallow due to throat pain  Date Last Reviewed: 9/13/2015  © 4198-9442 [employer]. [address] [place] [address], [place] [address]. All rights reserved. This information is not intended as a substitute for professional medical care. Always follow your healthcare professional's instructions.

## 2020-01-31 NOTE — PATIENT INSTRUCTIONS
PLEASE READ YOUR DISCHARGE INSTRUCTIONS ENTIRELY AS IT CONTAINS IMPORTANT INFORMATION.      Please drink plenty of fluids.    Please get plenty of rest.    Please return here or go to the Emergency Department for any concerns or worsening of condition.    Please take an over the counter antihistamine medication (allegra/Claritin/Zyrtec) of your choice as directed.    Try an over the counter decongestant like Mucinex D or Sudafed. You buy this behind the pharmacy counter    If you do have Hypertension or palpitations, it is safe to take Coricidin HBP for relief of sinus symptoms.    If not allergic, please take over the counter Tylenol (Acetaminophen) and/or Motrin (Ibuprofen) as directed for control of pain and/or fever.  Please follow up with your primary care doctor or specialist as needed.    Sore throat recommendations: Warm fluids, warm salt water gargles, throat lozenges, tea, honey, soup, rest, hydration.    Use over the counter flonase: one spray each nostril twice daily OR two sprays each nostril once daily.     Sinus rinses DO NOT USE TAP WATER, if you must, water must be a rolling boil for 1 minute, let it cool, then use.  May use distilled water, or over the counter nasal saline rinses.  Vics vapor rub in shower to help open nasal passages.  May use nasal gel to keep passages moisturized.  May use Nasal saline sprays during the day for added relief of congestion.   For those who go to the gym, please do not use the sauna or steam room now to clear sinuses.    If you  smoke, please stop smoking.      Please return or see your primary care doctor if you develop new or worsening symptoms.     Please arrange follow up with your primary medical clinic as soon as possible. You must understand that you've received an Urgent Care treatment only and that you may be released before all of your medical problems are known or treated. You, the patient, will arrange for follow up as instructed. If your symptoms worsen  or fail to improve you should go to the Emergency Room.    Viral Upper Respiratory Illness (Adult)  You have a viral upper respiratory illness (URI), which is another term for the common cold. This illness is contagious during the first few days. It is spread through the air by coughing and sneezing. It may also be spread by direct contact (touching the sick person and then touching your own eyes, nose, or mouth). Frequent handwashing will decrease risk of spread. Most viral illnesses go away within 7 to 10 days with rest and simple home remedies. Sometimes the illness may last for several weeks. Antibiotics will not kill a virus, and they are generally not prescribed for this condition.    Home care  · If symptoms are severe, rest at home for the first 2 to 3 days. When you resume activity, don't let yourself get too tired.  · Avoid being exposed to cigarette smoke (yours or others).  · You may use acetaminophen or ibuprofen to control pain and fever, unless another medicine was prescribed. (Note: If you have chronic liver or kidney disease, have ever had a stomach ulcer or gastrointestinal bleeding, or are taking blood-thinning medicines, talk with your healthcare provider before using these medicines.) Aspirin should never be given to anyone under 18 years of age who is ill with a viral infection or fever. It may cause severe liver or brain damage.  · Your appetite may be poor, so a light diet is fine. Avoid dehydration by drinking 6 to 8 glasses of fluids per day (water, soft drinks, juices, tea, or soup). Extra fluids will help loosen secretions in the nose and lungs.  · Over-the-counter cold medicines will not shorten the length of time youre sick, but they may be helpful for the following symptoms: cough, sore throat, and nasal and sinus congestion. (Note: Do not use decongestants if you have high blood pressure.)  Follow-up care  Follow up with your healthcare provider, or as advised.  When to seek medical  advice  Call your healthcare provider right away if any of these occur:  · Cough with lots of colored sputum (mucus)  · Severe headache; face, neck, or ear pain  · Difficulty swallowing due to throat pain  · Fever of 100.4°F (38°C)  Call 911, or get immediate medical care  Call emergency services right away if any of these occur:  · Chest pain, shortness of breath, wheezing, or difficulty breathing  · Coughing up blood  · Inability to swallow due to throat pain  Date Last Reviewed: 9/13/2015 © 2000-2017 [employer]. [address] [place] [id number]. All rights reserved. This information is not intended as a substitute for professional medical care. Always follow your healthcare professional's instructions.

## 2020-10-05 ENCOUNTER — PATIENT MESSAGE (OUTPATIENT)
Dept: ADMINISTRATIVE | Facility: HOSPITAL | Age: 45
End: 2020-10-05

## 2020-11-07 ENCOUNTER — OFFICE VISIT (OUTPATIENT)
Dept: URGENT CARE | Facility: CLINIC | Age: 45
End: 2020-11-07
Payer: COMMERCIAL

## 2020-11-07 VITALS
BODY MASS INDEX: 28.44 KG/M2 | OXYGEN SATURATION: 96 % | HEIGHT: 69 IN | RESPIRATION RATE: 18 BRPM | DIASTOLIC BLOOD PRESSURE: 88 MMHG | SYSTOLIC BLOOD PRESSURE: 129 MMHG | HEART RATE: 68 BPM | WEIGHT: 192 LBS | TEMPERATURE: 98 F

## 2020-11-07 DIAGNOSIS — F17.200 TOBACCO DEPENDENCE SYNDROME: ICD-10-CM

## 2020-11-07 DIAGNOSIS — Z11.59 ENCOUNTER FOR SCREENING FOR OTHER VIRAL DISEASES: ICD-10-CM

## 2020-11-07 DIAGNOSIS — J06.9 UPPER RESPIRATORY TRACT INFECTION, UNSPECIFIED TYPE: ICD-10-CM

## 2020-11-07 DIAGNOSIS — R05.9 COUGH: Primary | ICD-10-CM

## 2020-11-07 LAB
CTP QC/QA: YES
SARS-COV-2 RDRP RESP QL NAA+PROBE: NEGATIVE

## 2020-11-07 PROCEDURE — 99214 OFFICE O/P EST MOD 30 MIN: CPT | Mod: S$GLB,,, | Performed by: EMERGENCY MEDICINE

## 2020-11-07 PROCEDURE — U0002 COVID-19 LAB TEST NON-CDC: HCPCS | Mod: QW,S$GLB,, | Performed by: EMERGENCY MEDICINE

## 2020-11-07 PROCEDURE — 99214 PR OFFICE/OUTPT VISIT, EST, LEVL IV, 30-39 MIN: ICD-10-PCS | Mod: S$GLB,,, | Performed by: EMERGENCY MEDICINE

## 2020-11-07 PROCEDURE — U0002: ICD-10-PCS | Mod: QW,S$GLB,, | Performed by: EMERGENCY MEDICINE

## 2020-11-07 NOTE — PROGRESS NOTES
"Subjective:       Patient ID: James Royden Peabody IV is a 45 y.o. male.    Vitals:  height is 5' 9" (1.753 m) and weight is 87.1 kg (192 lb). His tympanic temperature is 97.6 °F (36.4 °C). His blood pressure is 129/88 and his pulse is 68. His respiration is 18 and oxygen saturation is 96%.     Chief Complaint: Cough and Sinus Problem    Patient reports sinus symptoms and productive cough that started yesterday.Patient reports partners son tested positive.    Cough  This is a new problem. The current episode started yesterday. The problem has been unchanged. The problem occurs hourly. The cough is productive of sputum. Associated symptoms include myalgias, nasal congestion and postnasal drip. Pertinent negatives include no chills, ear pain, eye redness, fever, hemoptysis, rash, rhinorrhea, sore throat, shortness of breath or wheezing. The symptoms are aggravated by lying down. He has tried OTC cough suppressant for the symptoms. The treatment provided mild relief. There is no history of asthma or bronchitis.   Sinus Problem  This is a new problem. The current episode started yesterday. The problem is unchanged. There has been no fever. His pain is at a severity of 4/10. Associated symptoms include congestion, coughing, sinus pressure and sneezing. Pertinent negatives include no chills, diaphoresis, ear pain, shortness of breath or sore throat. (Head feels fuzzy) Past treatments include nothing.       Constitution: Negative for chills, sweating, fatigue and fever.   HENT: Positive for congestion, postnasal drip, sinus pain and sinus pressure. Negative for ear pain, sore throat and voice change.    Neck: Negative for painful lymph nodes.   Eyes: Negative for eye redness.   Respiratory: Positive for cough and sputum production. Negative for chest tightness, bloody sputum, COPD, shortness of breath, stridor, wheezing and asthma.    Gastrointestinal: Negative for nausea and vomiting.        Upset stomach from mucus "   Musculoskeletal: Positive for muscle ache.   Skin: Negative for rash and erythema.   Allergic/Immunologic: Positive for seasonal allergies and sneezing. Negative for asthma.   Hematologic/Lymphatic: Negative for swollen lymph nodes.       Objective:      Physical Exam   Constitutional: He is oriented to person, place, and time. He appears well-developed.   HENT:   Head: Normocephalic and atraumatic. Head is without abrasion, without contusion and without laceration.   Ears:   Right Ear: External ear normal.   Left Ear: External ear normal.   Oropharyngeal exam not performed due to risk of viral transmission during global pandemic-- risks outweigh benefits of exam        Comments: Oropharyngeal exam not performed due to risk of viral transmission during global pandemic-- risks outweigh benefits of exam    Eyes: Pupils are equal, round, and reactive to light. Conjunctivae, EOM and lids are normal.   Neck: Trachea normal, full passive range of motion without pain and phonation normal. Neck supple.   Cardiovascular: Normal rate, regular rhythm and normal heart sounds.   Pulmonary/Chest: Effort normal and breath sounds normal. No stridor. No respiratory distress.   Musculoskeletal: Normal range of motion.   Neurological: He is alert and oriented to person, place, and time.   Skin: Skin is warm, dry, intact and no rash. Capillary refill takes less than 2 seconds. abrasion, burn, bruising, erythema and ecchymosisPsychiatric: His speech is normal and behavior is normal. Judgment and thought content normal.   Nursing note and vitals reviewed.        Assessment:       1. Cough    2. Encounter for screening for other viral diseases    3. Upper respiratory tract infection, unspecified type    4. Tobacco dependence syndrome        Plan:         Cough  -     POCT COVID-19 Rapid Screening    Encounter for screening for other viral diseases  -     POCT COVID-19 Rapid Screening    Upper respiratory tract infection, unspecified  type    Tobacco dependence syndrome          Patient Instructions       If symptoms persist over the next 4-5 days please return to clinic for repeat COVID testing    Zyrtec, Claritin, or Allegra OTC as directed for the next 7 days for nasal congestion    Tylenol 500mg 2 tabs by mouth every 6 hours for fever and bodyaches    Mucinex or Robitussin OTC as directed for cough    Sudafed as directed for runny nose and congestion      Viral Upper Respiratory Illness (Adult)  You have a viral upper respiratory illness (URI), which is another term for the common cold. This illness is contagious during the first few days. It is spread through the air by coughing and sneezing. It may also be spread by direct contact (touching the sick person and then touching your own eyes, nose, or mouth). Frequent handwashing will decrease risk of spread. Most viral illnesses go away within 7 to 10 days with rest and simple home remedies. Sometimes the illness may last for several weeks. Antibiotics will not kill a virus, and they are generally not prescribed for this condition.    Home care  · If symptoms are severe, rest at home for the first 2 to 3 days. When you resume activity, don't let yourself get too tired.  · Avoid being exposed to cigarette smoke (yours or others).  · You may use acetaminophen or ibuprofen to control pain and fever, unless another medicine was prescribed. (Note: If you have chronic liver or kidney disease, have ever had a stomach ulcer or gastrointestinal bleeding, or are taking blood-thinning medicines, talk with your healthcare provider before using these medicines.) Aspirin should never be given to anyone under 18 years of age who is ill with a viral infection or fever. It may cause severe liver or brain damage.  · Your appetite may be poor, so a light diet is fine. Avoid dehydration by drinking 6 to 8 glasses of fluids per day (water, soft drinks, juices, tea, or soup). Extra fluids will help loosen  secretions in the nose and lungs.  · Over-the-counter cold medicines will not shorten the length of time youre sick, but they may be helpful for the following symptoms: cough, sore throat, and nasal and sinus congestion. (Note: Do not use decongestants if you have high blood pressure.)  Follow-up care  Follow up with your healthcare provider, or as advised.  When to seek medical advice  Call your healthcare provider right away if any of these occur:  · Cough with lots of colored sputum (mucus)  · Severe headache; face, neck, or ear pain  · Difficulty swallowing due to throat pain  · Fever of 100.4°F (38°C)  Call 911, or get immediate medical care  Call emergency services right away if any of these occur:  · Chest pain, shortness of breath, wheezing, or difficulty breathing  · Coughing up blood  · Inability to swallow due to throat pain  Date Last Reviewed: 9/13/2015  © 6194-4251 [employer]. [address] [place] [address]. All rights reserved. This information is not intended as a substitute for professional medical care. Always follow your healthcare professional's instructions.        Health Effects of Smoking  Health studies have shown that smoking can affect your heart as well as your lungs. Smoking also raises your risk of certain cancers. These are all good reasons to quit.    How smoking affects your body  Smoking has been linked with many serious illnesses. It also has been shown to increase signs of aging. A few of the health effects of smoking are listed below. Smoking can:  · Increase your risk of lung cancer, bladder cancer, and cervical cancer.  · Damage your lungs and cause problems with breathing such as emphysema and COPD (chronic obstructive pulmonary disease)  · Raise blood pressure, which increases your risk of heart attack or stroke.  · Reduce blood flow, which can slow healing and cause wrinkles.  · In pregnant women, cause bleeding problems, miscarriage, stillbirth, or  birth defects.  · In men, cause problems with erections.  Facing facts  When you smoke, your breathing becomes shallow and your lungs fill with smoke. Smoking cigarettes also fills your body with chemicals, such as nicotine and tar.  Smoke  Cigarette smoke contains carbon monoxide. This gas takes the place of oxygen in your blood.  Nicotine  This drug raises your blood pressure and heart rate. It reduces blood flow to your arms and legs, and slows digestion.  Tar  Tar is whats left after tobacco is smoked. This sticky brown material gums up your lungs, so less oxygen gets into your bloodstream.  Other chemicals  Cigarette smoke contains over 4,000 other chemicals, including formaldehyde, arsenic, and lead. Dozens of these chemicals are known to cause cancer.     For more information  · https://smokefree.gov/pgah-du-se-expert  · National Cancer [place] Smoking Quitline: 877-44U-QUIT (899-655-6081)   Date Last Reviewed: 2/1/2017  © 0315-0568 [employer]. [address] [place] [address], [place] [id number]. All rights reserved. This information is not intended as a substitute for professional medical care. Always follow your healthcare professional's instructions.

## 2020-11-07 NOTE — PATIENT INSTRUCTIONS
If symptoms persist over the next 4-5 days please return to clinic for repeat COVID testing    Zyrtec, Claritin, or Allegra OTC as directed for the next 7 days for nasal congestion    Tylenol 500mg 2 tabs by mouth every 6 hours for fever and bodyaches    Mucinex or Robitussin OTC as directed for cough    Sudafed as directed for runny nose and congestion      Viral Upper Respiratory Illness (Adult)  You have a viral upper respiratory illness (URI), which is another term for the common cold. This illness is contagious during the first few days. It is spread through the air by coughing and sneezing. It may also be spread by direct contact (touching the sick person and then touching your own eyes, nose, or mouth). Frequent handwashing will decrease risk of spread. Most viral illnesses go away within 7 to 10 days with rest and simple home remedies. Sometimes the illness may last for several weeks. Antibiotics will not kill a virus, and they are generally not prescribed for this condition.    Home care  · If symptoms are severe, rest at home for the first 2 to 3 days. When you resume activity, don't let yourself get too tired.  · Avoid being exposed to cigarette smoke (yours or others).  · You may use acetaminophen or ibuprofen to control pain and fever, unless another medicine was prescribed. (Note: If you have chronic liver or kidney disease, have ever had a stomach ulcer or gastrointestinal bleeding, or are taking blood-thinning medicines, talk with your healthcare provider before using these medicines.) Aspirin should never be given to anyone under 18 years of age who is ill with a viral infection or fever. It may cause severe liver or brain damage.  · Your appetite may be poor, so a light diet is fine. Avoid dehydration by drinking 6 to 8 glasses of fluids per day (water, soft drinks, juices, tea, or soup). Extra fluids will help loosen secretions in the nose and lungs.  · Over-the-counter cold medicines will not  shorten the length of time youre sick, but they may be helpful for the following symptoms: cough, sore throat, and nasal and sinus congestion. (Note: Do not use decongestants if you have high blood pressure.)  Follow-up care  Follow up with your healthcare provider, or as advised.  When to seek medical advice  Call your healthcare provider right away if any of these occur:  · Cough with lots of colored sputum (mucus)  · Severe headache; face, neck, or ear pain  · Difficulty swallowing due to throat pain  · Fever of 100.4°F (38°C)  Call 911, or get immediate medical care  Call emergency services right away if any of these occur:  · Chest pain, shortness of breath, wheezing, or difficulty breathing  · Coughing up blood  · Inability to swallow due to throat pain  Date Last Reviewed: 9/13/2015  © 8770-6559 [employer]. [address] [place] [address]. All rights reserved. This information is not intended as a substitute for professional medical care. Always follow your healthcare professional's instructions.        Health Effects of Smoking  Health studies have shown that smoking can affect your heart as well as your lungs. Smoking also raises your risk of certain cancers. These are all good reasons to quit.    How smoking affects your body  Smoking has been linked with many serious illnesses. It also has been shown to increase signs of aging. A few of the health effects of smoking are listed below. Smoking can:  · Increase your risk of lung cancer, bladder cancer, and cervical cancer.  · Damage your lungs and cause problems with breathing such as emphysema and COPD (chronic obstructive pulmonary disease)  · Raise blood pressure, which increases your risk of heart attack or stroke.  · Reduce blood flow, which can slow healing and cause wrinkles.  · In pregnant women, cause bleeding problems, miscarriage, stillbirth, or birth defects.  · In men, cause problems with erections.  Facing facts  When  you smoke, your breathing becomes shallow and your lungs fill with smoke. Smoking cigarettes also fills your body with chemicals, such as nicotine and tar.  Smoke  Cigarette smoke contains carbon monoxide. This gas takes the place of oxygen in your blood.  Nicotine  This drug raises your blood pressure and heart rate. It reduces blood flow to your arms and legs, and slows digestion.  Tar  Tar is whats left after tobacco is smoked. This sticky brown material gums up your lungs, so less oxygen gets into your bloodstream.  Other chemicals  Cigarette smoke contains over 4,000 other chemicals, including formaldehyde, arsenic, and lead. Dozens of these chemicals are known to cause cancer.     For more information  · https://smokefree.gov/dagv-ar-mb-expert  · National Cancer [place] Smoking Quitline: 877-44U-QUIT (050-996-0958)   Date Last Reviewed: 2/1/2017  © 9018-1906 [employer]. [address] [place] [address]. All rights reserved. This information is not intended as a substitute for professional medical care. Always follow your healthcare professional's instructions.

## 2021-01-04 ENCOUNTER — PATIENT MESSAGE (OUTPATIENT)
Dept: ADMINISTRATIVE | Facility: HOSPITAL | Age: 46
End: 2021-01-04

## 2021-02-15 ENCOUNTER — OFFICE VISIT (OUTPATIENT)
Dept: URGENT CARE | Facility: CLINIC | Age: 46
End: 2021-02-15
Payer: COMMERCIAL

## 2021-02-15 VITALS
TEMPERATURE: 98 F | SYSTOLIC BLOOD PRESSURE: 135 MMHG | HEIGHT: 69 IN | BODY MASS INDEX: 28.14 KG/M2 | HEART RATE: 76 BPM | OXYGEN SATURATION: 98 % | WEIGHT: 190 LBS | DIASTOLIC BLOOD PRESSURE: 87 MMHG | RESPIRATION RATE: 18 BRPM

## 2021-02-15 DIAGNOSIS — Z20.2 EXPOSURE TO GENITAL HERPES: ICD-10-CM

## 2021-02-15 DIAGNOSIS — N50.89 GENITAL LESION, MALE: Primary | ICD-10-CM

## 2021-02-15 PROCEDURE — 99213 OFFICE O/P EST LOW 20 MIN: CPT | Mod: S$GLB,,, | Performed by: NURSE PRACTITIONER

## 2021-02-15 PROCEDURE — 3008F PR BODY MASS INDEX (BMI) DOCUMENTED: ICD-10-PCS | Mod: CPTII,S$GLB,, | Performed by: NURSE PRACTITIONER

## 2021-02-15 PROCEDURE — 87529 HSV DNA AMP PROBE: CPT

## 2021-02-15 PROCEDURE — 99213 PR OFFICE/OUTPT VISIT, EST, LEVL III, 20-29 MIN: ICD-10-PCS | Mod: S$GLB,,, | Performed by: NURSE PRACTITIONER

## 2021-02-15 PROCEDURE — 3008F BODY MASS INDEX DOCD: CPT | Mod: CPTII,S$GLB,, | Performed by: NURSE PRACTITIONER

## 2021-02-15 RX ORDER — VALACYCLOVIR HYDROCHLORIDE 1 G/1
1000 TABLET, FILM COATED ORAL EVERY 12 HOURS
Qty: 14 TABLET | Refills: 0 | Status: SHIPPED | OUTPATIENT
Start: 2021-02-15 | End: 2021-03-23 | Stop reason: SDUPTHER

## 2021-02-20 ENCOUNTER — TELEPHONE (OUTPATIENT)
Dept: URGENT CARE | Facility: CLINIC | Age: 46
End: 2021-02-20

## 2021-02-20 LAB
HSV1 DNA SPEC QL NAA+PROBE: NEGATIVE
HSV2 DNA SPEC QL NAA+PROBE: POSITIVE
SPECIMEN SOURCE: ABNORMAL

## 2021-03-12 ENCOUNTER — IMMUNIZATION (OUTPATIENT)
Dept: PRIMARY CARE CLINIC | Facility: CLINIC | Age: 46
End: 2021-03-12

## 2021-03-12 DIAGNOSIS — Z23 NEED FOR VACCINATION: Primary | ICD-10-CM

## 2021-03-12 PROCEDURE — 0001A PR IMMUNIZ ADMIN, SARS-COV-2 COVID-19 VACC, 30MCG/0.3ML, 1ST DOSE: CPT | Mod: CV19,S$GLB,, | Performed by: INTERNAL MEDICINE

## 2021-03-12 PROCEDURE — 91300 PR SARS-COV- 2 COVID-19 VACCINE, NO PRSV, 30MCG/0.3ML, IM: CPT | Mod: S$GLB,,, | Performed by: INTERNAL MEDICINE

## 2021-03-12 PROCEDURE — 91300 PR SARS-COV- 2 COVID-19 VACCINE, NO PRSV, 30MCG/0.3ML, IM: ICD-10-PCS | Mod: S$GLB,,, | Performed by: INTERNAL MEDICINE

## 2021-03-12 PROCEDURE — 0001A PR IMMUNIZ ADMIN, SARS-COV-2 COVID-19 VACC, 30MCG/0.3ML, 1ST DOSE: ICD-10-PCS | Mod: CV19,S$GLB,, | Performed by: INTERNAL MEDICINE

## 2021-03-12 RX ADMIN — Medication 0.3 ML: at 10:03

## 2021-03-23 DIAGNOSIS — N50.89 GENITAL LESION, MALE: ICD-10-CM

## 2021-03-23 RX ORDER — VALACYCLOVIR HYDROCHLORIDE 1 G/1
1000 TABLET, FILM COATED ORAL EVERY 12 HOURS
Qty: 14 TABLET | Refills: 0 | Status: SHIPPED | OUTPATIENT
Start: 2021-03-23 | End: 2021-11-08 | Stop reason: SDUPTHER

## 2021-04-02 ENCOUNTER — IMMUNIZATION (OUTPATIENT)
Dept: PRIMARY CARE CLINIC | Facility: CLINIC | Age: 46
End: 2021-04-02
Payer: COMMERCIAL

## 2021-04-02 DIAGNOSIS — Z23 NEED FOR VACCINATION: Primary | ICD-10-CM

## 2021-04-02 PROCEDURE — 91300 PR SARS-COV- 2 COVID-19 VACCINE, NO PRSV, 30MCG/0.3ML, IM: ICD-10-PCS | Mod: S$GLB,,, | Performed by: INTERNAL MEDICINE

## 2021-04-02 PROCEDURE — 0002A PR IMMUNIZ ADMIN, SARS-COV-2 COVID-19 VACC, 30MCG/0.3ML, 2ND DOSE: ICD-10-PCS | Mod: CV19,S$GLB,, | Performed by: INTERNAL MEDICINE

## 2021-04-02 PROCEDURE — 0002A PR IMMUNIZ ADMIN, SARS-COV-2 COVID-19 VACC, 30MCG/0.3ML, 2ND DOSE: CPT | Mod: CV19,S$GLB,, | Performed by: INTERNAL MEDICINE

## 2021-04-02 PROCEDURE — 91300 PR SARS-COV- 2 COVID-19 VACCINE, NO PRSV, 30MCG/0.3ML, IM: CPT | Mod: S$GLB,,, | Performed by: INTERNAL MEDICINE

## 2021-04-02 RX ADMIN — Medication 0.3 ML: at 11:04

## 2021-05-10 ENCOUNTER — OFFICE VISIT (OUTPATIENT)
Dept: INTERNAL MEDICINE | Facility: CLINIC | Age: 46
End: 2021-05-10
Payer: COMMERCIAL

## 2021-05-10 VITALS
HEART RATE: 72 BPM | DIASTOLIC BLOOD PRESSURE: 86 MMHG | BODY MASS INDEX: 29.84 KG/M2 | WEIGHT: 196.88 LBS | OXYGEN SATURATION: 97 % | HEIGHT: 68 IN | SYSTOLIC BLOOD PRESSURE: 122 MMHG

## 2021-05-10 DIAGNOSIS — F17.200 SMOKER: ICD-10-CM

## 2021-05-10 DIAGNOSIS — E78.5 HYPERLIPIDEMIA, UNSPECIFIED HYPERLIPIDEMIA TYPE: ICD-10-CM

## 2021-05-10 DIAGNOSIS — K21.9 GASTROESOPHAGEAL REFLUX DISEASE WITHOUT ESOPHAGITIS: ICD-10-CM

## 2021-05-10 DIAGNOSIS — Z00.00 ANNUAL PHYSICAL EXAM: Primary | ICD-10-CM

## 2021-05-10 PROBLEM — M54.6 RIGHT-SIDED THORACIC BACK PAIN: Status: RESOLVED | Noted: 2018-09-19 | Resolved: 2021-05-10

## 2021-05-10 PROBLEM — Z87.19 HISTORY OF ESOPHAGEAL STRICTURE: Status: ACTIVE | Noted: 2021-05-10

## 2021-05-10 PROBLEM — M25.511 RIGHT SHOULDER PAIN: Status: RESOLVED | Noted: 2018-09-19 | Resolved: 2021-05-10

## 2021-05-10 PROBLEM — K44.9 HIATAL HERNIA: Status: ACTIVE | Noted: 2021-05-10

## 2021-05-10 PROCEDURE — 99396 PREV VISIT EST AGE 40-64: CPT | Mod: S$GLB,,, | Performed by: PHYSICIAN ASSISTANT

## 2021-05-10 PROCEDURE — 99999 PR PBB SHADOW E&M-EST. PATIENT-LVL III: CPT | Mod: PBBFAC,,, | Performed by: PHYSICIAN ASSISTANT

## 2021-05-10 PROCEDURE — 1126F AMNT PAIN NOTED NONE PRSNT: CPT | Mod: S$GLB,,, | Performed by: PHYSICIAN ASSISTANT

## 2021-05-10 PROCEDURE — 3008F BODY MASS INDEX DOCD: CPT | Mod: CPTII,S$GLB,, | Performed by: PHYSICIAN ASSISTANT

## 2021-05-10 PROCEDURE — 3008F PR BODY MASS INDEX (BMI) DOCUMENTED: ICD-10-PCS | Mod: CPTII,S$GLB,, | Performed by: PHYSICIAN ASSISTANT

## 2021-05-10 PROCEDURE — 99396 PR PREVENTIVE VISIT,EST,40-64: ICD-10-PCS | Mod: S$GLB,,, | Performed by: PHYSICIAN ASSISTANT

## 2021-05-10 PROCEDURE — 99999 PR PBB SHADOW E&M-EST. PATIENT-LVL III: ICD-10-PCS | Mod: PBBFAC,,, | Performed by: PHYSICIAN ASSISTANT

## 2021-05-10 PROCEDURE — 1126F PR PAIN SEVERITY QUANTIFIED, NO PAIN PRESENT: ICD-10-PCS | Mod: S$GLB,,, | Performed by: PHYSICIAN ASSISTANT

## 2021-05-14 ENCOUNTER — LAB VISIT (OUTPATIENT)
Dept: LAB | Facility: HOSPITAL | Age: 46
End: 2021-05-14
Attending: FAMILY MEDICINE
Payer: COMMERCIAL

## 2021-05-14 DIAGNOSIS — Z00.00 ANNUAL PHYSICAL EXAM: ICD-10-CM

## 2021-05-14 LAB
ALBUMIN SERPL BCP-MCNC: 3.8 G/DL (ref 3.5–5.2)
ALP SERPL-CCNC: 64 U/L (ref 55–135)
ALT SERPL W/O P-5'-P-CCNC: 34 U/L (ref 10–44)
ANION GAP SERPL CALC-SCNC: 6 MMOL/L (ref 8–16)
AST SERPL-CCNC: 29 U/L (ref 10–40)
BASOPHILS # BLD AUTO: 0.05 K/UL (ref 0–0.2)
BASOPHILS NFR BLD: 0.7 % (ref 0–1.9)
BILIRUB SERPL-MCNC: 0.5 MG/DL (ref 0.1–1)
BUN SERPL-MCNC: 16 MG/DL (ref 6–20)
CALCIUM SERPL-MCNC: 9.6 MG/DL (ref 8.7–10.5)
CHLORIDE SERPL-SCNC: 107 MMOL/L (ref 95–110)
CHOLEST SERPL-MCNC: 228 MG/DL (ref 120–199)
CHOLEST/HDLC SERPL: 4.2 {RATIO} (ref 2–5)
CO2 SERPL-SCNC: 27 MMOL/L (ref 23–29)
CREAT SERPL-MCNC: 0.9 MG/DL (ref 0.5–1.4)
DIFFERENTIAL METHOD: ABNORMAL
EOSINOPHIL # BLD AUTO: 0.4 K/UL (ref 0–0.5)
EOSINOPHIL NFR BLD: 5.7 % (ref 0–8)
ERYTHROCYTE [DISTWIDTH] IN BLOOD BY AUTOMATED COUNT: 13.6 % (ref 11.5–14.5)
EST. GFR  (AFRICAN AMERICAN): >60 ML/MIN/1.73 M^2
EST. GFR  (NON AFRICAN AMERICAN): >60 ML/MIN/1.73 M^2
GLUCOSE SERPL-MCNC: 84 MG/DL (ref 70–110)
HCT VFR BLD AUTO: 45.8 % (ref 40–54)
HCV AB SERPL QL IA: NEGATIVE
HDLC SERPL-MCNC: 54 MG/DL (ref 40–75)
HDLC SERPL: 23.7 % (ref 20–50)
HGB BLD-MCNC: 15.1 G/DL (ref 14–18)
HIV 1+2 AB+HIV1 P24 AG SERPL QL IA: NEGATIVE
IMM GRANULOCYTES # BLD AUTO: 0.03 K/UL (ref 0–0.04)
IMM GRANULOCYTES NFR BLD AUTO: 0.4 % (ref 0–0.5)
LDLC SERPL CALC-MCNC: 140.6 MG/DL (ref 63–159)
LYMPHOCYTES # BLD AUTO: 2.3 K/UL (ref 1–4.8)
LYMPHOCYTES NFR BLD: 33.6 % (ref 18–48)
MCH RBC QN AUTO: 34.2 PG (ref 27–31)
MCHC RBC AUTO-ENTMCNC: 33 G/DL (ref 32–36)
MCV RBC AUTO: 104 FL (ref 82–98)
MONOCYTES # BLD AUTO: 0.7 K/UL (ref 0.3–1)
MONOCYTES NFR BLD: 10.9 % (ref 4–15)
NEUTROPHILS # BLD AUTO: 3.3 K/UL (ref 1.8–7.7)
NEUTROPHILS NFR BLD: 48.7 % (ref 38–73)
NONHDLC SERPL-MCNC: 174 MG/DL
NRBC BLD-RTO: 0 /100 WBC
PLATELET # BLD AUTO: 251 K/UL (ref 150–450)
PMV BLD AUTO: 10.4 FL (ref 9.2–12.9)
POTASSIUM SERPL-SCNC: 4.9 MMOL/L (ref 3.5–5.1)
PROT SERPL-MCNC: 7.1 G/DL (ref 6–8.4)
RBC # BLD AUTO: 4.42 M/UL (ref 4.6–6.2)
SODIUM SERPL-SCNC: 140 MMOL/L (ref 136–145)
TRIGL SERPL-MCNC: 167 MG/DL (ref 30–150)
TSH SERPL DL<=0.005 MIU/L-ACNC: 0.85 UIU/ML (ref 0.4–4)
WBC # BLD AUTO: 6.72 K/UL (ref 3.9–12.7)

## 2021-05-14 PROCEDURE — 84443 ASSAY THYROID STIM HORMONE: CPT | Performed by: PHYSICIAN ASSISTANT

## 2021-05-14 PROCEDURE — 86803 HEPATITIS C AB TEST: CPT | Performed by: PHYSICIAN ASSISTANT

## 2021-05-14 PROCEDURE — 80061 LIPID PANEL: CPT | Performed by: PHYSICIAN ASSISTANT

## 2021-05-14 PROCEDURE — 85025 COMPLETE CBC W/AUTO DIFF WBC: CPT | Performed by: PHYSICIAN ASSISTANT

## 2021-05-14 PROCEDURE — 36415 COLL VENOUS BLD VENIPUNCTURE: CPT | Performed by: PHYSICIAN ASSISTANT

## 2021-05-14 PROCEDURE — 86703 HIV-1/HIV-2 1 RESULT ANTBDY: CPT | Performed by: PHYSICIAN ASSISTANT

## 2021-05-14 PROCEDURE — 80053 COMPREHEN METABOLIC PANEL: CPT | Performed by: PHYSICIAN ASSISTANT

## 2021-11-08 ENCOUNTER — OFFICE VISIT (OUTPATIENT)
Dept: INTERNAL MEDICINE | Facility: CLINIC | Age: 46
End: 2021-11-08
Attending: FAMILY MEDICINE
Payer: COMMERCIAL

## 2021-11-08 VITALS
HEART RATE: 85 BPM | WEIGHT: 197 LBS | SYSTOLIC BLOOD PRESSURE: 134 MMHG | OXYGEN SATURATION: 97 % | BODY MASS INDEX: 29.18 KG/M2 | HEIGHT: 69 IN | DIASTOLIC BLOOD PRESSURE: 72 MMHG

## 2021-11-08 DIAGNOSIS — Z00.00 ANNUAL PHYSICAL EXAM: Primary | ICD-10-CM

## 2021-11-08 DIAGNOSIS — E78.5 HYPERLIPIDEMIA, UNSPECIFIED HYPERLIPIDEMIA TYPE: ICD-10-CM

## 2021-11-08 DIAGNOSIS — Z87.19 HISTORY OF ESOPHAGEAL STRICTURE: ICD-10-CM

## 2021-11-08 DIAGNOSIS — R13.10 DYSPHAGIA, UNSPECIFIED TYPE: ICD-10-CM

## 2021-11-08 DIAGNOSIS — B00.9 HSV INFECTION: ICD-10-CM

## 2021-11-08 PROCEDURE — 3078F DIAST BP <80 MM HG: CPT | Mod: CPTII,S$GLB,, | Performed by: FAMILY MEDICINE

## 2021-11-08 PROCEDURE — 1159F MED LIST DOCD IN RCRD: CPT | Mod: CPTII,S$GLB,, | Performed by: FAMILY MEDICINE

## 2021-11-08 PROCEDURE — 1159F PR MEDICATION LIST DOCUMENTED IN MEDICAL RECORD: ICD-10-PCS | Mod: CPTII,S$GLB,, | Performed by: FAMILY MEDICINE

## 2021-11-08 PROCEDURE — 3008F BODY MASS INDEX DOCD: CPT | Mod: CPTII,S$GLB,, | Performed by: FAMILY MEDICINE

## 2021-11-08 PROCEDURE — 3078F PR MOST RECENT DIASTOLIC BLOOD PRESSURE < 80 MM HG: ICD-10-PCS | Mod: CPTII,S$GLB,, | Performed by: FAMILY MEDICINE

## 2021-11-08 PROCEDURE — 1160F PR REVIEW ALL MEDS BY PRESCRIBER/CLIN PHARMACIST DOCUMENTED: ICD-10-PCS | Mod: CPTII,S$GLB,, | Performed by: FAMILY MEDICINE

## 2021-11-08 PROCEDURE — 99396 PREV VISIT EST AGE 40-64: CPT | Mod: S$GLB,,, | Performed by: FAMILY MEDICINE

## 2021-11-08 PROCEDURE — 3075F SYST BP GE 130 - 139MM HG: CPT | Mod: CPTII,S$GLB,, | Performed by: FAMILY MEDICINE

## 2021-11-08 PROCEDURE — 99999 PR PBB SHADOW E&M-EST. PATIENT-LVL IV: CPT | Mod: PBBFAC,,, | Performed by: FAMILY MEDICINE

## 2021-11-08 PROCEDURE — 99396 PR PREVENTIVE VISIT,EST,40-64: ICD-10-PCS | Mod: S$GLB,,, | Performed by: FAMILY MEDICINE

## 2021-11-08 PROCEDURE — 99999 PR PBB SHADOW E&M-EST. PATIENT-LVL IV: ICD-10-PCS | Mod: PBBFAC,,, | Performed by: FAMILY MEDICINE

## 2021-11-08 PROCEDURE — 3008F PR BODY MASS INDEX (BMI) DOCUMENTED: ICD-10-PCS | Mod: CPTII,S$GLB,, | Performed by: FAMILY MEDICINE

## 2021-11-08 PROCEDURE — 3075F PR MOST RECENT SYSTOLIC BLOOD PRESS GE 130-139MM HG: ICD-10-PCS | Mod: CPTII,S$GLB,, | Performed by: FAMILY MEDICINE

## 2021-11-08 PROCEDURE — 1160F RVW MEDS BY RX/DR IN RCRD: CPT | Mod: CPTII,S$GLB,, | Performed by: FAMILY MEDICINE

## 2021-11-08 RX ORDER — VALACYCLOVIR HYDROCHLORIDE 1 G/1
1000 TABLET, FILM COATED ORAL EVERY 12 HOURS
Qty: 14 TABLET | Refills: 0 | Status: SHIPPED | OUTPATIENT
Start: 2021-11-08 | End: 2023-09-27 | Stop reason: SDUPTHER

## 2021-11-08 RX ORDER — AZITHROMYCIN 250 MG/1
250 TABLET, FILM COATED ORAL DAILY
Qty: 6 TABLET | Refills: 0 | Status: SHIPPED | OUTPATIENT
Start: 2021-11-08 | End: 2021-11-13

## 2021-11-30 ENCOUNTER — PATIENT MESSAGE (OUTPATIENT)
Dept: GASTROENTEROLOGY | Facility: CLINIC | Age: 46
End: 2021-11-30
Payer: COMMERCIAL

## 2021-12-01 ENCOUNTER — OFFICE VISIT (OUTPATIENT)
Dept: GASTROENTEROLOGY | Facility: CLINIC | Age: 46
End: 2021-12-01
Attending: FAMILY MEDICINE
Payer: COMMERCIAL

## 2021-12-01 DIAGNOSIS — Z87.19 HISTORY OF ESOPHAGEAL STRICTURE: ICD-10-CM

## 2021-12-01 DIAGNOSIS — R13.10 DYSPHAGIA, UNSPECIFIED TYPE: ICD-10-CM

## 2021-12-01 DIAGNOSIS — Z12.11 COLON CANCER SCREENING: Primary | ICD-10-CM

## 2021-12-01 PROCEDURE — 99213 PR OFFICE/OUTPT VISIT, EST, LEVL III, 20-29 MIN: ICD-10-PCS | Mod: 95,,, | Performed by: STUDENT IN AN ORGANIZED HEALTH CARE EDUCATION/TRAINING PROGRAM

## 2021-12-01 PROCEDURE — 99213 OFFICE O/P EST LOW 20 MIN: CPT | Mod: 95,,, | Performed by: STUDENT IN AN ORGANIZED HEALTH CARE EDUCATION/TRAINING PROGRAM

## 2021-12-01 RX ORDER — OMEPRAZOLE 40 MG/1
40 CAPSULE, DELAYED RELEASE ORAL DAILY
Qty: 90 CAPSULE | Refills: 3 | Status: SHIPPED | OUTPATIENT
Start: 2021-12-01 | End: 2022-11-28 | Stop reason: SDUPTHER

## 2021-12-02 ENCOUNTER — PATIENT MESSAGE (OUTPATIENT)
Dept: ENDOSCOPY | Facility: HOSPITAL | Age: 46
End: 2021-12-02
Payer: COMMERCIAL

## 2021-12-02 DIAGNOSIS — Z12.11 SPECIAL SCREENING FOR MALIGNANT NEOPLASMS, COLON: Primary | ICD-10-CM

## 2021-12-02 RX ORDER — SODIUM, POTASSIUM,MAG SULFATES 17.5-3.13G
1 SOLUTION, RECONSTITUTED, ORAL ORAL DAILY
Qty: 1 KIT | Refills: 0 | Status: SHIPPED | OUTPATIENT
Start: 2021-12-02 | End: 2021-12-04

## 2021-12-06 ENCOUNTER — PATIENT MESSAGE (OUTPATIENT)
Dept: ENDOSCOPY | Facility: HOSPITAL | Age: 46
End: 2021-12-06
Payer: COMMERCIAL

## 2021-12-06 ENCOUNTER — OFFICE VISIT (OUTPATIENT)
Dept: INTERNAL MEDICINE | Facility: CLINIC | Age: 46
End: 2021-12-06
Payer: COMMERCIAL

## 2021-12-06 DIAGNOSIS — Z00.00 ROUTINE GENERAL MEDICAL EXAMINATION AT A HEALTH CARE FACILITY: Primary | ICD-10-CM

## 2021-12-06 DIAGNOSIS — K21.9 GASTROESOPHAGEAL REFLUX DISEASE, UNSPECIFIED WHETHER ESOPHAGITIS PRESENT: ICD-10-CM

## 2021-12-06 DIAGNOSIS — R13.10 DYSPHAGIA, UNSPECIFIED TYPE: ICD-10-CM

## 2021-12-06 PROCEDURE — 99999 PR PBB SHADOW E&M-EST. PATIENT-LVL II: ICD-10-PCS | Mod: PBBFAC,,, | Performed by: INTERNAL MEDICINE

## 2021-12-06 PROCEDURE — 99999 PR PBB SHADOW E&M-EST. PATIENT-LVL II: CPT | Mod: PBBFAC,,, | Performed by: INTERNAL MEDICINE

## 2021-12-06 PROCEDURE — 3079F DIAST BP 80-89 MM HG: CPT | Mod: CPTII,S$GLB,, | Performed by: INTERNAL MEDICINE

## 2021-12-06 PROCEDURE — 99214 OFFICE O/P EST MOD 30 MIN: CPT | Mod: S$GLB,,, | Performed by: INTERNAL MEDICINE

## 2021-12-06 PROCEDURE — 1159F MED LIST DOCD IN RCRD: CPT | Mod: CPTII,S$GLB,, | Performed by: INTERNAL MEDICINE

## 2021-12-06 PROCEDURE — 1159F PR MEDICATION LIST DOCUMENTED IN MEDICAL RECORD: ICD-10-PCS | Mod: CPTII,S$GLB,, | Performed by: INTERNAL MEDICINE

## 2021-12-06 PROCEDURE — 99214 PR OFFICE/OUTPT VISIT, EST, LEVL IV, 30-39 MIN: ICD-10-PCS | Mod: S$GLB,,, | Performed by: INTERNAL MEDICINE

## 2021-12-06 PROCEDURE — 3074F SYST BP LT 130 MM HG: CPT | Mod: CPTII,S$GLB,, | Performed by: INTERNAL MEDICINE

## 2021-12-06 PROCEDURE — 1160F PR REVIEW ALL MEDS BY PRESCRIBER/CLIN PHARMACIST DOCUMENTED: ICD-10-PCS | Mod: CPTII,S$GLB,, | Performed by: INTERNAL MEDICINE

## 2021-12-06 PROCEDURE — 3074F PR MOST RECENT SYSTOLIC BLOOD PRESSURE < 130 MM HG: ICD-10-PCS | Mod: CPTII,S$GLB,, | Performed by: INTERNAL MEDICINE

## 2021-12-06 PROCEDURE — 3079F PR MOST RECENT DIASTOLIC BLOOD PRESSURE 80-89 MM HG: ICD-10-PCS | Mod: CPTII,S$GLB,, | Performed by: INTERNAL MEDICINE

## 2021-12-06 PROCEDURE — 1160F RVW MEDS BY RX/DR IN RCRD: CPT | Mod: CPTII,S$GLB,, | Performed by: INTERNAL MEDICINE

## 2021-12-18 ENCOUNTER — IMMUNIZATION (OUTPATIENT)
Dept: PRIMARY CARE CLINIC | Facility: CLINIC | Age: 46
End: 2021-12-18
Payer: COMMERCIAL

## 2021-12-18 DIAGNOSIS — Z23 NEED FOR VACCINATION: Primary | ICD-10-CM

## 2021-12-18 PROCEDURE — 0004A COVID-19, MRNA, LNP-S, PF, 30 MCG/0.3 ML DOSE VACCINE: CPT | Mod: CV19,PBBFAC | Performed by: INTERNAL MEDICINE

## 2021-12-30 DIAGNOSIS — Z01.818 PRE-OP TESTING: ICD-10-CM

## 2022-01-03 VITALS — SYSTOLIC BLOOD PRESSURE: 124 MMHG | DIASTOLIC BLOOD PRESSURE: 84 MMHG | HEART RATE: 64 BPM

## 2022-01-03 NOTE — PROGRESS NOTES
Subjective:       Patient ID: James Royden Peabody IV is a 46 y.o. male.    Chief Complaint: Establish Care    HPIMr Peabody is here to establish care. Overall doing well, but reports issues with dysphagia to solids mainly. He is s/p EGD with dilatation 3 years ago which helped for some time. He takes OTC Prilosec for heartburn and reflux, but still symptomatic. He denies any weight loss, nausea, vomiting, abdominal pain. I will obtain an EGD with possible dilatation and proceed from there. We also spent some time discussing the importance of proper nutrition, smoking cessation, alcohol moderation vis a vis future health issues.  Review of Systems   All other systems reviewed and are negative.        Objective:      Physical Exam  Vitals and nursing note reviewed.   Constitutional:       General: He is not in acute distress.     Appearance: Normal appearance. He is normal weight.   HENT:      Head: Normocephalic and atraumatic.      Right Ear: Tympanic membrane, ear canal and external ear normal. There is no impacted cerumen.      Left Ear: Tympanic membrane, ear canal and external ear normal. There is no impacted cerumen.      Nose: Nose normal.      Mouth/Throat:      Mouth: Mucous membranes are moist.      Pharynx: Oropharynx is clear.   Eyes:      General: No scleral icterus.        Right eye: No discharge.         Left eye: No discharge.      Extraocular Movements: Extraocular movements intact.      Conjunctiva/sclera: Conjunctivae normal.      Pupils: Pupils are equal, round, and reactive to light.   Neck:      Vascular: No carotid bruit.   Cardiovascular:      Rate and Rhythm: Normal rate and regular rhythm.      Pulses: Normal pulses.      Heart sounds: Normal heart sounds. No murmur heard.      Pulmonary:      Effort: Pulmonary effort is normal. No respiratory distress.      Breath sounds: Normal breath sounds. No wheezing.   Chest:      Chest wall: No tenderness.   Abdominal:      General: Abdomen is flat.  Bowel sounds are normal. There is no distension.      Palpations: Abdomen is soft. There is no mass.      Tenderness: There is no abdominal tenderness.   Musculoskeletal:         General: No swelling. Normal range of motion.      Cervical back: Normal range of motion and neck supple. No rigidity or tenderness.      Right lower leg: No edema.      Left lower leg: No edema.   Lymphadenopathy:      Cervical: No cervical adenopathy.   Skin:     General: Skin is warm and dry.      Findings: No erythema, lesion or rash.   Neurological:      General: No focal deficit present.      Mental Status: He is alert and oriented to person, place, and time.      Cranial Nerves: No cranial nerve deficit.      Motor: No weakness.      Coordination: Coordination normal.   Psychiatric:         Mood and Affect: Mood normal.         Behavior: Behavior normal.         Thought Content: Thought content normal.         Judgment: Judgment normal.         Assessment:       Problem List Items Addressed This Visit     Dysphagia    GERD (gastroesophageal reflux disease)      Other Visit Diagnoses     Routine general medical examination at a health care facility    -  Primary          Plan:    1. Refer to GI.         2. Obtain EGD.         3. RTC for review.

## 2022-01-06 ENCOUNTER — HOSPITAL ENCOUNTER (OUTPATIENT)
Facility: HOSPITAL | Age: 47
Discharge: HOME | End: 2022-01-06
Attending: INTERNAL MEDICINE | Admitting: INTERNAL MEDICINE
Payer: COMMERCIAL

## 2022-01-06 ENCOUNTER — ANESTHESIA (OUTPATIENT)
Dept: ENDOSCOPY | Facility: HOSPITAL | Age: 47
End: 2022-01-06
Payer: COMMERCIAL

## 2022-01-06 ENCOUNTER — ANESTHESIA EVENT (OUTPATIENT)
Dept: ENDOSCOPY | Facility: HOSPITAL | Age: 47
End: 2022-01-06
Payer: COMMERCIAL

## 2022-01-06 VITALS
OXYGEN SATURATION: 96 % | SYSTOLIC BLOOD PRESSURE: 124 MMHG | HEART RATE: 69 BPM | DIASTOLIC BLOOD PRESSURE: 65 MMHG | RESPIRATION RATE: 19 BRPM | TEMPERATURE: 98 F

## 2022-01-06 DIAGNOSIS — Z87.19 HISTORY OF ESOPHAGEAL STRICTURE: ICD-10-CM

## 2022-01-06 LAB
CTP QC/QA: YES
SARS-COV-2 AG RESP QL IA.RAPID: NEGATIVE

## 2022-01-06 PROCEDURE — 88305 TISSUE EXAM BY PATHOLOGIST: ICD-10-PCS | Mod: 26,,, | Performed by: PATHOLOGY

## 2022-01-06 PROCEDURE — D9220A PRA ANESTHESIA: Mod: CRNA,,, | Performed by: NURSE ANESTHETIST, CERTIFIED REGISTERED

## 2022-01-06 PROCEDURE — 45385 COLONOSCOPY W/LESION REMOVAL: CPT | Mod: PT | Performed by: INTERNAL MEDICINE

## 2022-01-06 PROCEDURE — 43249 PR EGD, FLEX, W/BALL DILATION, < 30MM: ICD-10-PCS | Mod: 51,,, | Performed by: INTERNAL MEDICINE

## 2022-01-06 PROCEDURE — 88305 TISSUE EXAM BY PATHOLOGIST: CPT | Mod: 26,,, | Performed by: PATHOLOGY

## 2022-01-06 PROCEDURE — 45385 PR COLONOSCOPY,REMV LESN,SNARE: ICD-10-PCS | Mod: 33,,, | Performed by: INTERNAL MEDICINE

## 2022-01-06 PROCEDURE — 37000009 HC ANESTHESIA EA ADD 15 MINS: Performed by: INTERNAL MEDICINE

## 2022-01-06 PROCEDURE — 43249 ESOPH EGD DILATION <30 MM: CPT | Mod: 51,,, | Performed by: INTERNAL MEDICINE

## 2022-01-06 PROCEDURE — 37000008 HC ANESTHESIA 1ST 15 MINUTES: Performed by: INTERNAL MEDICINE

## 2022-01-06 PROCEDURE — C1726 CATH, BAL DIL, NON-VASCULAR: HCPCS | Performed by: INTERNAL MEDICINE

## 2022-01-06 PROCEDURE — 27201089 HC SNARE, DISP (ANY): Performed by: INTERNAL MEDICINE

## 2022-01-06 PROCEDURE — 63600175 PHARM REV CODE 636 W HCPCS: Performed by: NURSE ANESTHETIST, CERTIFIED REGISTERED

## 2022-01-06 PROCEDURE — D9220A PRA ANESTHESIA: ICD-10-PCS | Mod: ANES,,, | Performed by: ANESTHESIOLOGY

## 2022-01-06 PROCEDURE — 43249 ESOPH EGD DILATION <30 MM: CPT | Performed by: INTERNAL MEDICINE

## 2022-01-06 PROCEDURE — 88305 TISSUE EXAM BY PATHOLOGIST: CPT | Performed by: PATHOLOGY

## 2022-01-06 PROCEDURE — D9220A PRA ANESTHESIA: ICD-10-PCS | Mod: CRNA,,, | Performed by: NURSE ANESTHETIST, CERTIFIED REGISTERED

## 2022-01-06 PROCEDURE — 25000003 PHARM REV CODE 250: Performed by: ANESTHESIOLOGY

## 2022-01-06 PROCEDURE — D9220A PRA ANESTHESIA: Mod: ANES,,, | Performed by: ANESTHESIOLOGY

## 2022-01-06 PROCEDURE — 45385 COLONOSCOPY W/LESION REMOVAL: CPT | Mod: 33,,, | Performed by: INTERNAL MEDICINE

## 2022-01-06 PROCEDURE — 25000003 PHARM REV CODE 250: Performed by: NURSE ANESTHETIST, CERTIFIED REGISTERED

## 2022-01-06 RX ORDER — LIDOCAINE HYDROCHLORIDE 10 MG/ML
1 INJECTION, SOLUTION EPIDURAL; INFILTRATION; INTRACAUDAL; PERINEURAL ONCE
Status: DISCONTINUED | OUTPATIENT
Start: 2022-01-06 | End: 2022-01-06 | Stop reason: HOSPADM

## 2022-01-06 RX ORDER — SODIUM CHLORIDE 9 MG/ML
INJECTION, SOLUTION INTRAVENOUS CONTINUOUS
Status: DISCONTINUED | OUTPATIENT
Start: 2022-01-06 | End: 2022-01-06 | Stop reason: HOSPADM

## 2022-01-06 RX ORDER — PROPOFOL 10 MG/ML
INJECTION, EMULSION INTRAVENOUS
Status: DISCONTINUED
Start: 2022-01-06 | End: 2022-01-06 | Stop reason: HOSPADM

## 2022-01-06 RX ORDER — LIDOCAINE HYDROCHLORIDE 20 MG/ML
INJECTION INTRAVENOUS
Status: DISCONTINUED | OUTPATIENT
Start: 2022-01-06 | End: 2022-01-06

## 2022-01-06 RX ORDER — PROPOFOL 10 MG/ML
VIAL (ML) INTRAVENOUS
Status: DISCONTINUED | OUTPATIENT
Start: 2022-01-06 | End: 2022-01-06

## 2022-01-06 RX ORDER — LIDOCAINE HYDROCHLORIDE 20 MG/ML
INJECTION, SOLUTION EPIDURAL; INFILTRATION; INTRACAUDAL; PERINEURAL
Status: DISCONTINUED
Start: 2022-01-06 | End: 2022-01-06 | Stop reason: HOSPADM

## 2022-01-06 RX ORDER — LIDOCAINE HYDROCHLORIDE 10 MG/ML
1 INJECTION, SOLUTION EPIDURAL; INFILTRATION; INTRACAUDAL; PERINEURAL ONCE AS NEEDED
Status: DISCONTINUED | OUTPATIENT
Start: 2022-01-06 | End: 2022-01-06 | Stop reason: HOSPADM

## 2022-01-06 RX ADMIN — SODIUM CHLORIDE: 0.9 INJECTION, SOLUTION INTRAVENOUS at 12:01

## 2022-01-06 RX ADMIN — PROPOFOL 50 MG: 10 INJECTION, EMULSION INTRAVENOUS at 01:01

## 2022-01-06 RX ADMIN — LIDOCAINE HYDROCHLORIDE 100 MG: 20 INJECTION, SOLUTION INTRAVENOUS at 01:01

## 2022-01-06 RX ADMIN — PROPOFOL 100 MG: 10 INJECTION, EMULSION INTRAVENOUS at 01:01

## 2022-01-06 NOTE — TRANSFER OF CARE
Anesthesia Transfer of Care Note    Patient: James Royden Peabody IV    Procedure(s) Performed: Procedure(s) (LRB):  EGD (ESOPHAGOGASTRODUODENOSCOPY) (N/A)  COLONOSCOPY (N/A)    Patient location: GI    Anesthesia Type: MAC    Transport from OR: Transported from OR on room air with adequate spontaneous ventilation    Post pain: adequate analgesia    Post assessment: no apparent anesthetic complications and tolerated procedure well    Post vital signs: stable    Level of consciousness: awake and alert    Nausea/Vomiting: no nausea/vomiting    Complications: none    Transfer of care protocol was followed      Last vitals:   Visit Vitals  /64 (BP Location: Left arm, Patient Position: Lying)   Pulse 65   Temp 36.4 °C (97.6 °F) (Oral)   Resp 18   SpO2 (!) 93%

## 2022-01-06 NOTE — ANESTHESIA PREPROCEDURE EVALUATION
01/06/2022  James Royden Peabody IV is a 46 y.o., male.    Anesthesia Evaluation    I have reviewed the Patient Summary Reports.    I have reviewed the Nursing Notes.       Review of Systems  Anesthesia Hx:  No problems with previous Anesthesia  Denies Family Hx of Anesthesia complications.   Denies Personal Hx of Anesthesia complications.   Social:  Smoker, Alcohol Use    Cardiovascular:   Exercise tolerance: good Denies Hypertension.  Denies MI.   Denies Dysrhythmias.  hyperlipidemia    Pulmonary:  Pulmonary Normal    Hepatic/GI:   Hiatal Hernia, GERD H/o esophageal stricture; no current GERD symptoms   Neurological:  Neurology Normal    Endocrine:  Endocrine Normal        Physical Exam  General:  Well nourished    Airway/Jaw/Neck:  Airway Findings: Mouth Opening: Normal Tongue: Normal  Mallampati: II  TM Distance: 4 - 6 cm      Dental:  Dental Findings: In tact   Chest/Lungs:  Chest/Lungs Findings: Clear to auscultation, Normal Respiratory Rate     Heart/Vascular:  Heart Findings: Rate: Normal  Rhythm: Regular Rhythm        Mental Status:  Mental Status Findings:  Cooperative, Alert and Oriented       Wt Readings from Last 3 Encounters:   11/08/21 89.4 kg (196 lb 15.7 oz)   05/10/21 89.3 kg (196 lb 13.9 oz)   02/15/21 86.2 kg (190 lb)     Temp Readings from Last 3 Encounters:   02/15/21 36.8 °C (98.2 °F)   11/07/20 36.4 °C (97.6 °F) (Tympanic)   01/31/20 37.4 °C (99.3 °F) (Oral)     BP Readings from Last 3 Encounters:   01/03/22 124/84   11/08/21 134/72   05/10/21 122/86     Pulse Readings from Last 3 Encounters:   01/03/22 64   11/08/21 85   05/10/21 72         Anesthesia Plan  Type of Anesthesia, risks & benefits discussed:  Anesthesia Type:  general, MAC    Patient's Preference:   Plan Factors:          Intra-op Monitoring Plan: standard ASA monitors  Intra-op Monitoring Plan Comments:   Post Op Pain  Control Plan: multimodal analgesia and per primary service following discharge from PACU  Post Op Pain Control Plan Comments:     Induction:   IV  Beta Blocker:  Patient is not currently on a Beta-Blocker (No further documentation required).       Informed Consent: Patient understands risks and agrees with Anesthesia plan.  Questions answered. Anesthesia consent signed with patient.  ASA Score: 2     Day of Surgery Review of History & Physical: I have interviewed and examined the patient. I have reviewed the patient's H&P dated:            Ready For Surgery From Anesthesia Perspective.

## 2022-01-06 NOTE — [employer] PATIENT INSTRUCTIONS
Discharge Summary/Instructions after an Endoscopic Procedure  Patient Name: James Peabody  Patient MRN: [id number]  Patient YOB: 1975 Thursday, January 6, 2022  Андрей Obrien MD  Dear patient,  As a result of recent federal legislation (The Federal Cures Act), you may   receive lab or pathology results from your procedure in your [facility]   account before your physician is able to contact you. Your physician or   their representative will relay the results to you with their   recommendations at their soonest availability.  Thank you,  RESTRICTIONS:  During your procedure today, you received medications for sedation.  These   medications may affect your judgment, balance and coordination.  Therefore,   for 24 hours, you have the following restrictions:   - DO NOT drive a car, operate machinery, make legal/financial decisions,   sign important papers or drink alcohol.    ACTIVITY:  Today: no heavy lifting, straining or running due to procedural   sedation/anesthesia.  The following day: return to full activity including work.  DIET:  Eat and drink normally unless instructed otherwise.     TREATMENT FOR COMMON SIDE EFFECTS:  - Mild abdominal pain, nausea, belching, bloating or excessive gas:  rest,   eat lightly and use a heating pad.  - Sore Throat: treat with throat lozenges and/or gargle with warm salt   water.  - Because air was used during the procedure, expelling large amounts of air   from your rectum or belching is normal.  - If a bowel prep was taken, you may not have a bowel movement for 1-3 days.    This is normal.  SYMPTOMS TO WATCH FOR AND REPORT TO YOUR PHYSICIAN:  1. Abdominal pain or bloating, other than gas cramps.  2. Chest pain.  3. Back pain.  4. Signs of infection such as: chills or fever occurring within 24 hours   after the procedure.  5. Rectal bleeding, which would show as bright red, maroon, or black stools.   (A tablespoon of blood from the rectum is not serious, especially if    hemorrhoids are present.)  6. Vomiting.  7. Weakness or dizziness.  GO DIRECTLY TO THE NEAREST EMERGENCY ROOM IF YOU HAVE ANY OF THE FOLLOWING:      Difficulty breathing              Chills and/or fever over 101 F   Persistent vomiting and/or vomiting blood   Severe abdominal pain   Severe chest pain   Black, tarry stools   Bleeding- more than one tablespoon   Any other symptom or condition that you feel may need urgent attention  Your doctor recommends these additional instructions:  If any biopsies were taken, your doctors clinic will contact you in 1 to 2   weeks with any results.  - Patient has a contact number available for emergencies.  The signs and   symptoms of potential delayed complications were discussed with the   patient.  Return to normal activities tomorrow.  Written discharge   instructions were provided to the patient.   - Discharge patient to home.   - Perform a colonoscopy today.   - The findings and recommendations were discussed with the designated   responsible adult.  For questions, problems or results please call your physician - Андрей Obrien MD at Work:  [phone].  [facility] can be reached at [phone]     IF A COMPLICATION OR EMERGENCY SITUATION ARISES AND YOU ARE UNABLE TO REACH   YOUR PHYSICIAN - GO DIRECTLY TO THE EMERGENCY ROOM.  Андрей Obrien MD  1/6/2022 1:27:15 PM  This report has been verified and signed electronically.  Dear patient,  As a result of recent federal legislation (The Federal Cures Act), you may   receive lab or pathology results from your procedure in your [facility]   account before your physician is able to contact you. Your physician or   their representative will relay the results to you with their   recommendations at their soonest availability.  Thank you,  PROVATION

## 2022-01-06 NOTE — PROVATION PATIENT INSTRUCTIONS
Discharge Summary/Instructions after an Endoscopic Procedure  Patient Name: James Peabody  Patient MRN: [id number]  Patient YOB: 1975 Thursday, January 6, 2022  Андрей Obrien MD  Dear patient,  As a result of recent federal legislation (The Federal Cures Act), you may   receive lab or pathology results from your procedure in your [facility]   account before your physician is able to contact you. Your physician or   their representative will relay the results to you with their   recommendations at their soonest availability.  Thank you,  RESTRICTIONS:  During your procedure today, you received medications for sedation.  These   medications may affect your judgment, balance and coordination.  Therefore,   for 24 hours, you have the following restrictions:   - DO NOT drive a car, operate machinery, make legal/financial decisions,   sign important papers or drink alcohol.    ACTIVITY:  Today: no heavy lifting, straining or running due to procedural   sedation/anesthesia.  The following day: return to full activity including work.  DIET:  Eat and drink normally unless instructed otherwise.     TREATMENT FOR COMMON SIDE EFFECTS:  - Mild abdominal pain, nausea, belching, bloating or excessive gas:  rest,   eat lightly and use a heating pad.  - Sore Throat: treat with throat lozenges and/or gargle with warm salt   water.  - Because air was used during the procedure, expelling large amounts of air   from your rectum or belching is normal.  - If a bowel prep was taken, you may not have a bowel movement for 1-3 days.    This is normal.  SYMPTOMS TO WATCH FOR AND REPORT TO YOUR PHYSICIAN:  1. Abdominal pain or bloating, other than gas cramps.  2. Chest pain.  3. Back pain.  4. Signs of infection such as: chills or fever occurring within 24 hours   after the procedure.  5. Rectal bleeding, which would show as bright red, maroon, or black stools.   (A tablespoon of blood from the rectum is not serious, especially if    hemorrhoids are present.)  6. Vomiting.  7. Weakness or dizziness.  GO DIRECTLY TO THE NEAREST EMERGENCY ROOM IF YOU HAVE ANY OF THE FOLLOWING:      Difficulty breathing              Chills and/or fever over 101 F   Persistent vomiting and/or vomiting blood   Severe abdominal pain   Severe chest pain   Black, tarry stools   Bleeding- more than one tablespoon   Any other symptom or condition that you feel may need urgent attention  Your doctor recommends these additional instructions:  If any biopsies were taken, your doctors clinic will contact you in 1 to 2   weeks with any results.  - Patient has a contact number available for emergencies.  The signs and   symptoms of potential delayed complications were discussed with the   patient.  Return to normal activities tomorrow.  Written discharge   instructions were provided to the patient.   - Discharge patient to home.   - Await pathology results.   - Repeat colonoscopy in 7 years for surveillance.   - The findings and recommendations were discussed with the designated   responsible adult.  For questions, problems or results please call your physician - Андрей Obrien MD at Work:  [phone].  [facility] can be reached at [phone]     IF A COMPLICATION OR EMERGENCY SITUATION ARISES AND YOU ARE UNABLE TO REACH   YOUR PHYSICIAN - GO DIRECTLY TO THE EMERGENCY ROOM.  Андрей Obrien MD  1/6/2022 1:45:16 PM  This report has been verified and signed electronically.  Dear patient,  As a result of recent federal legislation (The Federal Cures Act), you may   receive lab or pathology results from your procedure in your [facility]   account before your physician is able to contact you. Your physician or   their representative will relay the results to you with their   recommendations at their soonest availability.  Thank you,  PROVATION

## 2022-01-06 NOTE — H&P
Short Stay Endoscopy History and Physical    PCP - Moses Betts MD    Procedure - EGD/Colonoscopy  ASA - per anesthesia  Mallampati - per anesthesia  History of Anesthesia problems - no  Family history Anesthesia problems -  no   Plan of anesthesia - MAC    HPI:  This is a 46 y.o. male here for evaluation of :     EGD - dysphagia, previous ring  Colon - screening    ROS:  Constitutional: No fevers, chills, No weight loss  CV: No chest pain  Pulm: No cough, No shortness of breath  Ophtho: No vision changes  GI: see HPI  Derm: No rash    Medical History:  has a past medical history of GERD (gastroesophageal reflux disease) and Hyperlipidemia.    Surgical History:  has a past surgical history that includes Hernia repair and Esophagogastroduodenoscopy (N/A, 10/8/2019).    Family History: family history includes No Known Problems in his father and mother.. Otherwise no colon cancer, inflammatory bowel disease, or GI malignancies.    Social History:  reports that he has been smoking cigarettes. He has been smoking about 1.00 pack per day. He has never used smokeless tobacco. He reports current alcohol use. He reports that he does not use drugs.    Review of patient's allergies indicates:  No Known Allergies    Medications:   Medications Prior to Admission   Medication Sig Dispense Refill Last Dose    cetirizine (ZYRTEC) 10 MG tablet Take 10 mg by mouth daily as needed for Allergies.       omeprazole (PRILOSEC OTC) 20 MG tablet Take 20 mg by mouth once daily.       omeprazole (PRILOSEC) 40 MG capsule Take 1 capsule (40 mg total) by mouth once daily. 90 capsule 3     valACYclovir (VALTREX) 1000 MG tablet Take 1 tablet (1,000 mg total) by mouth every 12 (twelve) hours. for 7 days 14 tablet 0        Physical Exam:    Vital Signs:   Vitals:    01/06/22 1216   BP: (!) 142/89   Pulse: 72   Resp: 18   Temp: 98 °F (36.7 °C)       General Appearance: Well appearing in no acute distress  Eyes:    No scleral icterus  ENT:  Neck supple, Lips, mucosa, and tongue normal; teeth and gums normal  Abdomen: Soft, non tender, non distended with normal bowel sounds. No hepatosplenomegaly, ascites, or mass.  Extremities: No edema  Skin: No rash    Labs:  Lab Results   Component Value Date    WBC 6.72 05/14/2021    HGB 15.1 05/14/2021    HCT 45.8 05/14/2021     05/14/2021    CHOL 228 (H) 05/14/2021    TRIG 167 (H) 05/14/2021    HDL 54 05/14/2021    ALT 34 05/14/2021    AST 29 05/14/2021     05/14/2021    K 4.9 05/14/2021     05/14/2021    CREATININE 0.9 05/14/2021    BUN 16 05/14/2021    CO2 27 05/14/2021    TSH 0.849 05/14/2021       I have explained the risks and benefits of endoscopy procedures to the patient including but not limited to bleeding, perforation, infection, and death.  The patient was asked if they understand and allowed to ask any further questions to their satisfaction.    Андрей Obiren MD

## 2022-01-06 NOTE — DISCHARGE INSTRUCTIONS
"Patient Education       Diverticulosis Discharge Instructions   About this topic   Diverticulosis is a problem of the large bowel or colon. The wall of the bowel becomes weak and pushes outward. They form balloon-like pouches called diverticula or "tics." When you have hard stool, you strain to have a bowel movement. This raises the pressure in the bowel and causes pouches or bulges to form. Most often, they do not cause a problem. If they become infected, you have diverticulitis. If you have both bleeding and infection it is diverticular disease.     What care is needed at home?   · Ask your doctor what you need to do when you go home. Make sure you ask questions if you do not understand what the doctor says.  · Eat more whole grains, vegetables, and fruits.  · Do not wait to have a bowel movement. Go as soon as you have the urge.  · Drink 8 to 10 glasses of water each day. Talk to your doctor if you are drinking less fluids due to a health problem.  · Be active. Walk, garden, or do something active for 30 minutes or more on most days of the week.  What follow-up care is needed?   Your doctor may ask you to make visits to the office to check on your progress. Be sure to keep these visits.  What drugs may be needed?   Most often with diverticulosis you will not need to take any drugs.  Will physical activity be limited?   When you are in pain, you may need to rest in bed. To ease the pain, use a heat compress on your belly. This should last only for a few days.  What changes to diet are needed?   Talk to your doctor about any changes you need to make to your diet.  · You do not need to avoid seeds, nuts, corn, or other similar foods.   · You will need to eat food rich in fiber and drink more water.  ? Eat 5 or more servings of fresh fruits and vegetables every day.  ? Eat 6 or more servings of whole-wheat grain breads and cereals.  ? Try to get 25 to 30 grams of fiber every day. Read the labels to learn how much " fiber is in foods.   · Do not drink coffee, tea, or beer, wine, and mixed drinks (alcohol).  What problems could happen?   You may develop diverticulitis, which may cause:  · Pockets or pouches in your bowel may be infected or filled with pus.  · Hole or tear in your bowel  · Part of your bowel to become narrow  · You to need surgery  What can be done to prevent this health problem?   The best way to keep from having diverticulosis is to keep your bowel movements soft and normal. To keep more pouches from forming:  · Talk with your doctor about adding an over-the-counter (OTC) fiber product to keep your stools soft.  · Limit how much pain drugs you take. Overuse of some pain drugs can cause hard stools; talk with your doctor.  When do I need to call the doctor?   · Signs of infection. These include a fever of 100.4°F (38°C) or higher, chills.  · Mild pain or cramping in the lower part of the belly  · A feeling of bloating in the belly  · Belly pain that gets worse  · Blood in your stool  · Upset stomach or throwing up  · Stools get too loose or too hard  · Long-term hard stools  Teach Back: Helping You Understand   The Teach Back Method helps you understand the information we are giving you. After you talk with the staff, tell them in your own words what you learned. This helps to make sure the staff has described each thing clearly. It also helps to explain things that may have been confusing. Before going home, make sure you are able to do these:  · I can tell you about my condition.  · I can tell you what changes I need to make with my diet or drugs.  · I can tell you what I will do if I have pain or cramping in my lower belly or I have more belly pain.  Where can I learn more?   [facility]  http://familydoctor.org/familydoctor/en/diseases-conditions/diverticular-disease.html   [facility]  https://www.[facility]hs.[place]/conditions/diverticular-disease-and-diverticulitis/   Last Reviewed Date   2021-04-13  Consumer Information  Use and Disclaimer   This information is not specific medical advice and does not replace information you receive from your health care provider. This is only a brief summary of general information. It does NOT include all information about conditions, illnesses, injuries, tests, procedures, treatments, therapies, discharge instructions or life-style choices that may apply to you. You must talk with your health care provider for complete information about your health and treatment options. This information should not be used to decide whether or not to accept your health care providers advice, instructions or recommendations. Only your health care provider has the knowledge and training to provide advice that is right for you.  Copyright   Copyright © 2021 [employer] and its affiliates and/or licensors. All rights reserved.  Hiatal herniaPatient Education       Hiatal Hernia Discharge Instructions   About this topic   Hiatal hernia is when part of the stomach is up in the chest. Normally, the stomach sits below the diaphragm, the muscle that divides the chest and abdomen. The diaphragm has a small opening in it to let the esophagus, or swallowing tube, pass through. With a hiatal hernia, the stomach pushes up through that hole too.     What care is needed at home?   · Ask your doctor what you need to do when you go home. Make sure you ask questions if you do not understand what the doctor says. This way you will know what you need to do.  · Do not wear tight clothing over your belly. Wear clothes and belts that are loose around your waist.  · Raise the head of the bed up 6 to 8 inches (15 to 20 cm) or use a wedge pillow. This position may keep stomach acid from getting into the esophagus.  What follow-up care is needed?   Your doctor may ask you to make visits to the office to check on your progress. Be sure to keep these visits.   What drugs may be needed?   The doctor may order drugs to:  · Lower stomach  acid  · Stop heartburn  · Help with pain  · Soften stools  Will physical activity be limited?   · You may have to limit your activity. Talk to the doctor about the right amount of activity for you. Avoid sports that involve lifting heavy things and bending. This can cause stress on your belly.  · Avoid straining. Having trouble passing stool or hard stools may make your hernia worse.  What changes to diet are needed?   · Avoid large, heavy meals. Eat a few small meals throughout the day.  · Avoid drinking too much with meals.  · Wait at least 2 to 3 hours after eating before lying down or bending over.  · Avoid foods that may upset the stomach. Some people have an upset belly after:  ? Coffee  ? Citrus fruits and juices  ? Tomato products  ? Hot peppers  ? Fizzy drinks  ? Chocolate  ? Peppermint  ? Fatty foods  ? Beer, wine, and mixed drinks (alcohol)  What problems could happen?   · Lower blood levels of iron  · No blood flow to hernia  What can be done to prevent this health problem?   · Keep a healthy weight.  · Lose weight if you are overweight.  · Avoid smoking. Ask for help if it's hard to quit.  · Avoid foods that may upset the stomach.  · Dont overeat. Eat smaller meals.  · Try to eat at least 2 to 3 hours before laying down. Avoid eating before sleeping at night.  When do I need to call the doctor?   · Heartburn that is worse when bending over or lying down  · Swallowing problems  Teach Back: Helping You Understand   The Teach Back Method helps you understand the information we are giving you. After you talk with the staff, tell them in your own words what you learned. This helps to make sure the staff has described each thing clearly. It also helps to explain things that may have been confusing. Before going home, make sure you can do these:  · I can tell you about my condition.  · I can tell you what changes I need to make with my diet or drugs.  · I can tell you what I will do if I have more heartburn  when I am bending over or lying down.  Where can I learn more?   [facility] Cho[facility]  http://www.nhs.[place]/conditions/hernia-hiatus/pages/introduction.aspx   Last Reviewed Date   2021-09-28  Consumer Information Use and Disclaimer   This information is not specific medical advice and does not replace information you receive from your health care provider. This is only a brief summary of general information. It does NOT include all information about conditions, illnesses, injuries, tests, procedures, treatments, therapies, discharge instructions or life-style choices that may apply to you. You must talk with your health care provider for complete information about your health and treatment options. This information should not be used to decide whether or not to accept your health care providers advice, instructions or recommendations. Only your health care provider has the knowledge and training to provide advice that is right for you.  Copyright   Copyright © 2021 [employer] and its affiliates and/or licensors. All rights reserved.  Patient Education       Esophageal Stricture Discharge Instructions   About this topic   The esophagus is a tube which passes food from the mouth to the stomach. When the esophagus narrows through the buildup of scar tissue, this condition is called an esophageal stricture. It can cause problems with swallowing. Other signs could include weight loss or reflux of foods and liquids.  Treatment includes trying to stretch the esophagus and drugs.  What care is needed at home?   · Ask your doctor what you need to do when you go home. Make sure you ask questions if you do not understand what the doctor says. This way you will know what you need to do.  · Suck on ice chips or a popsicle to help with throat pain.  · You may need to start eating soft foods like soups and pureed fruits and vegetables. You can start eating solid foods when your doctor tells you to.  · Avoid eating late at night, as that  is when reflux could be worse.  What follow-up care is needed?   · Your condition needs close monitoring. Your doctor may ask you to make visits to the office to check on your progress. Be sure to keep these visits.  · Your doctor will tell you if other tests are needed.  · If your doctor stretches or dilates your esophagus, you may need to have it done again if the stricture remains.  · Your doctor may send you to a speech pathologist if needed. They can teach you a safer way to swallow.  · Your doctor may send you to a dietician. They can tell you what foods you can and cannot eat.  What drugs may be needed?   The doctor may order drugs to:  · Help with pain  · Prevent infection  · Control stomach acid production  Will physical activity be limited?   You may have to limit your activity. Talk to your doctor about the right amount of activity for you.  What changes to diet are needed?   · Stay away from foods that may bother your throat.  · Avoid eating foods that are too hard to chew and may be too hard to swallow.  · Make sure that foods you are eating, like soups and hot drinks, are not too hot.  · Learn what foods make your acid reflux worse, and avoid them.  What problems could happen?   · Food can get stuck inside the esophagus  · Choking  · Weight loss  · Food or fluid can enter the lungs and cause infection. This is aspiration pneumonia.  · Stricture can come back  What can be done to prevent this health problem?   · If you have signs of reflux, like constantly tasting something acidic in your mouth and burning pain in the chest, call your doctor.  · Stop smoking. Stay away from second- and third-hand smoke.  · Keep household chemicals and cleaning solutions in a safe cabinet. Never move chemicals to water bottles.  · If you have any swallowing problems after esophagus surgery, call your doctor right away.  When do I need to call the doctor?   · Throat pain not relieved by drugs  · Throwing up with or  without blood  · Problems keeping any food or liquids down  · Problems burping or hiccupping  · Chest pain  · Sudden breathing problems  · Swallowing problems  · Voice hoarseness  Teach Back: Helping You Understand   The Teach Back Method helps you understand the information we are giving you. After you talk with the staff, tell them in your own words what you learned. This helps to make sure the staff has described each thing clearly. It also helps to explain things that may have been confusing. Before going home, make sure you can do these:  · I can tell you about my procedure.  · I can tell you what changes I need to make with my diet or drugs.  · I can tell you what I will do if I am throwing up, have problems keeping food down, or have trouble breathing.  Last Reviewed Date   2021-03-11  Consumer Information Use and Disclaimer   This information is not specific medical advice and does not replace information you receive from your health care provider. This is only a brief summary of general information. It does NOT include all information about conditions, illnesses, injuries, tests, procedures, treatments, therapies, discharge instructions or life-style choices that may apply to you. You must talk with your health care provider for complete information about your health and treatment options. This information should not be used to decide whether or not to accept your health care providers advice, instructions or recommendations. Only your health care provider has the knowledge and training to provide advice that is right for you.  Copyright   Copyright © 2021 [employer] and its affiliates and/or licensors. All rights reserved.  Patient Education       Colon Polypectomy Discharge Instructions   About this topic   The colon is also called the large intestine. It is a long, hollow tube at the end of your digestive tract. It absorbs water from solid waste and changes it from liquid to a solid bowel  movement.  A colon polyp is a growth of extra tissue that is not normally in your colon. Colon polyps do not often cause any signs. Most colon polyps are not cancer, but some polyps may turn into cancer. The doctor takes the polyps out during a procedure called a colonoscopy and sends them to the lab for a check to make sure there is no cancer.  You may have a colon polyp or multiple polyps removed. The doctor will send them to the lab to see what type they are. If a polyp is very large, it may need to be removed by surgery.     What care is needed at home?   · Ask your doctor what you need to do when you go home. Make sure you ask questions if you do not understand what the doctor says.  · Do not drive for 24 hours after a colonoscopy.  · Take your drugs as ordered by your doctor.  · Go back to your normal diet unless your doctor has told you to make some changes in your diet.  · Rest  What follow-up care is needed?   · Your doctor may ask you to make visits to the office to check on your progress. Be sure to keep these visits.  · Your doctor may suggest you get tested regularly. People with colon polyps need to have a colonoscopy regularly to check for the growth of new polyps.  · Some polyps may not be removed and more surgery may be needed.  · The results of the polyp testing will be given to you at one of these visits.  What drugs may be needed?   The doctor may order drugs to:  · Prevent hard stools  · Help with pain  · Reduce your risk of colon polyps or colon cancer  Will physical activity be limited?   You may feel sleepy after the colonoscopy. Try to get some rest.  What can be done to prevent this health problem?   · Have regular colonoscopies.  · Eat foods high in fiber.  · Eat foods low in fat.  · Limit your intake of beer, wine, and mixed drinks (alcohol).  · Ask your doctor or dietitian for a diet that is right for you. Include calcium in your diet. Good sources of calcium include milk, cheese, and  yogurt.  When do I need to call the doctor?   · Signs of infection. These include a fever of 100.4°F (38°C) or higher, chills, and anal itching or pain.  · Bleeding from rectum that gets worse  · Belly becomes swollen and sore  · Upset stomach and throwing up continues after you return home  · Not being able to move your bowels  · Weight loss without trying  · Blood in your stool  Teach Back: Helping You Understand   The Teach Back Method helps you understand the information we are giving you. After you talk with the staff, tell them in your own words what you learned. This helps to make sure the staff has described each thing clearly. It also helps to explain things that may have been confusing. Before going home, make sure you can do these:  · I can tell you about my condition.  · I can tell you what changes I need to make with my diet.  · I can tell you what I will do if my stomach is swollen, I have belly pain, or there is blood in my stool.  Where can I learn more?   [facility]  https://www.betterhealth.[place]/health/ConditionsAndTreatments/colonoscopy   [facility]  https://www.[facility]hs.[place]/conditions/bowel-polyps/   [employer]  https://www.uptodate.com/contents/colon-polyps-beyond-the-basics   Last Reviewed Date   2021-03-10  Consumer Information Use and Disclaimer   This information is not specific medical advice and does not replace information you receive from your health care provider. This is only a brief summary of general information. It does NOT include all information about conditions, illnesses, injuries, tests, procedures, treatments, therapies, discharge instructions or life-style choices that may apply to you. You must talk with your health care provider for complete information about your health and treatment options. This information should not be used to decide whether or not to accept your health care providers advice, instructions or recommendations. Only your health care provider has the knowledge  and training to provide advice that is right for you.  Copyright   Copyright © 2021 [employer] and its affiliates and/or licensors. All rights reserved.

## 2022-01-07 PROBLEM — K63.5 COLON POLYPS: Status: ACTIVE | Noted: 2022-01-07

## 2022-01-07 NOTE — ANESTHESIA POSTPROCEDURE EVALUATION
Anesthesia Post Evaluation    Patient: James Royden Peabody IV    Procedure(s) Performed: Procedure(s) (LRB):  EGD (ESOPHAGOGASTRODUODENOSCOPY) (N/A)  COLONOSCOPY (N/A)    Final Anesthesia Type: general      Patient location during evaluation: GI PACU  Patient participation: Yes- Able to Participate  Level of consciousness: awake and alert  Post-procedure vital signs: reviewed and stable  Pain management: adequate  Airway patency: patent    PONV status at discharge: No PONV  Anesthetic complications: no      Cardiovascular status: hemodynamically stable  Respiratory status: unassisted and spontaneous ventilation  Hydration status: euvolemic  Follow-up not needed.      T: 36.4  HR: 66  RR: 19  BP: 124/69  O2 sat: 96%

## 2022-01-10 LAB
FINAL PATHOLOGIC DIAGNOSIS: NORMAL
GROSS: NORMAL
Lab: NORMAL

## 2022-02-09 ENCOUNTER — OFFICE VISIT (OUTPATIENT)
Dept: ORTHOPEDICS | Facility: CLINIC | Age: 47
End: 2022-02-09
Payer: COMMERCIAL

## 2022-02-09 ENCOUNTER — HOSPITAL ENCOUNTER (OUTPATIENT)
Dept: RADIOLOGY | Facility: HOSPITAL | Age: 47
Discharge: HOME | End: 2022-02-09
Attending: PHYSICIAN ASSISTANT
Payer: COMMERCIAL

## 2022-02-09 ENCOUNTER — OFFICE VISIT (OUTPATIENT)
Dept: INTERNAL MEDICINE | Facility: CLINIC | Age: 47
End: 2022-02-09
Payer: COMMERCIAL

## 2022-02-09 DIAGNOSIS — M25.521 RIGHT ELBOW PAIN: ICD-10-CM

## 2022-02-09 DIAGNOSIS — M77.01 EPICONDYLITIS ELBOW, MEDIAL, RIGHT: Primary | ICD-10-CM

## 2022-02-09 DIAGNOSIS — Z87.19 S/P DILATATION OF ESOPHAGEAL STRICTURE: ICD-10-CM

## 2022-02-09 DIAGNOSIS — Z98.890 S/P DILATATION OF ESOPHAGEAL STRICTURE: ICD-10-CM

## 2022-02-09 DIAGNOSIS — D12.2 ADENOMATOUS POLYP OF ASCENDING COLON: Primary | ICD-10-CM

## 2022-02-09 DIAGNOSIS — Z71.89 ENCOUNTER FOR MEDICATION REVIEW AND COUNSELING: ICD-10-CM

## 2022-02-09 DIAGNOSIS — M77.11 LATERAL EPICONDYLITIS OF RIGHT ELBOW: ICD-10-CM

## 2022-02-09 DIAGNOSIS — M77.11 EPICONDYLITIS, LATERAL, RIGHT: ICD-10-CM

## 2022-02-09 PROCEDURE — 73080 X-RAY EXAM OF ELBOW: CPT | Mod: TC,RT

## 2022-02-09 PROCEDURE — 1159F PR MEDICATION LIST DOCUMENTED IN MEDICAL RECORD: ICD-10-PCS | Mod: CPTII,S$GLB,, | Performed by: INTERNAL MEDICINE

## 2022-02-09 PROCEDURE — 1160F RVW MEDS BY RX/DR IN RCRD: CPT | Mod: CPTII,S$GLB,, | Performed by: PHYSICIAN ASSISTANT

## 2022-02-09 PROCEDURE — 99999 PR PBB SHADOW E&M-EST. PATIENT-LVL II: CPT | Mod: PBBFAC,,, | Performed by: INTERNAL MEDICINE

## 2022-02-09 PROCEDURE — 1159F MED LIST DOCD IN RCRD: CPT | Mod: CPTII,S$GLB,, | Performed by: PHYSICIAN ASSISTANT

## 2022-02-09 PROCEDURE — 99999 PR PBB SHADOW E&M-EST. PATIENT-LVL II: ICD-10-PCS | Mod: PBBFAC,,, | Performed by: INTERNAL MEDICINE

## 2022-02-09 PROCEDURE — 99203 PR OFFICE/OUTPT VISIT, NEW, LEVL III, 30-44 MIN: ICD-10-PCS | Mod: S$GLB,,, | Performed by: PHYSICIAN ASSISTANT

## 2022-02-09 PROCEDURE — 99213 OFFICE O/P EST LOW 20 MIN: CPT | Mod: S$GLB,,, | Performed by: INTERNAL MEDICINE

## 2022-02-09 PROCEDURE — 99999 PR PBB SHADOW E&M-EST. PATIENT-LVL II: ICD-10-PCS | Mod: PBBFAC,,, | Performed by: PHYSICIAN ASSISTANT

## 2022-02-09 PROCEDURE — 1160F RVW MEDS BY RX/DR IN RCRD: CPT | Mod: CPTII,S$GLB,, | Performed by: INTERNAL MEDICINE

## 2022-02-09 PROCEDURE — 1159F PR MEDICATION LIST DOCUMENTED IN MEDICAL RECORD: ICD-10-PCS | Mod: CPTII,S$GLB,, | Performed by: PHYSICIAN ASSISTANT

## 2022-02-09 PROCEDURE — 73080 XR ELBOW COMPLETE 3 VIEW RIGHT: ICD-10-PCS | Mod: 26,RT,, | Performed by: RADIOLOGY

## 2022-02-09 PROCEDURE — 1160F PR REVIEW ALL MEDS BY PRESCRIBER/CLIN PHARMACIST DOCUMENTED: ICD-10-PCS | Mod: CPTII,S$GLB,, | Performed by: INTERNAL MEDICINE

## 2022-02-09 PROCEDURE — 99213 PR OFFICE/OUTPT VISIT, EST, LEVL III, 20-29 MIN: ICD-10-PCS | Mod: S$GLB,,, | Performed by: INTERNAL MEDICINE

## 2022-02-09 PROCEDURE — 99999 PR PBB SHADOW E&M-EST. PATIENT-LVL II: CPT | Mod: PBBFAC,,, | Performed by: PHYSICIAN ASSISTANT

## 2022-02-09 PROCEDURE — 1160F PR REVIEW ALL MEDS BY PRESCRIBER/CLIN PHARMACIST DOCUMENTED: ICD-10-PCS | Mod: CPTII,S$GLB,, | Performed by: PHYSICIAN ASSISTANT

## 2022-02-09 PROCEDURE — 73080 X-RAY EXAM OF ELBOW: CPT | Mod: 26,RT,, | Performed by: RADIOLOGY

## 2022-02-09 PROCEDURE — 1159F MED LIST DOCD IN RCRD: CPT | Mod: CPTII,S$GLB,, | Performed by: INTERNAL MEDICINE

## 2022-02-09 PROCEDURE — 99203 OFFICE O/P NEW LOW 30 MIN: CPT | Mod: S$GLB,,, | Performed by: PHYSICIAN ASSISTANT

## 2022-02-09 RX ORDER — METHYLPREDNISOLONE 4 MG/1
TABLET ORAL
Qty: 1 EACH | Refills: 0 | Status: SHIPPED | OUTPATIENT
Start: 2022-02-09 | End: 2022-07-21 | Stop reason: ALTCHOICE

## 2022-02-09 RX ORDER — MELOXICAM 15 MG/1
15 TABLET ORAL DAILY
Qty: 30 TABLET | Refills: 1 | Status: SHIPPED | OUTPATIENT
Start: 2022-02-09 | End: 2022-04-08

## 2022-02-09 NOTE — PROGRESS NOTES
Chief Complaint & History of Present Illness:  James Royden Peabody IV is a New patient 46 y.o. male who is seen here today with a complaint of  right elbow pain .  Patient is here today for evaluation treatment of gradual onset of worsening pain in his right elbow the past 4 months.  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  There has been no specific trauma or injury but has had worsening symptoms particularly over the last month.  He is now having difficulty with sleep and caring out many of his activities.  He is treated with over-the-counter NSAIDs and a short course meloxicam with only short-term benefit.  He has used ice regularly but continues to struggle with decreasing range of motion increasing pain.  After a lengthy discussion he has expressed he is unable to decrease his activity level at this point secondary to work requirements but may have a chance to rest the arm in approximately 6-8 weeks  On a scale of 1-10, with 10 being worst pain imaginable, he rates this pain as 5 on good days and 9 on bad days.  he describes the pain as tender.    Review of patient's allergies indicates:  No Known Allergies      Current Outpatient Medications   Medication Sig Dispense Refill    cetirizine (ZYRTEC) 10 MG tablet Take 10 mg by mouth daily as needed for Allergies.      omeprazole (PRILOSEC OTC) 20 MG tablet Take 20 mg by mouth once daily.      omeprazole (PRILOSEC) 40 MG capsule Take 1 capsule (40 mg total) by mouth once daily. 90 capsule 3    valACYclovir (VALTREX) 1000 MG tablet Take 1 tablet (1,000 mg total) by mouth every 12 (twelve) hours. for 7 days 14 tablet 0     No current facility-administered medications for this visit.       Past Medical History:   Diagnosis Date    GERD (gastroesophageal reflux disease)     Hyperlipidemia        Past Surgical History:   Procedure Laterality Date    COLONOSCOPY N/A 1/6/2022    Procedure: COLONOSCOPY;   Surgeon: Андрей Obrien MD;  Location: [facility];  Service: Endoscopy;  Laterality: N/A;    ESOPHAGOGASTRODUODENOSCOPY N/A 10/8/2019    Procedure: EGD (ESOPHAGOGASTRODUODENOSCOPY);  Surgeon: Marito Pringle MD;  Location: [facility] ENDO (4TH FLR);  Service: Endoscopy;  Laterality: N/A;    ESOPHAGOGASTRODUODENOSCOPY N/A 1/6/2022    Procedure: EGD (ESOPHAGOGASTRODUODENOSCOPY);  Surgeon: Андрей Obrien MD;  Location: [facility] ENDO;  Service: Endoscopy;  Laterality: N/A;  fully vaccinated  instructions portal -ml  Rapid covid    HERNIA REPAIR         Vital Signs (Most Recent)  There were no vitals filed for this visit.        Review of Systems:  ROS:  Constitutional: no fever or chills  Eyes: no visual changes  ENT: no nasal congestion or sore throat  Respiratory: no cough or shortness of breath  Cardiovascular: no chest pain or palpitations  Gastrointestinal: no nausea or vomiting, tolerating diet, Positive for GERD, esophageal stricture  Genitourinary: no hematuria or dysuria  Integument/Breast: no rash or pruritis  Hematologic/Lymphatic: no easy bruising or lymphadenopathy  Musculoskeletal: no arthralgias or myalgias  Neurological: no seizures or tremors  Behavioral/Psych: no auditory or visual hallucinations  Endocrine: no heat or cold intolerance                OBJECTIVE:     PHYSICAL EXAM:     , General Appearance: Well nourished, well developed, in no acute distress.  Neurological: Mood & affect are normal.  right  Elbow:   History of injury: no, repetitive injury  Pain: Description: severe and worsening  Night pain: yes, moderate and worsening  Rest pain: yes, moderate and worsening  Quality: aching, sharp and tender  Location: lateral, medial and posterior  Radiates to: arm  Exacerbating factors: lifting and repetitive activity  Alleviating factors: ice and rest  Mass/swelling: none  Neurological complaints: numbness intermittent associated with position or activity  reduced range of motion of elbow, lateral epicondylar  tenderness, medial epicondylar tenderness, olecranon tenderness, radial pulse normal, positive Cozen  ROM: flexion arc 5-140, pronation 80 and supination 90  Strength: normal, flexion grade 5 of 5, extension grade 5 of 5, supination grade 5 of 5 and pronation grade 5 of 5      RADIOGRAPHS:  X-rays taken today films of the right demonstrate no evidence of fracture dislocation about the shoulder joint spaces are well maintained advanced degenerative joint disease    ASSESSMENT/PLAN:       ICD-10-CM ICD-9-CM   1. Right elbow pain  M25.521 719.42       Plan: We discussed with the patient at length all the different treatment options available for his elbow including anti-inflammatories, acetaminophen, rest, ice, physical therapy to include strengthening, range of motion exercise,  splinting,  occasional cortisone injections for temporary relief,  or possible surgical interventions.  Patient cannot undergo splinting or casting for immobilization at this point secondary to his work duties.  We have a lengthy conversation regarding the knee and decrease his activity and load on that elbow in order to help decrease the inflammation    Meloxicam 15 milligrams q.d. with food times 14 days  Medrol Dosepak as per package directions  Continue ice and elevation as directed  Attempted decrease activity as much as possible  Patient will follow-up after completion of his project to consider immobilization or more aggressive therapies  Poor candidate for injection therapy secondary to work

## 2022-02-09 NOTE — PROGRESS NOTES
Mr Peabody is here for his review. I gave him copies of his studies and discussed them in detail. These included colonoscopy reports which showed a small polyp in the ascending colon; path reports serrated adenoma w/o dysplasia. I recommended repeat colonoscopy in 5 years. EGD report was provided that showed a mild Schatzki ring successfully dilatated. He reports much improved swallowing. Will monitor periodically.  He reported having issues with a very tender R elbow for several months now. 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 He has been placing ice on his elbow with minimal relief.  On exam, he has tenderness at the insertion of the R brachioradialis tendon at the R epicondyle laterally and medially. I will send him to Ortho for evaluation and treatment.  RTC as needed.

## 2022-07-21 ENCOUNTER — OFFICE VISIT (OUTPATIENT)
Dept: URGENT CARE | Facility: CLINIC | Age: 47
End: 2022-07-21
Payer: COMMERCIAL

## 2022-07-21 VITALS
HEIGHT: 69 IN | DIASTOLIC BLOOD PRESSURE: 90 MMHG | HEART RATE: 71 BPM | BODY MASS INDEX: 29.33 KG/M2 | SYSTOLIC BLOOD PRESSURE: 132 MMHG | OXYGEN SATURATION: 98 % | TEMPERATURE: 98 F | WEIGHT: 198 LBS

## 2022-07-21 DIAGNOSIS — M25.512 ACUTE PAIN OF LEFT SHOULDER: Primary | ICD-10-CM

## 2022-07-21 PROCEDURE — 1159F MED LIST DOCD IN RCRD: CPT | Mod: CPTII,S$GLB,, | Performed by: NURSE PRACTITIONER

## 2022-07-21 PROCEDURE — 3075F PR MOST RECENT SYSTOLIC BLOOD PRESS GE 130-139MM HG: ICD-10-PCS | Mod: CPTII,S$GLB,, | Performed by: NURSE PRACTITIONER

## 2022-07-21 PROCEDURE — 3080F PR MOST RECENT DIASTOLIC BLOOD PRESSURE >= 90 MM HG: ICD-10-PCS | Mod: CPTII,S$GLB,, | Performed by: NURSE PRACTITIONER

## 2022-07-21 PROCEDURE — 3075F SYST BP GE 130 - 139MM HG: CPT | Mod: CPTII,S$GLB,, | Performed by: NURSE PRACTITIONER

## 2022-07-21 PROCEDURE — 3008F PR BODY MASS INDEX (BMI) DOCUMENTED: ICD-10-PCS | Mod: CPTII,S$GLB,, | Performed by: NURSE PRACTITIONER

## 2022-07-21 PROCEDURE — 3008F BODY MASS INDEX DOCD: CPT | Mod: CPTII,S$GLB,, | Performed by: NURSE PRACTITIONER

## 2022-07-21 PROCEDURE — 99213 PR OFFICE/OUTPT VISIT, EST, LEVL III, 20-29 MIN: ICD-10-PCS | Mod: 25,S$GLB,, | Performed by: NURSE PRACTITIONER

## 2022-07-21 PROCEDURE — 99213 OFFICE O/P EST LOW 20 MIN: CPT | Mod: 25,S$GLB,, | Performed by: NURSE PRACTITIONER

## 2022-07-21 PROCEDURE — 1160F PR REVIEW ALL MEDS BY PRESCRIBER/CLIN PHARMACIST DOCUMENTED: ICD-10-PCS | Mod: CPTII,S$GLB,, | Performed by: NURSE PRACTITIONER

## 2022-07-21 PROCEDURE — 1160F RVW MEDS BY RX/DR IN RCRD: CPT | Mod: CPTII,S$GLB,, | Performed by: NURSE PRACTITIONER

## 2022-07-21 PROCEDURE — 96372 PR INJECTION,THERAP/PROPH/DIAG2ST, IM OR SUBCUT: ICD-10-PCS | Mod: S$GLB,,, | Performed by: NURSE PRACTITIONER

## 2022-07-21 PROCEDURE — 1159F PR MEDICATION LIST DOCUMENTED IN MEDICAL RECORD: ICD-10-PCS | Mod: CPTII,S$GLB,, | Performed by: NURSE PRACTITIONER

## 2022-07-21 PROCEDURE — 96372 THER/PROPH/DIAG INJ SC/IM: CPT | Mod: S$GLB,,, | Performed by: NURSE PRACTITIONER

## 2022-07-21 PROCEDURE — 3080F DIAST BP >= 90 MM HG: CPT | Mod: CPTII,S$GLB,, | Performed by: NURSE PRACTITIONER

## 2022-07-21 RX ORDER — KETOROLAC TROMETHAMINE 30 MG/ML
30 INJECTION, SOLUTION INTRAMUSCULAR; INTRAVENOUS ONCE
Status: COMPLETED | OUTPATIENT
Start: 2022-07-21 | End: 2022-07-21

## 2022-07-21 RX ORDER — METHOCARBAMOL 750 MG/1
750 TABLET, FILM COATED ORAL 3 TIMES DAILY
Qty: 15 TABLET | Refills: 0 | Status: SHIPPED | OUTPATIENT
Start: 2022-07-21 | End: 2022-07-26

## 2022-07-21 RX ADMIN — KETOROLAC TROMETHAMINE 30 MG: 30 INJECTION, SOLUTION INTRAMUSCULAR; INTRAVENOUS at 09:07

## 2022-07-21 NOTE — PROGRESS NOTES
"Subjective:       Patient ID: James Royden Peabody IV is a 46 y.o. male.    Vitals:  height is 5' 9" (1.753 m) and weight is 89.8 kg (198 lb). His temperature is 97.6 °F (36.4 °C). His blood pressure is 132/90 (abnormal) and his pulse is 71. His oxygen saturation is 98%.     Chief Complaint: Shoulder Pain    Pt presents with left shoulder pain x 5 days. Pt states that he injured his shoulder moving a pool table for his son and has since been having but states the last 4 days has increased  uncomfortably. Pt has been using meloxicam, ice compresses, epsom salt soaks, and stretching with little to no relief. Pt also reports that he is unable to sleep at night. Due to the pain. Pt also reports intermittent numbness in thumb,. Index, and middle fingers on left side. Pt states he's had similar problems on the right side in the past.    Shoulder Pain   The pain is present in the left shoulder. This is a new problem. The current episode started in the past 7 days. The problem occurs constantly. The problem has been gradually worsening. The quality of the pain is described as aching, pounding and sharp. The pain is at a severity of 8/10. The pain is moderate. Associated symptoms include stiffness and tingling. The symptoms are aggravated by activity. He has tried cold, rest and movement for the symptoms. The treatment provided no relief.       Musculoskeletal: Positive for pain and muscle ache.       Objective:      Physical Exam   Constitutional: He is oriented to person, place, and time. He appears well-developed. He is cooperative. No distress.   HENT:   Head: Normocephalic and atraumatic.   Nose: Nose normal.   Mouth/Throat: Oropharynx is clear and moist and mucous membranes are normal.   Eyes: Conjunctivae and lids are normal.   Neck: Trachea normal and phonation normal. Neck supple.   Cardiovascular: Normal rate, regular rhythm, normal heart sounds and normal pulses.   Pulmonary/Chest: Effort normal and breath sounds " normal.   Abdominal: Normal appearance and bowel sounds are normal. He exhibits no abdominal bruit, no pulsatile midline mass and no mass. Soft.   Musculoskeletal:         General: No deformity.      Left shoulder: Normal.        Arms:    Neurological: He is alert and oriented to person, place, and time. He has normal strength and normal reflexes. No sensory deficit.   Skin: Skin is warm, dry, intact and not diaphoretic.   Psychiatric: His speech is normal and behavior is normal. Judgment and thought content normal.   Nursing note and vitals reviewed.        Assessment:       1. Acute pain of left shoulder          Plan:     Follow up with Ortho for further evaluation of left shoulder pain.    Acute pain of left shoulder    Other orders  -     ketorolac injection 30 mg  -     methocarbamoL (ROBAXIN) 750 MG Tab; Take 1 tablet (750 mg total) by mouth 3 (three) times daily. for 5 days  Dispense: 15 tablet; Refill: 0

## 2022-07-25 ENCOUNTER — TELEPHONE (OUTPATIENT)
Dept: ORTHOPEDICS | Facility: CLINIC | Age: 47
End: 2022-07-25
Payer: COMMERCIAL

## 2022-07-25 NOTE — TELEPHONE ENCOUNTER
Spoke to patient in regards to message. Patient scheduled to see Dr. Hill for upper back pain on 07/28/2022. Patient stated thank you. Thanks.

## 2022-07-27 DIAGNOSIS — M50.30 DDD (DEGENERATIVE DISC DISEASE), CERVICAL: ICD-10-CM

## 2022-07-27 DIAGNOSIS — M51.34 DDD (DEGENERATIVE DISC DISEASE), THORACIC: Primary | ICD-10-CM

## 2022-07-28 ENCOUNTER — HOSPITAL ENCOUNTER (OUTPATIENT)
Dept: RADIOLOGY | Facility: HOSPITAL | Age: 47
Discharge: HOME | End: 2022-07-28
Attending: ORTHOPAEDIC SURGERY
Payer: COMMERCIAL

## 2022-07-28 ENCOUNTER — OFFICE VISIT (OUTPATIENT)
Dept: ORTHOPEDICS | Facility: CLINIC | Age: 47
End: 2022-07-28
Payer: COMMERCIAL

## 2022-07-28 VITALS — BODY MASS INDEX: 29.33 KG/M2 | WEIGHT: 198 LBS | HEIGHT: 69 IN

## 2022-07-28 DIAGNOSIS — M51.36 DDD (DEGENERATIVE DISC DISEASE), LUMBAR: ICD-10-CM

## 2022-07-28 DIAGNOSIS — M48.02 SPINAL STENOSIS, CERVICAL REGION: ICD-10-CM

## 2022-07-28 DIAGNOSIS — M50.30 DDD (DEGENERATIVE DISC DISEASE), CERVICAL: ICD-10-CM

## 2022-07-28 DIAGNOSIS — M54.12 CERVICAL RADICULOPATHY: ICD-10-CM

## 2022-07-28 DIAGNOSIS — M47.812 CERVICAL SPONDYLOSIS: Primary | ICD-10-CM

## 2022-07-28 DIAGNOSIS — M51.34 DDD (DEGENERATIVE DISC DISEASE), THORACIC: ICD-10-CM

## 2022-07-28 PROCEDURE — 72050 X-RAY EXAM NECK SPINE 4/5VWS: CPT | Mod: 26,,, | Performed by: RADIOLOGY

## 2022-07-28 PROCEDURE — 1159F MED LIST DOCD IN RCRD: CPT | Mod: CPTII,S$GLB,, | Performed by: ORTHOPAEDIC SURGERY

## 2022-07-28 PROCEDURE — 1159F PR MEDICATION LIST DOCUMENTED IN MEDICAL RECORD: ICD-10-PCS | Mod: CPTII,S$GLB,, | Performed by: ORTHOPAEDIC SURGERY

## 2022-07-28 PROCEDURE — 1160F PR REVIEW ALL MEDS BY PRESCRIBER/CLIN PHARMACIST DOCUMENTED: ICD-10-PCS | Mod: CPTII,S$GLB,, | Performed by: ORTHOPAEDIC SURGERY

## 2022-07-28 PROCEDURE — 99999 PR PBB SHADOW E&M-EST. PATIENT-LVL III: CPT | Mod: PBBFAC,,, | Performed by: ORTHOPAEDIC SURGERY

## 2022-07-28 PROCEDURE — 99999 PR PBB SHADOW E&M-EST. PATIENT-LVL III: ICD-10-PCS | Mod: PBBFAC,,, | Performed by: ORTHOPAEDIC SURGERY

## 2022-07-28 PROCEDURE — 72070 X-RAY EXAM THORAC SPINE 2VWS: CPT | Mod: TC

## 2022-07-28 PROCEDURE — 99214 PR OFFICE/OUTPT VISIT, EST, LEVL IV, 30-39 MIN: ICD-10-PCS | Mod: S$GLB,,, | Performed by: ORTHOPAEDIC SURGERY

## 2022-07-28 PROCEDURE — 72050 X-RAY EXAM NECK SPINE 4/5VWS: CPT | Mod: TC

## 2022-07-28 PROCEDURE — 99214 OFFICE O/P EST MOD 30 MIN: CPT | Mod: S$GLB,,, | Performed by: ORTHOPAEDIC SURGERY

## 2022-07-28 PROCEDURE — 1160F RVW MEDS BY RX/DR IN RCRD: CPT | Mod: CPTII,S$GLB,, | Performed by: ORTHOPAEDIC SURGERY

## 2022-07-28 PROCEDURE — 72070 X-RAY EXAM THORAC SPINE 2VWS: CPT | Mod: 26,,, | Performed by: RADIOLOGY

## 2022-07-28 PROCEDURE — 72050 XR CERVICAL SPINE AP LAT WITH FLEX EXTEN: ICD-10-PCS | Mod: 26,,, | Performed by: RADIOLOGY

## 2022-07-28 PROCEDURE — 3008F PR BODY MASS INDEX (BMI) DOCUMENTED: ICD-10-PCS | Mod: CPTII,S$GLB,, | Performed by: ORTHOPAEDIC SURGERY

## 2022-07-28 PROCEDURE — 72070 XR THORACIC SPINE AP LATERAL: ICD-10-PCS | Mod: 26,,, | Performed by: RADIOLOGY

## 2022-07-28 PROCEDURE — 3008F BODY MASS INDEX DOCD: CPT | Mod: CPTII,S$GLB,, | Performed by: ORTHOPAEDIC SURGERY

## 2022-07-28 RX ORDER — CYCLOBENZAPRINE HCL 10 MG
10 TABLET ORAL 3 TIMES DAILY PRN
Qty: 60 TABLET | Refills: 1 | Status: SHIPPED | OUTPATIENT
Start: 2022-07-28 | End: 2023-01-16 | Stop reason: SDUPTHER

## 2022-07-28 RX ORDER — MELOXICAM 15 MG/1
15 TABLET ORAL DAILY
Qty: 60 TABLET | Refills: 1 | Status: SHIPPED | OUTPATIENT
Start: 2022-07-28 | End: 2023-01-16 | Stop reason: SDUPTHER

## 2022-07-28 RX ORDER — GABAPENTIN 300 MG/1
300 CAPSULE ORAL 3 TIMES DAILY
Qty: 60 CAPSULE | Refills: 1 | Status: SHIPPED | OUTPATIENT
Start: 2022-07-28 | End: 2022-09-23

## 2022-07-28 NOTE — PROGRESS NOTES
DATE: 7/28/2022  PATIENT: James Royden Peabody IV    Attending Physician: Conrad Hill M.D.    CHIEF COMPLAINT: neck and LUE pain    HISTORY:  James Royden Peabody IV is a 46 y.o. male w/ a hx of R CTS presents for initial evaluation of neck and left arm pain (Neck - 6, Arm - 6). The pain has been present for 3 weeks after lifting a pool table. The patient describes the pain as sharp and it radiates to the left elbow. The pain is worse with activity and improved by rest. There is associated numbness and tingling in the R radial fingers and left ulnar 3 fingers. There is subjective weakness in LUE. Prior treatments have included meds (mobic and zanaflex), but no PT, GER or surgery.     The Patient ENDORSES myelopathic symptoms such as handwriting changes or difficulty with buttons/coins/keys. He has no trouble with balance. Denies perineal paresthesias, bowel/bladder dysfunction.    The patient smokes 1ppd for 30 years; he does not have DM or endorse IVDU. The patient is not on any blood thinners and does not take chronic narcotics. 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    PAST MEDICAL/SURGICAL HISTORY:  Past Medical History:   Diagnosis Date    GERD (gastroesophageal reflux disease)     Hyperlipidemia      Past Surgical History:   Procedure Laterality Date    COLONOSCOPY N/A 1/6/2022    Procedure: COLONOSCOPY;  Surgeon: Андрей Obrien MD;  Location: [facility];  Service: Endoscopy;  Laterality: N/A;    ESOPHAGOGASTRODUODENOSCOPY N/A 10/8/2019    Procedure: EGD (ESOPHAGOGASTRODUODENOSCOPY);  Surgeon: Marito Pringle MD;  Location: [address]);  Service: Endoscopy;  Laterality: N/A;    ESOPHAGOGASTRODUODENOSCOPY N/A 1/6/2022    Procedure: EGD (ESOPHAGOGASTRODUODENOSCOPY);  Surgeon: Андрей Obrien MD;  Location: [facility];  Service: Endoscopy;  Laterality: N/A;  fully vaccinated  instructions portal -ml  Rapid covid    HERNIA REPAIR         Current Medications:   Current Outpatient Medications:     omeprazole (PRILOSEC)  40 MG capsule, Take 1 capsule (40 mg total) by mouth once daily., Disp: 90 capsule, Rfl: 3    cetirizine (ZYRTEC) 10 MG tablet, Take 10 mg by mouth daily as needed for Allergies., Disp: , Rfl:     cyclobenzaprine (FLEXERIL) 10 MG tablet, Take 1 tablet (10 mg total) by mouth 3 (three) times daily as needed for Muscle spasms., Disp: 60 tablet, Rfl: 1    gabapentin (NEURONTIN) 300 MG capsule, Take 1 capsule (300 mg total) by mouth 3 (three) times daily., Disp: 60 capsule, Rfl: 1    meloxicam (MOBIC) 15 MG tablet, Take 1 tablet (15 mg total) by mouth once daily., Disp: 60 tablet, Rfl: 1    omeprazole (PRILOSEC OTC) 20 MG tablet, Take 20 mg by mouth once daily., Disp: , Rfl:     valACYclovir (VALTREX) 1000 MG tablet, Take 1 tablet (1,000 mg total) by mouth every 12 (twelve) hours. for 7 days, Disp: 14 tablet, Rfl: 0    Social History:   Social History     Socioeconomic History    Marital status:    Tobacco Use    Smoking status: Current Every Day Smoker     Packs/day: 1.00     Types: Cigarettes    Smokeless tobacco: Never Used   Substance and Sexual Activity    Alcohol use: Yes    Drug use: No   Social History Narrative     and repair. M x 9, 3 kids       REVIEW OF SYSTEMS:  Constitution: Negative. Negative for chills, fever and night sweats.   Cardiovascular: Negative for chest pain and syncope.   Respiratory: Negative for cough and shortness of breath.   Gastrointestinal: See HPI. Negative for nausea/vomiting. Negative for abdominal pain.  Genitourinary: See HPI. Negative for discoloration or dysuria.  Skin: Negative for dry skin, itching and rash.   Hematologic/Lymphatic: negative for bleeding/clotting disorders.   Musculoskeletal: Negative for falls and muscle weakness.   Neurological: See HPI. no history of seizures. no history of cranial surgery or shunts.  Endocrine: Negative for polydipsia, polyphagia and polyuria.   Allergic/Immunologic: Negative for hives and persistent  "infections.  Psychiatric/Behavioral: Negative for depression and insomnia.         EXAM:  Ht 5' 8.5" (1.74 m)   Wt 89.8 kg (198 lb)   BMI 29.67 kg/m²     General: The patient is a 46 y.o. male in no apparent distress, the patient is orientatied to person, place and time.  Psych: Normal mood and affect  HEENT: Vision grossly intact, hearing intact to the spoken word.  Lungs: Respirations unlabored.  Gait: Normal station and gait, no difficulty with toe or heel walk.   Skin: Cervical skin negative for rashes, lesions, hairy patches and surgical scars.  Range of motion: Cervical range of motion is acceptable. There is posterior cervical tenderness to palpation.  Spinal Balance: Global saggital and coronal spinal balance acceptable, no significant for scoliosis and kyphosis.  Musculoskeletal: No pain with the range of motion of the bilateral shoulders and elbows. Normal bulk and contour of the bilateral hands.  Vascular: Bilateral hands warm and well perfused, Radial pulses 2+ bilaterally.  Neurological: Normal strength and tone in all major motor groups in the bilateral upper and lower extremities. Normal sensation to light touch in the C5-T1 and L2-S1 dermatomes bilaterally.  Deep tendon reflexes symmetric 2+ in the bilateral upper and lower extremities.  + L De La Fuente's    IMAGING:   Today I independently reviewed the following images and my interpretations are as follows:    AP, Lat and Flex/Ex  upright C-spine films demonstrate cervical spondylosis and DDD. No listhesis.    Thoracic XRs showed no fractures or listhesis.     Body mass index is 29.67 kg/m².  No results found for: HGBA1C    ASSESSMENT/PLAN:  Cervical spondylosis    DDD (degenerative disc disease), lumbar    Cervical radiculopathy  -     MRI Cervical Spine Without Contrast; Future; Expected date: 07/28/2022  -     meloxicam (MOBIC) 15 MG tablet; Take 1 tablet (15 mg total) by mouth once daily.  Dispense: 60 tablet; Refill: 1  -     cyclobenzaprine " (FLEXERIL) 10 MG tablet; Take 1 tablet (10 mg total) by mouth 3 (three) times daily as needed for Muscle spasms.  Dispense: 60 tablet; Refill: 1  -     gabapentin (NEURONTIN) 300 MG capsule; Take 1 capsule (300 mg total) by mouth 3 (three) times daily.  Dispense: 60 capsule; Refill: 1    Spinal stenosis, cervical region  -     MRI Cervical Spine Without Contrast; Future; Expected date: 07/28/2022      Follow up in about 2 weeks (around 8/11/2022).    Patient has cervical spondylosis and LUE radiculopathy. He has [facility] exam findings, so I ordered cervical MRI to evaluate stenosis. I discussed the natural history of their diagnoses as well as surgical and nonsurgical treatment options. I educated the patient on the importance of core/back strengthening, correct posture, bending/lifting ergonomics, and low-impact aerobic exercises (walking, elliptical, and aquatherapy). I prescribed mobic, flexeril and gabapentin. Patient will follow up in 2 weeks for MRI review.    Conrad Hill MD  Orthopaedic Spine Surgeon  Department of Orthopaedic Surgery  [phone]

## 2022-08-23 ENCOUNTER — HOSPITAL ENCOUNTER (OUTPATIENT)
Dept: RADIOLOGY | Facility: HOSPITAL | Age: 47
Discharge: HOME | End: 2022-08-23
Attending: ORTHOPAEDIC SURGERY
Payer: COMMERCIAL

## 2022-08-23 DIAGNOSIS — M54.12 CERVICAL RADICULOPATHY: ICD-10-CM

## 2022-08-23 DIAGNOSIS — M48.02 SPINAL STENOSIS, CERVICAL REGION: ICD-10-CM

## 2022-08-23 PROCEDURE — 72141 MRI CERVICAL SPINE WITHOUT CONTRAST: ICD-10-PCS | Mod: 26,,, | Performed by: RADIOLOGY

## 2022-08-23 PROCEDURE — 72141 MRI NECK SPINE W/O DYE: CPT | Mod: 26,,, | Performed by: RADIOLOGY

## 2022-08-23 PROCEDURE — 72141 MRI NECK SPINE W/O DYE: CPT | Mod: TC

## 2022-08-30 ENCOUNTER — OFFICE VISIT (OUTPATIENT)
Dept: ORTHOPEDICS | Facility: CLINIC | Age: 47
End: 2022-08-30
Payer: COMMERCIAL

## 2022-08-30 DIAGNOSIS — M54.12 CERVICAL RADICULOPATHY: ICD-10-CM

## 2022-08-30 DIAGNOSIS — M47.812 CERVICAL SPONDYLOSIS: Primary | ICD-10-CM

## 2022-08-30 DIAGNOSIS — M51.36 DDD (DEGENERATIVE DISC DISEASE), LUMBAR: ICD-10-CM

## 2022-08-30 PROCEDURE — 99214 PR OFFICE/OUTPT VISIT, EST, LEVL IV, 30-39 MIN: ICD-10-PCS | Mod: S$GLB,,, | Performed by: ORTHOPAEDIC SURGERY

## 2022-08-30 PROCEDURE — 1160F RVW MEDS BY RX/DR IN RCRD: CPT | Mod: CPTII,S$GLB,, | Performed by: ORTHOPAEDIC SURGERY

## 2022-08-30 PROCEDURE — 1159F PR MEDICATION LIST DOCUMENTED IN MEDICAL RECORD: ICD-10-PCS | Mod: CPTII,S$GLB,, | Performed by: ORTHOPAEDIC SURGERY

## 2022-08-30 PROCEDURE — 1159F MED LIST DOCD IN RCRD: CPT | Mod: CPTII,S$GLB,, | Performed by: ORTHOPAEDIC SURGERY

## 2022-08-30 PROCEDURE — 99999 PR PBB SHADOW E&M-EST. PATIENT-LVL III: CPT | Mod: PBBFAC,,, | Performed by: ORTHOPAEDIC SURGERY

## 2022-08-30 PROCEDURE — 1160F PR REVIEW ALL MEDS BY PRESCRIBER/CLIN PHARMACIST DOCUMENTED: ICD-10-PCS | Mod: CPTII,S$GLB,, | Performed by: ORTHOPAEDIC SURGERY

## 2022-08-30 PROCEDURE — 99999 PR PBB SHADOW E&M-EST. PATIENT-LVL III: ICD-10-PCS | Mod: PBBFAC,,, | Performed by: ORTHOPAEDIC SURGERY

## 2022-08-30 PROCEDURE — 99214 OFFICE O/P EST MOD 30 MIN: CPT | Mod: S$GLB,,, | Performed by: ORTHOPAEDIC SURGERY

## 2022-08-30 NOTE — PROGRESS NOTES
DATE: 8/30/2022  PATIENT: James Royden Peabody IV    Attending Physician: Conrad Hill M.D.    HISTORY:  James Royden Peabody IV is a 47 y.o. male w/ a hx of R CTS who returns to me today for MRI review. Patient's neck and LUE pain have gotten better after doing yoga and pilates. Muscle relaxer has been helping. He still has numbness in R ulnar 3 fingers and L radial 3 fingers. Patient would like to continue conservative management.    PMH/PSH/FamHx/SocHx:  Unchanged from prior visit    ROS:  Positive for neck and LUE pain  Denies myelopathic symptoms, perineal paresthesias, bowel or bladder incontinence    EXAM:  There were no vitals taken for this visit.    My physical examination was notable for the following findings: motor intact BUE; SILT.     IMAGING:  Today I independently reviewed the following images and my interpretations are as follows:    Previous AP and Lat upright C-spine films showed cervical spondylosis and DDD. No listhesis.    MRI cervical showed no significant central stenosis. Severe C5-7 bilateral foraminal stenosis.    ASSESSMENT/PLAN:  Patient has cervical foraminal stenosis but his symptoms have improved. I educated the patient on the importance of core/back strengthening, correct posture, bending/lifting ergonomics, and low-impact aerobic exercises (walking, elliptical, and aquatherapy). Continue medications and exercises. I ordered EMG/NCV BUE. RTC in 3 months for EMG review.    Conrad Hill MD  Orthopaedic Spine Surgeon  Department of Orthopaedic Surgery  [phone]

## 2022-12-01 ENCOUNTER — OFFICE VISIT (OUTPATIENT)
Dept: ORTHOPEDICS | Facility: CLINIC | Age: 47
End: 2022-12-01
Payer: COMMERCIAL

## 2022-12-01 VITALS — WEIGHT: 196.13 LBS | BODY MASS INDEX: 29.05 KG/M2 | HEIGHT: 69 IN

## 2022-12-01 DIAGNOSIS — M47.812 CERVICAL SPONDYLOSIS: ICD-10-CM

## 2022-12-01 DIAGNOSIS — M50.30 DDD (DEGENERATIVE DISC DISEASE), CERVICAL: ICD-10-CM

## 2022-12-01 DIAGNOSIS — M54.12 CERVICAL RADICULOPATHY: Primary | ICD-10-CM

## 2022-12-01 PROCEDURE — 99999 PR PBB SHADOW E&M-EST. PATIENT-LVL III: CPT | Mod: PBBFAC,,, | Performed by: ORTHOPAEDIC SURGERY

## 2022-12-01 PROCEDURE — 99214 PR OFFICE/OUTPT VISIT, EST, LEVL IV, 30-39 MIN: ICD-10-PCS | Mod: S$GLB,,, | Performed by: ORTHOPAEDIC SURGERY

## 2022-12-01 PROCEDURE — 1159F PR MEDICATION LIST DOCUMENTED IN MEDICAL RECORD: ICD-10-PCS | Mod: CPTII,S$GLB,, | Performed by: ORTHOPAEDIC SURGERY

## 2022-12-01 PROCEDURE — 99999 PR PBB SHADOW E&M-EST. PATIENT-LVL III: ICD-10-PCS | Mod: PBBFAC,,, | Performed by: ORTHOPAEDIC SURGERY

## 2022-12-01 PROCEDURE — 1160F RVW MEDS BY RX/DR IN RCRD: CPT | Mod: CPTII,S$GLB,, | Performed by: ORTHOPAEDIC SURGERY

## 2022-12-01 PROCEDURE — 3008F PR BODY MASS INDEX (BMI) DOCUMENTED: ICD-10-PCS | Mod: CPTII,S$GLB,, | Performed by: ORTHOPAEDIC SURGERY

## 2022-12-01 PROCEDURE — 99214 OFFICE O/P EST MOD 30 MIN: CPT | Mod: S$GLB,,, | Performed by: ORTHOPAEDIC SURGERY

## 2022-12-01 PROCEDURE — 1159F MED LIST DOCD IN RCRD: CPT | Mod: CPTII,S$GLB,, | Performed by: ORTHOPAEDIC SURGERY

## 2022-12-01 PROCEDURE — 3008F BODY MASS INDEX DOCD: CPT | Mod: CPTII,S$GLB,, | Performed by: ORTHOPAEDIC SURGERY

## 2022-12-01 PROCEDURE — 1160F PR REVIEW ALL MEDS BY PRESCRIBER/CLIN PHARMACIST DOCUMENTED: ICD-10-PCS | Mod: CPTII,S$GLB,, | Performed by: ORTHOPAEDIC SURGERY

## 2022-12-01 NOTE — PROGRESS NOTES
"DATE: 12/1/2022  PATIENT: James Royden Peabody IV    Attending Physician: Conrad Hill M.D.    HISTORY:  James Royden Peabody IV is a 47 y.o. male w/ a hx of R CTS who returns to me today for FU. Patient's neck and LUE pain have gotten better, but he is having worsening RUE pain in the shoulder, armpit and elbow. He still has numbness in R ulnar 3 fingers and L radial 3 fingers. Patient would like to try trigger point injections. He has not gotten any scheduling instruction from EMG department.    The patient smokes 1ppd for 30 years; he does not have DM or endorse IVDU. The patient is not on any blood thinners and does not take chronic narcotics. He works on building boats.    PMH/PSH/FamHx/SocHx:  Unchanged from prior visit    ROS:  Positive for neck and LUE pain  Denies myelopathic symptoms, perineal paresthesias, bowel or bladder incontinence    EXAM:  Ht 5' 8.5" (1.74 m)   Wt 89 kg (196 lb 1.6 oz)   BMI 29.38 kg/m²     My physical examination was notable for the following findings: motor intact BUE; SILT.     IMAGING:  Today I independently reviewed the following images and my interpretations are as follows:    Previous AP and Lat upright C-spine films showed cervical spondylosis and DDD. No listhesis.    MRI cervical showed no significant central stenosis. Severe C5-7 bilateral foraminal stenosis. Mild-mod C4-5 b/l (R>L) foraminal stenosis.    EMG in 2013 showed R CTS.    ASSESSMENT/PLAN:  Patient has cervical foraminal stenosis and RUE radiculopathy. I educated the patient on the importance of core/back strengthening, correct posture, bending/lifting ergonomics, and low-impact aerobic exercises (walking, elliptical, and aquatherapy). Continue medications and exercises. I referred him to Pain Management for possible injections. I re-ordered EMG/NCV BUE. RTC in 4 weeks for EMG review.    I have provided the patient with a home exercise program. It is the [employer] cervical exercise " program. Exercises include walking erectly with neutral head position, supine neutral head position, supine retraction, sitting or standing neck retraction, posture training, forward/backward/sideward isometric strengthening, prone head lifts, supine head lifts, scapular retraction, and neck rotation. Pt will complete each exercise twice daily for 6-8 weeks.    Conrad Hill MD  Orthopaedic Spine Surgeon  Department of Orthopaedic Surgery  [phone]

## 2023-01-09 ENCOUNTER — PATIENT MESSAGE (OUTPATIENT)
Dept: ORTHOPEDICS | Facility: CLINIC | Age: 48
End: 2023-01-09
Payer: COMMERCIAL

## 2023-01-09 NOTE — TELEPHONE ENCOUNTER
Spoke with patient and he is going outside of [facility] and having his test done EMG they called and gave him a date and time patient will bring results with him to appointment. He stated an verbal understanding.

## 2023-01-13 ENCOUNTER — TELEPHONE (OUTPATIENT)
Dept: ORTHOPEDICS | Facility: CLINIC | Age: 48
End: 2023-01-13
Payer: COMMERCIAL

## 2023-01-13 NOTE — TELEPHONE ENCOUNTER
Spoke with patient and he stated that his EMG is coming from an outside source and will bring the paperwork when he comes in. Patient stated an verbal understanding.

## 2023-01-24 ENCOUNTER — OFFICE VISIT (OUTPATIENT)
Dept: ORTHOPEDICS | Facility: CLINIC | Age: 48
End: 2023-01-24
Payer: COMMERCIAL

## 2023-01-24 DIAGNOSIS — M54.12 CERVICAL RADICULOPATHY: Primary | ICD-10-CM

## 2023-01-24 DIAGNOSIS — M47.812 CERVICAL SPONDYLOSIS: ICD-10-CM

## 2023-01-24 DIAGNOSIS — M50.30 DDD (DEGENERATIVE DISC DISEASE), CERVICAL: ICD-10-CM

## 2023-01-24 PROCEDURE — 99999 PR PBB SHADOW E&M-EST. PATIENT-LVL III: ICD-10-PCS | Mod: PBBFAC,,, | Performed by: ORTHOPAEDIC SURGERY

## 2023-01-24 PROCEDURE — 1159F MED LIST DOCD IN RCRD: CPT | Mod: CPTII,S$GLB,, | Performed by: ORTHOPAEDIC SURGERY

## 2023-01-24 PROCEDURE — 99214 PR OFFICE/OUTPT VISIT, EST, LEVL IV, 30-39 MIN: ICD-10-PCS | Mod: S$GLB,,, | Performed by: ORTHOPAEDIC SURGERY

## 2023-01-24 PROCEDURE — 99214 OFFICE O/P EST MOD 30 MIN: CPT | Mod: S$GLB,,, | Performed by: ORTHOPAEDIC SURGERY

## 2023-01-24 PROCEDURE — 1159F PR MEDICATION LIST DOCUMENTED IN MEDICAL RECORD: ICD-10-PCS | Mod: CPTII,S$GLB,, | Performed by: ORTHOPAEDIC SURGERY

## 2023-01-24 PROCEDURE — 99999 PR PBB SHADOW E&M-EST. PATIENT-LVL III: CPT | Mod: PBBFAC,,, | Performed by: ORTHOPAEDIC SURGERY

## 2023-01-24 PROCEDURE — 1160F RVW MEDS BY RX/DR IN RCRD: CPT | Mod: CPTII,S$GLB,, | Performed by: ORTHOPAEDIC SURGERY

## 2023-01-24 PROCEDURE — 1160F PR REVIEW ALL MEDS BY PRESCRIBER/CLIN PHARMACIST DOCUMENTED: ICD-10-PCS | Mod: CPTII,S$GLB,, | Performed by: ORTHOPAEDIC SURGERY

## 2023-01-24 NOTE — PROGRESS NOTES
DATE: 1/24/2023  PATIENT: James Royden Peabody IV    Attending Physician: Conrad Hill M.D.    HISTORY:  James Royden Peabody IV is a 47 y.o. male w/ a hx of R CTS who returns to me today for EMG review. Patient continues to have neck pain and BUE pain that radiates to the fingers. He still has numbness in R ulnar 3 fingers and L radial 3 fingers. He has been taking meds and doing home exercises without relief. Patient would like to try an GER.    The patient smokes 1ppd for 30 years; he does not have DM or endorse IVDU. The patient is not on any blood thinners and does not take chronic narcotics. He works on building boats.    PMH/PSH/FamHx/SocHx:  Unchanged from prior visit    ROS:  Positive for neck and LUE pain  Denies myelopathic symptoms, perineal paresthesias, bowel or bladder incontinence    EXAM:  There were no vitals taken for this visit.    My physical examination was notable for the following findings: motor intact BUE; SILT. + b/l De La Fuente's    IMAGING:  Today I independently reviewed the following images and my interpretations are as follows:    Previous AP and Lat upright C-spine films showed cervical spondylosis and DDD. No listhesis.    MRI cervical showed no significant central stenosis. Severe C5-7 bilateral foraminal stenosis. Mild-mod C4-5 b/l (R>L) foraminal stenosis.    EMG BUE on 1/19/23 showed R C6 and C7 radiculopathy.    ASSESSMENT/PLAN:  Patient has cervical foraminal stenosis and BUE radiculopathy. I educated the patient on the importance of core/back strengthening, correct posture, bending/lifting ergonomics, and low-impact aerobic exercises (walking, elliptical, and aquatherapy). Continue medications and exercises. I ordered C5-6 GER. I ordered PT for neck ROM. RTC in 6 weeks for re-evaluation. Patient is a candidate for C5-7 ACDF if he fails conservative management.    I have personally examined the patient and agree with the above plan.    Conrad Hill MD  Orthopaedic Spine  Surgeon  Department of Orthopaedic Surgery  [phone]

## 2023-01-24 NOTE — H&P (VIEW-ONLY)
DATE: 1/24/2023  PATIENT: James Royden Peabody IV    Attending Physician: Conrad Hill M.D.    HISTORY:  James Royden Peabody IV is a 47 y.o. male w/ a hx of R CTS who returns to me today for EMG review. Patient continues to have neck pain and BUE pain that radiates to the fingers. He still has numbness in R ulnar 3 fingers and L radial 3 fingers. He has been taking meds and doing home exercises without relief. Patient would like to try an GER.    The patient smokes 1ppd for 30 years; he does not have DM or endorse IVDU. The patient is not on any blood thinners and does not take chronic narcotics. He works on building boats.    PMH/PSH/FamHx/SocHx:  Unchanged from prior visit    ROS:  Positive for neck and LUE pain  Denies myelopathic symptoms, perineal paresthesias, bowel or bladder incontinence    EXAM:  There were no vitals taken for this visit.    My physical examination was notable for the following findings: motor intact BUE; SILT. + b/l De La Fuente's    IMAGING:  Today I independently reviewed the following images and my interpretations are as follows:    Previous AP and Lat upright C-spine films showed cervical spondylosis and DDD. No listhesis.    MRI cervical showed no significant central stenosis. Severe C5-7 bilateral foraminal stenosis. Mild-mod C4-5 b/l (R>L) foraminal stenosis.    EMG BUE on 1/19/23 showed R C6 and C7 radiculopathy.    ASSESSMENT/PLAN:  Patient has cervical foraminal stenosis and BUE radiculopathy. I educated the patient on the importance of core/back strengthening, correct posture, bending/lifting ergonomics, and low-impact aerobic exercises (walking, elliptical, and aquatherapy). Continue medications and exercises. I ordered C5-6 GER. I ordered PT for neck ROM. RTC in 6 weeks for re-evaluation. Patient is a candidate for C5-7 ACDF if he fails conservative management.    I have personally examined the patient and agree with the above plan.    Conrad Hill MD  Orthopaedic Spine  Surgeon  Department of Orthopaedic Surgery  [phone]

## 2023-01-25 ENCOUNTER — PATIENT MESSAGE (OUTPATIENT)
Dept: PAIN MEDICINE | Facility: OTHER | Age: 48
End: 2023-01-25
Payer: COMMERCIAL

## 2023-01-25 ENCOUNTER — CLINICAL SUPPORT (OUTPATIENT)
Dept: REHABILITATION | Facility: HOSPITAL | Age: 48
End: 2023-01-25
Attending: ORTHOPAEDIC SURGERY
Payer: COMMERCIAL

## 2023-01-25 DIAGNOSIS — M54.12 CERVICAL RADICULOPATHY: ICD-10-CM

## 2023-01-25 DIAGNOSIS — M62.81 MUSCLE WEAKNESS OF UPPER EXTREMITY: ICD-10-CM

## 2023-01-25 PROCEDURE — 97161 PT EVAL LOW COMPLEX 20 MIN: CPT | Mod: PO

## 2023-01-25 NOTE — PLAN OF CARE
[facility]   Physical Therapy Initial Evaluation     Date: 1/25/2023   Name: James Royden Peabody IV  Clinic Number: [id number]    Therapy Diagnosis:   Encounter Diagnoses   Name Primary?    Cervical radiculopathy     Muscle weakness of upper extremity      Physician: Conrad Hill MD    Physician Orders: PT Eval and Treat   Medical Diagnosis from Referral: Cervical radiculopathy [M54.12]  Evaluation Date: 1/25/2023  Authorization Period Expiration: 1/24/24  Plan of Care Expiration: 4/25/23  Progress Note Due: 2/25/23  Visit # / Visits authorized: 1/ 1   FOTO: 0/3 - please perform at first follow up visit     Precautions: Standard     Time In: 12:30  Time Out: 1:15  Total Appointment Time (timed & untimed codes): 45 minutes      SUBJECTIVE     Date of onset: off and on for at least 4 months    History of current condition - Tracie reports: has something going on at C6 and C7 so his arms are tingling and they feel weak and painful. When driving, he gets more tingling in the left arm. He has a stabbing pain in the right elbow. The most pain is in the upper back between the shoulder blades. He reports increased pain with paining, grinding, sanding, etc.  With pressure on the left arm, he feels the tingling in the lateral arm, all 5 fingers, and in the lats. He is having trouble sleeping secondary to pain. He is right hand dominant    Falls: no    Imaging, MRI studies: Impression:  Cervical spondylosis, resulting in moderate/ severe neural foraminal narrowing at C5-6 and C6-7, as above.    Prior Therapy: no  Social History:  lives with their family  Occupation:   Prior Level of Function: independent  Current Level of Function: driving, doing his job, sleeping, unable to play disc golf or fish anymore; feels weak with all upper body activity but would like to being lifting weights in the gym     Pain:  Current 7/10, worst 9/10, best 0/10   Location: bilateral arms and hands, upper  thoracic spine    Description: weird uncomfortable feeling; sometimes sharp  Aggravating Factors: anything arm related, sleeping on back,  Easing Factors: gabapentin, meloxicam,     Patients goals: reduce pain, be able to lift weights in the gym, learn exercises to help healing     Medical History:   Past Medical History:   Diagnosis Date    GERD (gastroesophageal reflux disease)     Hyperlipidemia        Surgical History:   James Royden Peabody IV  has a past surgical history that includes Hernia repair; Esophagogastroduodenoscopy (N/A, 10/8/2019); Esophagogastroduodenoscopy (N/A, 1/6/2022); and Colonoscopy (N/A, 1/6/2022).    Medications:   Marito has a current medication list which includes the following prescription(s): cetirizine, cyclobenzaprine, gabapentin, meloxicam, omeprazole, and valacyclovir.    Allergies:   Review of patient's allergies indicates:  No Known Allergies       OBJECTIVE     Observation: forward head, rounded shoulders, increased thoracic kyphosis and abducted scapulas bilaterally    Posture:     Cervical Range of Motion:    Degrees(%) Pain   Flexion 35 no     Extension 55 Lower cervical/upper thoracic     Right Rotation full Same as above     Left Rotation full no     Right Side Bending 45 no   Left Side Bending 50 no          Shoulder Range of Motion: WFL with pain in posterior right shoulder with flexion and abduction   Tenderness over right biceps tendon and at right inferior border of scapula    Upper Extremity Strength  (R) UE  (L) UE    Shoulder flexion: 4/5 Shoulder flexion: 4/5   Shoulder Abduction: 5/5 Shoulder abduction: 5/5   Shoulder ER 5/5 Shoulder ER 5/5   Shoulder IR 5/5 Shoulder IR 5/5   Elbow flexion: 5/5 Elbow flexion: 5/5   Elbow extension: 5/5 Elbow extension: 5/5   Wrist flexion: 5/5 elbow pain Wrist flexion: 5/5   Wrist extension: 5/5 elbow pain  Wrist extension: 5/5       Special Tests:  Distraction + for pain relief    Spurlings +L ; -R     Cervical joint mobility:  no pain noted with CPA in upper cervical spine, pain reproduced with C6-C7 CPA  Tightness noted with sideglide towards L at C6-C7     Right first rib elevated, hypomobile, and tender      Thoracic mobility: significant limitations in upper to mid thoracic spine. Improved mobility beginning around T10    Palpation: tenderness in upper traps, right first rib, cervical paraspinals and some spinous processes      Sensation: NT    Flexibility: limitations in lats and pec minor     Neural tension:   -ULTT Median: + bilaterally  -ULTT Ulnar: + bilaterally  -ULTT Radial: NT      Limitation/Restriction for FOTO neck Survey    Therapist reviewed FOTO scores for James Royden Peabody IV on 1/25/2023.   FOTO documents entered into [employer] - see Media section.    Limitation Score: not performed today - please complete at first follow up visit%         TREATMENT     Total Treatment time (time-based codes) separate from Evaluation: 0 minutes      Tracie received the treatments listed below:      therapeutic exercises to develop strength, endurance, ROM, posture, and core stabilization for 0 minutes including:      manual therapy techniques: Joint mobilizations, Manual traction, and Soft tissue Mobilization were applied for 0 minutes, including:      neuromuscular re-education activities to improve: Coordination, Proprioception, and Posture for 0 minutes. The following activities were included:      therapeutic activities to improve functional performance for 0  minutes, including:            PATIENT EDUCATION AND HOME EXERCISES     Education provided:   - education on condition    Written Home Exercises Provided: not provided today. Exercises were reviewed and Tracie was able to demonstrate them prior to the end of the session.  Tracie demonstrated good  understanding of the education provided. See EMR under Patient Instructions for exercises provided during therapy sessions.    ASSESSMENT     Marito is a 47 y.o. male referred to  outpatient Physical Therapy with a medical diagnosis of Cervical radiculopathy [M54.12]. Patient presents with recent onset of neck pain beginning 4 months ago with objective findings today that include limitations in cervical range of motion, upper extremity strength, neural tension, and cervical and thoracic spinal segmental mobility. While the neck is a primary cause of the pain the patient is experiencing, I also believe the shoulder is a contributing factor.     Patient prognosis is Good.   Patient will benefit from skilled outpatient Physical Therapy to address the deficits stated above and in the chart below, provide patient /family education, and to maximize patientt's level of independence.     Plan of care discussed with patient: Yes  Patient's spiritual, cultural and educational needs considered and patient is agreeable to the plan of care and goals as stated below:     Anticipated Barriers for therapy: none    Medical Necessity is demonstrated by the following  History  Co-morbidities and personal factors that may impact the plan of care Co-morbidities:   Past Medical History:   Diagnosis Date    GERD (gastroesophageal reflux disease)     Hyperlipidemia        Personal Factors:   no deficits     low   Examination  Body Structures and Functions, activity limitations and participation restrictions that may impact the plan of care Body Regions:   neck  upper extremities    Body Systems:    gross symmetry  ROM  strength  gross coordinated movement    Participation Restrictions:   Lifting weights, working    Activity limitations:   Learning and applying knowledge  no deficits    General Tasks and Commands  no deficits    Communication  no deficits    Mobility  lifting and carrying objects  fine hand use (grasping/picking up)    Self care  no deficits    Domestic Life  doing house work (cleaning house, washing dishes, laundry)    Interactions/Relationships  no deficits    Life Areas  no deficits    Community  and Social Life  community life  recreation and leisure         low   Clinical Presentation stable and uncomplicated low   Decision Making/ Complexity Score: low       GOALS: Short Term Goals: 4 weeks  1.Report decreased neck pain  <   / =  4  /10  to increase tolerance for working  2. Increase cervical ROM by 5-10 degrees in order to perform ADLs with decreased difficulty.  3. Increase strength in B shoulders/scapular stabilizers by 1/3 MMT grade to increase tolerance for ADL and work activities.  4. Pt to tolerate HEP to improve ROM and independence with ADL's    Long Term Goals: 8 weeks  1.Report decreased neck pain  <   / =  2  /10  to increase tolerance for working  2. Increase UE/neck flexibility in order to improve posture.    3.Increased strength in B shoulders/scapular stabilizers to >/= 4/5 MMT grade to increase tolerance for ADL and work activities.  4.  Pt will report at CJ level (20-40% impaired) on FOTO neck score for neck pain disability to demonstrate decrease in disability and improvement in neck pain.     PLAN   Plan of care Certification: 1/25/2023 to 4/25/23.    Outpatient Physical Therapy 2 times weekly for 6 weeks to include the following interventions: Cervical/Lumbar Traction, Electrical Stimulation  , Gait Training, Manual Therapy, Moist Heat/ Ice, Neuromuscular Re-ed, Patient Education, Therapeutic Activities, Therapeutic Exercise, and Ultrasound.     Elayne Sewell, PT      I CERTIFY THE NEED FOR THESE SERVICES FURNISHED UNDER THIS PLAN OF TREATMENT AND WHILE UNDER MY CARE   Physician's comments:     Physician's Signature: ___________________________________________________

## 2023-02-03 ENCOUNTER — HOSPITAL ENCOUNTER (OUTPATIENT)
Facility: OTHER | Age: 48
Discharge: HOME | End: 2023-02-03
Attending: ANESTHESIOLOGY | Admitting: ANESTHESIOLOGY
Payer: COMMERCIAL

## 2023-02-03 VITALS
DIASTOLIC BLOOD PRESSURE: 84 MMHG | RESPIRATION RATE: 16 BRPM | WEIGHT: 197 LBS | HEART RATE: 56 BPM | HEIGHT: 69 IN | SYSTOLIC BLOOD PRESSURE: 117 MMHG | TEMPERATURE: 98 F | OXYGEN SATURATION: 93 % | BODY MASS INDEX: 29.18 KG/M2

## 2023-02-03 DIAGNOSIS — M50.30 DDD (DEGENERATIVE DISC DISEASE), CERVICAL: ICD-10-CM

## 2023-02-03 DIAGNOSIS — G89.29 CHRONIC PAIN: ICD-10-CM

## 2023-02-03 DIAGNOSIS — M54.12 CERVICAL RADICULOPATHY: Primary | ICD-10-CM

## 2023-02-03 PROCEDURE — 25000003 PHARM REV CODE 250: Performed by: STUDENT IN AN ORGANIZED HEALTH CARE EDUCATION/TRAINING PROGRAM

## 2023-02-03 PROCEDURE — 62321 PR INJ CERV/THORAC, W/GUIDANCE: ICD-10-PCS | Mod: ,,, | Performed by: ANESTHESIOLOGY

## 2023-02-03 PROCEDURE — 25000003 PHARM REV CODE 250: Performed by: ANESTHESIOLOGY

## 2023-02-03 PROCEDURE — 63600175 PHARM REV CODE 636 W HCPCS: Performed by: ANESTHESIOLOGY

## 2023-02-03 PROCEDURE — 62321 NJX INTERLAMINAR CRV/THRC: CPT | Performed by: ANESTHESIOLOGY

## 2023-02-03 PROCEDURE — 25500020 PHARM REV CODE 255: Performed by: ANESTHESIOLOGY

## 2023-02-03 PROCEDURE — 62321 NJX INTERLAMINAR CRV/THRC: CPT | Mod: ,,, | Performed by: ANESTHESIOLOGY

## 2023-02-03 PROCEDURE — A4216 STERILE WATER/SALINE, 10 ML: HCPCS | Performed by: ANESTHESIOLOGY

## 2023-02-03 RX ORDER — MIDAZOLAM HYDROCHLORIDE 1 MG/ML
INJECTION INTRAMUSCULAR; INTRAVENOUS
Status: DISCONTINUED | OUTPATIENT
Start: 2023-02-03 | End: 2023-02-03 | Stop reason: HOSPADM

## 2023-02-03 RX ORDER — SODIUM CHLORIDE 9 MG/ML
INJECTION, SOLUTION INTRAMUSCULAR; INTRAVENOUS; SUBCUTANEOUS
Status: DISCONTINUED | OUTPATIENT
Start: 2023-02-03 | End: 2023-02-03 | Stop reason: HOSPADM

## 2023-02-03 RX ORDER — LIDOCAINE HYDROCHLORIDE 20 MG/ML
INJECTION, SOLUTION INFILTRATION; PERINEURAL
Status: DISCONTINUED | OUTPATIENT
Start: 2023-02-03 | End: 2023-02-03 | Stop reason: HOSPADM

## 2023-02-03 RX ORDER — LIDOCAINE HYDROCHLORIDE 10 MG/ML
INJECTION, SOLUTION EPIDURAL; INFILTRATION; INTRACAUDAL; PERINEURAL
Status: DISCONTINUED | OUTPATIENT
Start: 2023-02-03 | End: 2023-02-03 | Stop reason: HOSPADM

## 2023-02-03 RX ORDER — SODIUM CHLORIDE 9 MG/ML
500 INJECTION, SOLUTION INTRAVENOUS CONTINUOUS
Status: DISCONTINUED | OUTPATIENT
Start: 2023-02-03 | End: 2023-02-03 | Stop reason: HOSPADM

## 2023-02-03 RX ORDER — DEXAMETHASONE SODIUM PHOSPHATE 10 MG/ML
INJECTION INTRAMUSCULAR; INTRAVENOUS
Status: DISCONTINUED | OUTPATIENT
Start: 2023-02-03 | End: 2023-02-03 | Stop reason: HOSPADM

## 2023-02-03 NOTE — OP NOTE
Cervical Interlaminar Epidural Steroid Injection under Fluoroscopic Guidance    The procedure, risks, benefits, and options were discussed with the patient. There are no contraindications to the procedure. The patent expressed understanding and agreed to the procedure. Informed written consent was obtained prior to the start of the procedure and can be found in the patient's chart.     PATIENT NAME: James Peabody IV   MRN: [id number]     DATE OF PROCEDURE: 02/03/2023    PROCEDURE: Cervical Interlaminar Epidural Steroid Injection C7/T1 under Fluoroscopic Guidance    PRE-OP DIAGNOSIS: Cervical radiculopathy [M54.12] Cervical radiculopathy [M54.12]    POST-OP DIAGNOSIS: Same    PHYSICIAN: Cuauhtemoc Lyman MD     ASSISTANTS:   Daron Monae MD  [employer] Pain Fellow  Humble Frausto MD, [employer] Pain Fellow     MEDICATIONS INJECTED: Preservative-free Decadron 10mg with 2cc of Lidocaine 1% MPF and preservative free normal saline    LOCAL ANESTHETIC INJECTED: Xylocaine 2%     SEDATION: Versed 2mg and Fentanyl 0mcg                                                                                                                                                                                     Conscious sedation ordered by M.D. Patient re-evaluation prior to administration of conscious sedation. No changes noted in patient's status from initial evaluation. The patient's vital signs were monitored by RN and patient remained hemodynamically stable throughout the procedure.    Event Time In   Sedation Start 0817   Sedation End 0824       ESTIMATED BLOOD LOSS: None    COMPLICATIONS: None    TECHNIQUE: Time-out was performed to identify the patient and procedure to be performed. With the patient laying in a prone position, the surgical area was prepped and draped in the usual sterile fashion using ChloraPrep and a fenestrated drape. The level was determined under fluoroscopy guidance. Skin anesthesia was achieved by injecting Lidocaine 2%  over the injection site.  The interlaminar space was then approached with a 20 gauge, 3.5 inch Tuohy needle that was introduced under fluoroscopic guidance with AP, lateral and/or contralateral oblique imaging. Once the Ligamentum flavum was encountered loss of resistance to saline was used to enter the epidural space. With positive loss of resistance and negative aspiration for CSF or Blood, contrast dye  Omnipaque (300mg/mL) was injected to confirm placement and there was no vascular runoff. Then 3 mL of the medication mixture listed above was then injected slowly. Displacement of the radio opaque contrast after injection of the medication confirmed that the medication went into the area of the epidural space. The needles were removed, and bleeding was nil. A sterile dressing was applied. No specimens collected. The patient tolerated the procedure well.       The patient was monitored after the procedure in the recovery area. They were given post-procedure and discharge instructions to follow at home. The patient was discharged in a stable condition.      Cuauhtemoc Lyman MD

## 2023-02-03 NOTE — DISCHARGE SUMMARY
Discharge Note  Short Stay      SUMMARY     Admit Date: 2/3/2023    Attending Physician: Cuauhtemoc Lyman      Discharge Physician: Cuauhtemoc Lyman      Discharge Date: 2/3/2023 8:30 AM    Procedure(s) (LRB):  INJECTION, STEROID, EPIDURAL, C7/T1 CONTRAST DIRECT REF (N/A)    Final Diagnosis: Cervical radiculopathy [M54.12]    Disposition: Home or self care    Patient Instructions:   Current Discharge Medication List        CONTINUE these medications which have NOT CHANGED    Details   cetirizine (ZYRTEC) 10 MG tablet Take 10 mg by mouth daily as needed for Allergies.      cyclobenzaprine (FLEXERIL) 10 MG tablet Take 1 tablet (10 mg total) by mouth 3 (three) times daily as needed for Muscle spasms.  Qty: 60 tablet, Refills: 1    Associated Diagnoses: Cervical radiculopathy      gabapentin (NEURONTIN) 300 MG capsule TAKE 1 CAPSULE(300 MG) BY MOUTH THREE TIMES DAILY  Qty: 60 capsule, Refills: 1    Associated Diagnoses: Cervical radiculopathy      meloxicam (MOBIC) 15 MG tablet Take 1 tablet (15 mg total) by mouth once daily.  Qty: 60 tablet, Refills: 1    Associated Diagnoses: Cervical radiculopathy      omeprazole (PRILOSEC) 40 MG capsule Take 1 capsule (40 mg total) by mouth once daily.  Qty: 90 capsule, Refills: 3      valACYclovir (VALTREX) 1000 MG tablet Take 1 tablet (1,000 mg total) by mouth every 12 (twelve) hours. for 7 days  Qty: 14 tablet, Refills: 0    Associated Diagnoses: HSV infection                 Discharge Diagnosis: Cervical radiculopathy [M54.12]  Condition on Discharge: Stable with no complications to procedure   Diet on Discharge: Same as before.  Activity: as per instruction sheet.  Discharge to: Home with a responsible adult.  Follow up: 2-4 weeks

## 2023-02-03 NOTE — DISCHARGE INSTRUCTIONS
Thank you for allowing us to care for you today. You may receive a survey about the care we provided. Your feedback is valuable and helps us provide excellent care throughout the community.     Home Care Instructions for Pain Management:    1. DIET:   You may resume your normal diet today.   2. BATHING:   You may shower with luke warm water. No tub baths or anything that will soak injection sites under water for the next 24 hours.  3. DRESSING:   You may remove your bandage today.   4. ACTIVITY LEVEL:   You may resume your normal activities 24 hrs after your procedure. Nothing strenuous today.  5. MEDICATIONS:   You may resume your normal medications today. To restart blood thinners, ask your doctor.  6. DRIVING    If you have received any sedatives by mouth today, you may not drive for 12 hours.    If you have received any sedation through your IV, you may not drive for 24 hrs.   7. SPECIAL INSTRUCTIONS:   No heat to the injection site for 24 hrs including, hot bath or shower, heating pad, moist heat, or hot tubs.    Use ice pack to injection site for any pain or discomfort.  Apply ice packs for 20 minute intervals as needed.    IF you have diabetes, be sure to monitor your blood sugar more closely. IF your injection contained steroids your blood sugar levels may become higher than normal.    If you are still having pain upon discharge:  Your pain may improve over the next 48 hours. The anesthetic (numbing medication) works immediately to 48 hours. IF your injection contained a steroid (anti-inflammatory medication), it takes approximately 3 days to start feeling relief and 7-10 days to see your greatest results from the medication. It is possible you may need subsequent injections. This would be discussed at your follow up appointment with pain management or your referring doctor.    Please call the PAIN MANAGEMENT office at [phone] or ON CALL pager at [phone] if you experienced any:   -Weakness or  loss of sensation  -Fever > 101.5  -Pain uncontrolled with oral medications   -Persistent nausea, vomiting, or diarrhea  -Redness or drainage from the injection sites, or any other worrisome concerns.   If physician on call was not reached or could not communicate with our office for any reason please go to the nearest emergency department. Adult Procedural Sedation Instructions    Recovery After Procedural Sedation (Adult)  You have been given medicine by vein to make you sleep during your surgery. This may have included both a pain medicine and sleeping medicine. Most of the effects have worn off. But you may still have some drowsiness for the next 6 to 8 hours.  Home care  Follow these guidelines when you get home:  For the next 8 hours, you should be watched by a responsible adult. This person should make sure your condition is not getting worse.  Don't drink any alcohol for the next 24 hours.  Don't drive, operate dangerous machinery, or make important business or personal decisions during the next 24 hours.  Note: Your healthcare provider may tell you not to take any medicine by mouth for pain or sleep in the next 4 hours. These medicines may react with the medicines you were given in the hospital. This could cause a much stronger response than usual.  Follow-up care  Follow up with your healthcare provider if you are not alert and back to your usual level of activity within 12 hours.  When to seek medical advice  Call your healthcare provider right away if any of these occur:  Drowsiness gets worse  Weakness or dizziness gets worse  Repeated vomiting  You can't be awakened   Date Last Reviewed: 10/18/2016  © 4888-3374 [employer]. [address] [place] [address], [place] [address]. All rights reserved. This information is not intended as a substitute for professional medical care. Always follow your healthcare professional's instructions.

## 2023-02-07 ENCOUNTER — CLINICAL SUPPORT (OUTPATIENT)
Dept: REHABILITATION | Facility: HOSPITAL | Age: 48
End: 2023-02-07
Attending: ORTHOPAEDIC SURGERY
Payer: COMMERCIAL

## 2023-02-07 DIAGNOSIS — M62.81 MUSCLE WEAKNESS OF UPPER EXTREMITY: ICD-10-CM

## 2023-02-07 DIAGNOSIS — M54.12 CERVICAL RADICULOPATHY: Primary | ICD-10-CM

## 2023-02-07 PROCEDURE — 97112 NEUROMUSCULAR REEDUCATION: CPT | Mod: PO

## 2023-02-07 PROCEDURE — 97140 MANUAL THERAPY 1/> REGIONS: CPT | Mod: PO

## 2023-02-07 PROCEDURE — 97110 THERAPEUTIC EXERCISES: CPT | Mod: PO

## 2023-02-07 NOTE — PROGRESS NOTES
"  [facility]   Physical Therapy Treatment Note     Name: James Royden Peabody IV  Clinic Number: [id number]    Therapy Diagnosis:   Encounter Diagnoses   Name Primary?    Cervical radiculopathy Yes    Muscle weakness of upper extremity      Physician: Conrad Hill MD    Visit Date: 2/7/2023    Physician Orders: PT Eval and Treat   Medical Diagnosis from Referral: Cervical radiculopathy [M54.12]  Evaluation Date: 1/25/2023  Authorization Period Expiration: 12/31/23  Plan of Care Expiration: 4/25/23  Progress Note Due: 2/25/23  Visit # / Visits authorized: 1/ 40  FOTO: 1/3 - last completed on 2/7/23    PTA Visit #: 0/5     Precautions: Standard     Time In: 4:30  Time Out: 5:30  Total Billable Time: 60 minutes      SUBJECTIVE     Pt reports: had a steroid injection on Friday and has been able to sleep which has been great. Still has numbness in bilateral arms.   He was not provided with home exercise program.  Response to previous treatment: IE performed   Functional change: improved sleep    Pain: 5/10  Location: bilateral arm         OBJECTIVE       Objective measures taken at progress report unless specified otherwise.     TREATMENT       Tracie received the treatments listed below:       therapeutic exercises to develop strength, endurance, ROM, posture, and core stabilization for 38 minutes including:      Supine cervical rotations 10x5"  Supine C1-2 SNAG x10 B  Thoracic extension over large foam roll 10x5"  Open books 15x5"  Seated upper trunk rotations 15x3" B  Shoulder rows green theraband 3x10  Shoulder extensions green theraband 3x10  Shoulder shrugs (small movement) 20x3"      manual therapy techniques: Joint mobilizations, Manual traction, and Soft tissue Mobilization were applied for 14 minutes, including:  prone levator scap pin and stretch  Supine cervical distraction with suboccipital release  Hypervolt massage gun to infraspinatus, thoracic paraspinals, upper trap, and " "levator scap left.      neuromuscular re-education activities to improve: Coordination, Proprioception, and Posture for 8 minutes. The following activities were included:  Scap squeeze 30x3"   Supine shoulder flexion AROM with supination 20x3"  Seated cervical isometrics 10x5"     therapeutic activities to improve functional performance for 0  minutes, including:         PATIENT EDUCATION AND HOME EXERCISES     Home Exercises Provided and Patient Education Provided     Education provided:   - education on condition     Written Home Exercises Provided: emailed copy to [email or web address]. Exercises were reviewed and Tracie was able to demonstrate them prior to the end of the session.  Tracie demonstrated good  understanding of the education provided. See EMR under Patient Instructions for exercises provided during therapy sessions.    ASSESSMENT     Tracie presents today for first follow up visit with improvement in condition following steroid injection approx 5 days ago. He has very good tolerance to session with mild complaints of pinching at the medial border of the right scapula with certain exercises such as scap squeezes. May consider dry needling to the infraspinatus and/or levator scap for this in the future. Provided patient with green theraband for home use and emailed copy of home exercise program. Progress as tolerated.     Tracie Is progressing towards his goals.   Pt prognosis is Good.     Pt will continue to benefit from skilled outpatient physical therapy to address the deficits listed in the problem list box on initial evaluation, provide pt/family education and to maximize pt's level of independence in the home and community environment.     Pt's spiritual, cultural and educational needs considered and pt agreeable to plan of care and goals.     Anticipated barriers to physical therapy: chronicity of condition    GOALS: Short Term Goals: 4 weeks  1.Report decreased neck pain  <   / =  4  /10  to " increase tolerance for working  2. Increase cervical ROM by 5-10 degrees in order to perform ADLs with decreased difficulty.  3. Increase strength in B shoulders/scapular stabilizers by 1/3 MMT grade to increase tolerance for ADL and work activities.  4. Pt to tolerate HEP to improve ROM and independence with ADL's     Long Term Goals: 8 weeks  1.Report decreased neck pain  <   / =  2  /10  to increase tolerance for working  2. Increase UE/neck flexibility in order to improve posture.    3.Increased strength in B shoulders/scapular stabilizers to >/= 4/5 MMT grade to increase tolerance for ADL and work activities.  4.  Pt will report at CJ level (20-40% impaired) on FOTO neck score for neck pain disability to demonstrate decrease in disability and improvement in neck pain.     PLAN     Plan of care Certification: 1/25/2023 to 4/25/23.     Outpatient Physical Therapy 2 times weekly for 6 weeks to include the following interventions: Cervical/Lumbar Traction, Electrical Stimulation  , Gait Training, Manual Therapy, Moist Heat/ Ice, Neuromuscular Re-ed, Patient Education, Therapeutic Activities, Therapeutic Exercise, and Ultrasound.     Cont POC    Elayne Sewell, PT

## 2023-02-15 ENCOUNTER — CLINICAL SUPPORT (OUTPATIENT)
Dept: REHABILITATION | Facility: HOSPITAL | Age: 48
End: 2023-02-15
Attending: ORTHOPAEDIC SURGERY
Payer: COMMERCIAL

## 2023-02-15 DIAGNOSIS — M62.81 MUSCLE WEAKNESS OF UPPER EXTREMITY: ICD-10-CM

## 2023-02-15 DIAGNOSIS — M54.12 CERVICAL RADICULOPATHY: Primary | ICD-10-CM

## 2023-02-15 PROCEDURE — 97110 THERAPEUTIC EXERCISES: CPT | Mod: PO

## 2023-02-15 PROCEDURE — 97140 MANUAL THERAPY 1/> REGIONS: CPT | Mod: PO

## 2023-02-15 NOTE — PROGRESS NOTES
"  [facility]   Physical Therapy Treatment Note     Name: James Royden Peabody IV  Clinic Number: [id number]    Therapy Diagnosis:   Encounter Diagnoses   Name Primary?    Cervical radiculopathy Yes    Muscle weakness of upper extremity        Physician: Conrad Hill MD    Visit Date: 2/15/2023    Physician Orders: PT Eval and Treat   Medical Diagnosis from Referral: Cervical radiculopathy [M54.12]  Evaluation Date: 1/25/2023  Authorization Period Expiration: 12/31/23  Plan of Care Expiration: 4/25/23  Progress Note Due: 2/25/23  Visit # / Visits authorized: 2/ 40  FOTO: 1/3 - last completed on 2/7/23    PTA Visit #: 0/5     Precautions: Standard     Time In: 12:30   Time Out: 1:17  Total Billable Time: 47 minutes      SUBJECTIVE     Pt reports: back and arms have been hurting really bad. Feels like the neck is loosening up. Still has grinding feeling between the shoulders.   He was not provided with home exercise program.  Response to previous treatment: IE performed   Functional change: improved sleep    Pain: 7/10  Location: bilateral arm         OBJECTIVE       Objective measures taken at progress report unless specified otherwise.     TREATMENT       Tracie received the treatments listed below:       therapeutic exercises to develop strength, endurance, ROM, posture, and core stabilization for 38 minutes including:      Supine cervical rotations 10x5"  Supine C1-2 SNAG x10 B  Thoracic extension over large foam roll 10x5"  Open books 15x5"  +supine pec stretch on towel roll x3 min  Seated upper trunk rotations 15x3" B  +quadruped thread the needle x10 B  Shoulder rows green theraband 3x10  Shoulder extensions green theraband 3x10  Shoulder shrugs (small movement) 20x3"        manual therapy techniques: Joint mobilizations, Manual traction, and Soft tissue Mobilization were applied for 9 minutes, including:  prone levator scap pin and stretch  Supine cervical distraction with " "suboccipital release  Hypervolt massage gun to infraspinatus, thoracic paraspinals, upper trap, and levator scap left.      neuromuscular re-education activities to improve: Coordination, Proprioception, and Posture for 0 minutes. The following activities were included:  Scap squeeze 30x3"   Supine shoulder flexion AROM with supination 20x3"  Seated cervical isometrics 10x5"     therapeutic activities to improve functional performance for 0  minutes, including:         PATIENT EDUCATION AND HOME EXERCISES     Home Exercises Provided and Patient Education Provided     Education provided:   - education on condition     Written Home Exercises Provided: emailed copy to [email or web address]. Exercises were reviewed and Tracie was able to demonstrate them prior to the end of the session.  Tracie demonstrated good  understanding of the education provided. See EMR under Patient Instructions for exercises provided during therapy sessions.    ASSESSMENT     Tracie with reports of improvement in cervical spine range of motion however is feeling increased pain secondary to increased workload at his job. He remains with numbness in the arms at this time. We will continue to progress upper extremity strengthening and thoracic mobility.     Tracie Is progressing towards his goals.   Pt prognosis is Good.     Pt will continue to benefit from skilled outpatient physical therapy to address the deficits listed in the problem list box on initial evaluation, provide pt/family education and to maximize pt's level of independence in the home and community environment.     Pt's spiritual, cultural and educational needs considered and pt agreeable to plan of care and goals.     Anticipated barriers to physical therapy: chronicity of condition    GOALS: Short Term Goals: 4 weeks  1.Report decreased neck pain  <   / =  4  /10  to increase tolerance for working  2. Increase cervical ROM by 5-10 degrees in order to perform ADLs with decreased " difficulty.  3. Increase strength in B shoulders/scapular stabilizers by 1/3 MMT grade to increase tolerance for ADL and work activities.  4. Pt to tolerate HEP to improve ROM and independence with ADL's     Long Term Goals: 8 weeks  1.Report decreased neck pain  <   / =  2  /10  to increase tolerance for working  2. Increase UE/neck flexibility in order to improve posture.    3.Increased strength in B shoulders/scapular stabilizers to >/= 4/5 MMT grade to increase tolerance for ADL and work activities.  4.  Pt will report at CJ level (20-40% impaired) on FOTO neck score for neck pain disability to demonstrate decrease in disability and improvement in neck pain.     PLAN     Plan of care Certification: 1/25/2023 to 4/25/23.     Outpatient Physical Therapy 2 times weekly for 6 weeks to include the following interventions: Cervical/Lumbar Traction, Electrical Stimulation  , Gait Training, Manual Therapy, Moist Heat/ Ice, Neuromuscular Re-ed, Patient Education, Therapeutic Activities, Therapeutic Exercise, and Ultrasound.     Cont POC    Elayne Sewell, PT

## 2023-02-22 ENCOUNTER — CLINICAL SUPPORT (OUTPATIENT)
Dept: REHABILITATION | Facility: HOSPITAL | Age: 48
End: 2023-02-22
Attending: ORTHOPAEDIC SURGERY
Payer: COMMERCIAL

## 2023-02-22 DIAGNOSIS — M62.81 MUSCLE WEAKNESS OF UPPER EXTREMITY: ICD-10-CM

## 2023-02-22 DIAGNOSIS — M54.12 CERVICAL RADICULOPATHY: Primary | ICD-10-CM

## 2023-02-22 PROCEDURE — 97110 THERAPEUTIC EXERCISES: CPT | Mod: PO

## 2023-02-22 NOTE — PROGRESS NOTES
"  [facility]   Physical Therapy Treatment Note     Name: James Royden Peabody IV  Clinic Number: [id number]    Therapy Diagnosis:   Encounter Diagnoses   Name Primary?    Cervical radiculopathy Yes    Muscle weakness of upper extremity          Physician: Conrad Hill MD    Visit Date: 2/22/2023    Physician Orders: PT Eval and Treat   Medical Diagnosis from Referral: Cervical radiculopathy [M54.12]  Evaluation Date: 1/25/2023  Authorization Period Expiration: 12/31/23  Plan of Care Expiration: 4/25/23  Progress Note Due: 2/25/23  Visit # / Visits authorized: 3/ 40  FOTO: 1/3 - last completed on 2/7/23    PTA Visit #: 0/5     Precautions: Standard     Time In: 12:30   Time Out: 1:11  Total Billable Time: 41 minutes      SUBJECTIVE     Pt reports: took a few days from last therapy to recover.   He was not provided with home exercise program.  Response to previous treatment: sore after last week  Functional change: improved sleep    Pain: 7/10  Location: bilateral arm         OBJECTIVE       Objective measures taken at progress report unless specified otherwise.     TREATMENT       Tracie received the treatments listed below:       therapeutic exercises to develop strength, endurance, ROM, posture, and core stabilization for 41 minutes including:      Supine cervical rotations 10x5"  Supine C1-2 SNAG x10 B  Thoracic extension over large foam roll 10x5"  Open books 15x5"  supine pec stretch on towel roll x3 min (pain with activity)  Seated upper trunk rotations 15x3" B  +Levator scap stretch 3x30" B  quadruped thread the needle x10 B  Shoulder rows green theraband 3x10  Shoulder extensions green theraband 3x10  Shoulder shrugs (small movement) 20x3"     DKTC with swiss ball 20x5"  SKTC 10x5" B  LTR x20   Bridges 2x10  SLR 2x10 B       manual therapy techniques: Joint mobilizations, Manual traction, and Soft tissue Mobilization were applied for 0 minutes, including:  prone levator scap pin " "and stretch  Supine cervical distraction with suboccipital release  Hypervolt massage gun to infraspinatus, thoracic paraspinals, upper trap, and levator scap left.      neuromuscular re-education activities to improve: Coordination, Proprioception, and Posture for 0 minutes. The following activities were included:  Scap squeeze 30x3"   Supine shoulder flexion AROM with supination 20x3"  Seated cervical isometrics 10x5"     therapeutic activities to improve functional performance for 0  minutes, including:         PATIENT EDUCATION AND HOME EXERCISES     Home Exercises Provided and Patient Education Provided     Education provided:   - education on condition     Written Home Exercises Provided: emailed copy to [email or web address]. Exercises were reviewed and Tracie was able to demonstrate them prior to the end of the session.  Tracie demonstrated good  understanding of the education provided. See EMR under Patient Instructions for exercises provided during therapy sessions.    ASSESSMENT     Tracie returns to PT today with reports of soreness following last PT session that lingered for a few days. He is also having some lower back pain secondary to [place] weekend. He has tenderness over right levator scap at superior angle of scapula attachment. He was able to tolerate session well with no reports of increased pain. Continue to progress strengthening and range of motion as tolerated.     Tracie Is progressing towards his goals.   Pt prognosis is Good.     Pt will continue to benefit from skilled outpatient physical therapy to address the deficits listed in the problem list box on initial evaluation, provide pt/family education and to maximize pt's level of independence in the home and community environment.     Pt's spiritual, cultural and educational needs considered and pt agreeable to plan of care and goals.     Anticipated barriers to physical therapy: chronicity of condition    GOALS: Short Term Goals: 4 " weeks  1.Report decreased neck pain  <   / =  4  /10  to increase tolerance for working  2. Increase cervical ROM by 5-10 degrees in order to perform ADLs with decreased difficulty.  3. Increase strength in B shoulders/scapular stabilizers by 1/3 MMT grade to increase tolerance for ADL and work activities.  4. Pt to tolerate HEP to improve ROM and independence with ADL's     Long Term Goals: 8 weeks  1.Report decreased neck pain  <   / =  2  /10  to increase tolerance for working  2. Increase UE/neck flexibility in order to improve posture.    3.Increased strength in B shoulders/scapular stabilizers to >/= 4/5 MMT grade to increase tolerance for ADL and work activities.  4.  Pt will report at CJ level (20-40% impaired) on FOTO neck score for neck pain disability to demonstrate decrease in disability and improvement in neck pain.     PLAN     Plan of care Certification: 1/25/2023 to 4/25/23.     Outpatient Physical Therapy 2 times weekly for 6 weeks to include the following interventions: Cervical/Lumbar Traction, Electrical Stimulation  , Gait Training, Manual Therapy, Moist Heat/ Ice, Neuromuscular Re-ed, Patient Education, Therapeutic Activities, Therapeutic Exercise, and Ultrasound.     Cont POC    Elayne Sewell, PT

## 2023-02-24 ENCOUNTER — CLINICAL SUPPORT (OUTPATIENT)
Dept: REHABILITATION | Facility: HOSPITAL | Age: 48
End: 2023-02-24
Attending: ORTHOPAEDIC SURGERY
Payer: COMMERCIAL

## 2023-02-24 DIAGNOSIS — M54.12 CERVICAL RADICULOPATHY: Primary | ICD-10-CM

## 2023-02-24 DIAGNOSIS — M62.81 MUSCLE WEAKNESS OF UPPER EXTREMITY: ICD-10-CM

## 2023-02-24 PROCEDURE — 97110 THERAPEUTIC EXERCISES: CPT | Mod: PO

## 2023-02-24 NOTE — PROGRESS NOTES
"  [facility]   Physical Therapy Treatment Note     Name: James Royden Peabody IV  Clinic Number: [id number]    Therapy Diagnosis:   Encounter Diagnoses   Name Primary?    Cervical radiculopathy Yes    Muscle weakness of upper extremity            Physician: Conrad Hill MD    Visit Date: 2/24/2023    Physician Orders: PT Eval and Treat   Medical Diagnosis from Referral: Cervical radiculopathy [M54.12]  Evaluation Date: 1/25/2023  Authorization Period Expiration: 12/31/23  Plan of Care Expiration: 4/25/23  Progress Note Due: 2/25/23  Visit # / Visits authorized: 3/ 40  FOTO: 1/3 - last completed on 2/7/23    PTA Visit #: 0/5     Precautions: Standard     Time In: 11:15   Time Out: 12:00  Total Billable Time: 45 minutes      SUBJECTIVE     Pt reports: took a few days from last therapy to recover.   He was not provided with home exercise program.  Response to previous treatment: sore after last week  Functional change: improved sleep    Pain: 7/10  Location: bilateral arm         OBJECTIVE       Objective measures taken at progress report unless specified otherwise.     TREATMENT       Tracie received the treatments listed below:       therapeutic exercises to develop strength, endurance, ROM, posture, and core stabilization for 38 minutes including:      Supine cervical rotations 10x5"  Supine C1-2 SNAG x10 B  Thoracic extension over large foam roll 10x5"  Open books 15x5"  supine pec stretch on towel roll x3 min (pain with activity)  Seated upper trunk rotations 15x3" B  Levator scap stretch 3x30" B  quadruped thread the needle x10 B  Shoulder rows green theraband 3x10  Shoulder extensions green theraband 3x10  Shoulder shrugs (small movement) 20x3"       SKTC 10x5" B  LTR x20 - can feel this exercise in the shoulder  Hooklying adduction isometric 20x3"   Hip abduction isometric with pilates ring 20x3"  Posterior pelvic tilt 20x3"   Sidelying clamshells no resistance 2x10 B  Bridges " "2x10  SLR 2x10 B       manual therapy techniques: Joint mobilizations, Manual traction, and Soft tissue Mobilization were applied for 7 minutes, including:  prone levator scap pin and stretch  Supine cervical distraction with suboccipital release  Hypervolt massage gun to infraspinatus, thoracic paraspinals, upper trap, and levator scap left.       Patient provided written and verbal consent to receive functional dry needling at today's visit (see consent form scanned into chart). FDN performed to right levator scap using 30mm needle inserted approximately nursing home following rib angle. Pt was positioned in sidelying with horizontal adduction and scapular retraction to allow superior angle of scapula to lift for easy identification. FDN performed to reduce pain and muscle tension, promote blood flow, and improve ROM and function x 7 minutes (no charge). Pt tolerated tx well without adverse effects. Tracie was educated on what to expect following the procedure and he verbalized understanding.      neuromuscular re-education activities to improve: Coordination, Proprioception, and Posture for 0 minutes. The following activities were included:  Scap squeeze 30x3"   Supine shoulder flexion AROM with supination 20x3"  Seated cervical isometrics 10x5"     therapeutic activities to improve functional performance for 0  minutes, including:         PATIENT EDUCATION AND HOME EXERCISES     Home Exercises Provided and Patient Education Provided     Education provided:   - education on condition     Written Home Exercises Provided: emailed copy to [email or web address]. Exercises were reviewed and Tracie was able to demonstrate them prior to the end of the session.  Tracie demonstrated good  understanding of the education provided. See EMR under Patient Instructions for exercises provided during therapy sessions.    ASSESSMENT     Tracie with several days of soreness in the low back following last PT session. Today we started with " hypervolt massage gun followed by a different set of exercises and will assess response at next visit. We also finished session with dry needling noted above with expected twitch response and no pain afterwards.     Tracie Is progressing towards his goals.   Pt prognosis is Good.     Pt will continue to benefit from skilled outpatient physical therapy to address the deficits listed in the problem list box on initial evaluation, provide pt/family education and to maximize pt's level of independence in the home and community environment.     Pt's spiritual, cultural and educational needs considered and pt agreeable to plan of care and goals.     Anticipated barriers to physical therapy: chronicity of condition    GOALS: Short Term Goals: 4 weeks   1.Report decreased neck pain  <   / =  4  /10  to increase tolerance for working  2. Increase cervical ROM by 5-10 degrees in order to perform ADLs with decreased difficulty.  3. Increase strength in B shoulders/scapular stabilizers by 1/3 MMT grade to increase tolerance for ADL and work activities.  4. Pt to tolerate HEP to improve ROM and independence with ADL's     Long Term Goals: 8 weeks  1.Report decreased neck pain  <   / =  2  /10  to increase tolerance for working  2. Increase UE/neck flexibility in order to improve posture.    3.Increased strength in B shoulders/scapular stabilizers to >/= 4/5 MMT grade to increase tolerance for ADL and work activities.  4.  Pt will report at CJ level (20-40% impaired) on FOTO neck score for neck pain disability to demonstrate decrease in disability and improvement in neck pain.     PLAN     Plan of care Certification: 1/25/2023 to 4/25/23.     Outpatient Physical Therapy 2 times weekly for 6 weeks to include the following interventions: Cervical/Lumbar Traction, Electrical Stimulation  , Gait Training, Manual Therapy, Moist Heat/ Ice, Neuromuscular Re-ed, Patient Education, Therapeutic Activities, Therapeutic Exercise, and  Ultrasound.     Cont POC    Elayne Sewell, PT

## 2023-02-28 ENCOUNTER — CLINICAL SUPPORT (OUTPATIENT)
Dept: REHABILITATION | Facility: HOSPITAL | Age: 48
End: 2023-02-28
Attending: ORTHOPAEDIC SURGERY
Payer: COMMERCIAL

## 2023-02-28 DIAGNOSIS — M62.81 MUSCLE WEAKNESS OF UPPER EXTREMITY: ICD-10-CM

## 2023-02-28 DIAGNOSIS — M54.12 CERVICAL RADICULOPATHY: Primary | ICD-10-CM

## 2023-02-28 PROCEDURE — 97112 NEUROMUSCULAR REEDUCATION: CPT | Mod: PO

## 2023-02-28 PROCEDURE — 97110 THERAPEUTIC EXERCISES: CPT | Mod: PO

## 2023-02-28 NOTE — PROGRESS NOTES
"  [facility]   Physical Therapy Treatment Note     Name: James Royden Peabody IV  Clinic Number: [id number]    Therapy Diagnosis:   Encounter Diagnoses   Name Primary?    Cervical radiculopathy Yes    Muscle weakness of upper extremity              Physician: Conrad Hill MD    Visit Date: 2/28/2023    Physician Orders: PT Eval and Treat   Medical Diagnosis from Referral: Cervical radiculopathy [M54.12]  Evaluation Date: 1/25/2023  Authorization Period Expiration: 12/31/23  Plan of Care Expiration: 4/25/23  Progress Note Due: 2/25/23  Visit # / Visits authorized: 4/ 40  FOTO: 1/3 - last completed on 2/7/23    PTA Visit #: 0/5     Precautions: Standard     Time In: 5:30  Time Out: 6:21   Total Billable Time: 51 minutes      SUBJECTIVE     Pt reports: no better, no worse   He was not provided with home exercise program.  Response to previous treatment: sore after last week  Functional change: improved sleep    Pain: 7/10  Location: bilateral arm         OBJECTIVE       Objective measures taken at progress report unless specified otherwise.     Tingling into the medial 3 fingers recreated by pressure over cubital tunnel on R arm    TREATMENT       Tracie received the treatments listed below:       therapeutic exercises to develop strength, endurance, ROM, posture, and core stabilization for 41 minutes including:      Supine cervical rotations 10x5"  Supine C1-2 SNAG x10 B  Thoracic extension over large foam roll 10x5"  Open books 15x5"  Recumbent chin tucks with ball behind head 20x3"   supine pec stretch on towel roll x3 min (pain with activity)  Seated upper trunk rotations 15x3" B  Levator scap stretch 3x30" B  quadruped thread the needle x10 B (pain with activity)  Shoulder rows green theraband 3x10  Shoulder extensions green theraband 3x10        SKTC 10x5" B  LTR x20   Hooklying adduction isometric 20x3"   Hip abduction isometric with pilates ring 20x3"  Posterior pelvic tilt 20x3" " "  Sidelying clamshells no resistance 2x10 B  Bridges 2x10  SLR 2x10 B       manual therapy techniques: Joint mobilizations, Manual traction, and Soft tissue Mobilization were applied for 0 minutes, including:  prone levator scap pin and stretch  Supine cervical distraction with suboccipital release  Hypervolt massage gun to infraspinatus, thoracic paraspinals, upper trap, and levator scap left.       Patient provided written and verbal consent to receive functional dry needling at today's visit (see consent form scanned into chart). FDN performed to right levator scap using 30mm needle inserted approximately half-way following rib angle. Pt was positioned in sidelying with horizontal adduction and scapular retraction to allow superior angle of scapula to lift for easy identification. FDN performed to reduce pain and muscle tension, promote blood flow, and improve ROM and function x 7 minutes (no charge). Pt tolerated tx well without adverse effects. Tracie was educated on what to expect following the procedure and he verbalized understanding. - NP today     neuromuscular re-education activities to improve: Coordination, Proprioception, and Posture for 10 minutes. The following activities were included:  Scap squeeze 30x3"   Upper trap level 2 20x5"  Thoracic extension seated in gold chair 15x5"  Supine shoulder flexion AROM with supination 20x3"  Seated cervical isometrics 10x5"     therapeutic activities to improve functional performance for 0  minutes, including:         PATIENT EDUCATION AND HOME EXERCISES     Home Exercises Provided and Patient Education Provided     Education provided:   - education on condition     Written Home Exercises Provided: emailed copy to [email or web address]. Exercises were reviewed and Lilliedaniel was able to demonstrate them prior to the end of the session.  Lilliedaniel demonstrated good  understanding of the education provided. See EMR under Patient Instructions for exercises provided during " therapy sessions.    ASSESSMENT     Tracie continues with tingling into the right medial 3 fingers especially with pressure applied over the cubital tunnel. He is tolerating exercises well and had less soreness following last visit as compared to previous. We will continue to encourage cervical and thoracic mobility as well as postural re-education although he does endorse that he is constantly in a hunched position for work as he does  type tasks regularly.     Tracie Is progressing towards his goals.   Pt prognosis is Good.     Pt will continue to benefit from skilled outpatient physical therapy to address the deficits listed in the problem list box on initial evaluation, provide pt/family education and to maximize pt's level of independence in the home and community environment.     Pt's spiritual, cultural and educational needs considered and pt agreeable to plan of care and goals.     Anticipated barriers to physical therapy: chronicity of condition    GOALS: Short Term Goals: 4 weeks   1.Report decreased neck pain  <   / =  4  /10  to increase tolerance for working  2. Increase cervical ROM by 5-10 degrees in order to perform ADLs with decreased difficulty.  3. Increase strength in B shoulders/scapular stabilizers by 1/3 MMT grade to increase tolerance for ADL and work activities.  4. Pt to tolerate HEP to improve ROM and independence with ADL's     Long Term Goals: 8 weeks  1.Report decreased neck pain  <   / =  2  /10  to increase tolerance for working  2. Increase UE/neck flexibility in order to improve posture.    3.Increased strength in B shoulders/scapular stabilizers to >/= 4/5 MMT grade to increase tolerance for ADL and work activities.  4.  Pt will report at CJ level (20-40% impaired) on FOTO neck score for neck pain disability to demonstrate decrease in disability and improvement in neck pain.     PLAN     Plan of care Certification: 1/25/2023 to 4/25/23.     Outpatient Physical Therapy 2  times weekly for 6 weeks to include the following interventions: Cervical/Lumbar Traction, Electrical Stimulation  , Gait Training, Manual Therapy, Moist Heat/ Ice, Neuromuscular Re-ed, Patient Education, Therapeutic Activities, Therapeutic Exercise, and Ultrasound.     Cont POC    Elayne Sewell, PT

## 2023-03-09 ENCOUNTER — CLINICAL SUPPORT (OUTPATIENT)
Dept: REHABILITATION | Facility: HOSPITAL | Age: 48
End: 2023-03-09
Attending: ORTHOPAEDIC SURGERY
Payer: COMMERCIAL

## 2023-03-09 ENCOUNTER — OFFICE VISIT (OUTPATIENT)
Dept: ORTHOPEDICS | Facility: CLINIC | Age: 48
End: 2023-03-09
Payer: COMMERCIAL

## 2023-03-09 VITALS — WEIGHT: 199.63 LBS | BODY MASS INDEX: 29.57 KG/M2 | HEIGHT: 69 IN

## 2023-03-09 DIAGNOSIS — M54.12 CERVICAL RADICULOPATHY: Primary | ICD-10-CM

## 2023-03-09 DIAGNOSIS — M50.30 DDD (DEGENERATIVE DISC DISEASE), CERVICAL: ICD-10-CM

## 2023-03-09 DIAGNOSIS — M47.812 CERVICAL SPONDYLOSIS: ICD-10-CM

## 2023-03-09 DIAGNOSIS — M62.81 MUSCLE WEAKNESS OF UPPER EXTREMITY: ICD-10-CM

## 2023-03-09 PROCEDURE — 3008F PR BODY MASS INDEX (BMI) DOCUMENTED: ICD-10-PCS | Mod: CPTII,S$GLB,, | Performed by: ORTHOPAEDIC SURGERY

## 2023-03-09 PROCEDURE — 99214 PR OFFICE/OUTPT VISIT, EST, LEVL IV, 30-39 MIN: ICD-10-PCS | Mod: S$GLB,,, | Performed by: ORTHOPAEDIC SURGERY

## 2023-03-09 PROCEDURE — 99214 OFFICE O/P EST MOD 30 MIN: CPT | Mod: S$GLB,,, | Performed by: ORTHOPAEDIC SURGERY

## 2023-03-09 PROCEDURE — 1160F RVW MEDS BY RX/DR IN RCRD: CPT | Mod: CPTII,S$GLB,, | Performed by: ORTHOPAEDIC SURGERY

## 2023-03-09 PROCEDURE — 1159F MED LIST DOCD IN RCRD: CPT | Mod: CPTII,S$GLB,, | Performed by: ORTHOPAEDIC SURGERY

## 2023-03-09 PROCEDURE — 97140 MANUAL THERAPY 1/> REGIONS: CPT | Mod: PO

## 2023-03-09 PROCEDURE — 99999 PR PBB SHADOW E&M-EST. PATIENT-LVL III: CPT | Mod: PBBFAC,,, | Performed by: ORTHOPAEDIC SURGERY

## 2023-03-09 PROCEDURE — 97110 THERAPEUTIC EXERCISES: CPT | Mod: PO

## 2023-03-09 PROCEDURE — 99999 PR PBB SHADOW E&M-EST. PATIENT-LVL III: ICD-10-PCS | Mod: PBBFAC,,, | Performed by: ORTHOPAEDIC SURGERY

## 2023-03-09 PROCEDURE — 1160F PR REVIEW ALL MEDS BY PRESCRIBER/CLIN PHARMACIST DOCUMENTED: ICD-10-PCS | Mod: CPTII,S$GLB,, | Performed by: ORTHOPAEDIC SURGERY

## 2023-03-09 PROCEDURE — 3008F BODY MASS INDEX DOCD: CPT | Mod: CPTII,S$GLB,, | Performed by: ORTHOPAEDIC SURGERY

## 2023-03-09 PROCEDURE — 1159F PR MEDICATION LIST DOCUMENTED IN MEDICAL RECORD: ICD-10-PCS | Mod: CPTII,S$GLB,, | Performed by: ORTHOPAEDIC SURGERY

## 2023-03-09 RX ORDER — MELOXICAM 15 MG/1
15 TABLET ORAL DAILY
Qty: 60 TABLET | Refills: 1 | Status: SHIPPED | OUTPATIENT
Start: 2023-03-09 | End: 2023-07-10 | Stop reason: SDUPTHER

## 2023-03-09 NOTE — PROGRESS NOTES
"  [facility]   Physical Therapy Treatment Note     Name: James Royden Peabody IV  Clinic Number: [id number]    Therapy Diagnosis:   Encounter Diagnoses   Name Primary?    Cervical radiculopathy Yes    Muscle weakness of upper extremity        Physician: Conrad Hill MD    Visit Date: 3/9/2023    Physician Orders: PT Eval and Treat   Medical Diagnosis from Referral: Cervical radiculopathy [M54.12]  Evaluation Date: 1/25/2023  Authorization Period Expiration: 12/31/23  Plan of Care Expiration: 4/25/23  Progress Note Due: 2/25/23  Visit # / Visits authorized: 4/ 40  FOTO: 1/3 - last completed on 2/7/23    PTA Visit #: 0/5     Precautions: Standard     Time In: 5:05 pm  Time Out: 545 pm  Total Billable Time: 40 minutes MT 3, TE 1      SUBJECTIVE     Pt reports: 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  He was not provided with home exercise program.  Response to previous treatment: sore after last week  Functional change: improved sleep    Pain: 6/10 pre and 2/10 post session   Location: bilateral arm         OBJECTIVE       Objective measures taken at progress report unless specified otherwise.     Tingling into the medial 3 fingers recreated by pressure over cubital tunnel on R arm    TREATMENT       Tracie received the treatments listed below:       therapeutic exercises to develop strength, endurance, ROM, posture, and core stabilization for 10 minutes including:      Supine cervical rotations 10x5"  Supine C1-2 SNAG x10 B  Thoracic extension over large foam roll 10x5"  Open books 15x5"  Recumbent chin tucks with ball behind head 20x3"   supine pec stretch on 3" towel roll x3 min (today no pain with activity)  Supine horizontal 3" towel roll x 3 min for thoracic extension.   Seated upper trunk rotations 15x3" B  Levator scap stretch 3x30" B  quadruped thread the needle x10 B (pain with activity)  Shoulder rows green theraband 3x10  Shoulder extensions green theraband " "3x10        SKTC 10x5" B  LTR x20   Hooklying adduction isometric 20x3"   Hip abduction isometric with pilates ring 20x3"  Posterior pelvic tilt 20x3"   Sidelying clamshells no resistance 2x10 B  Bridges 2x10  SLR 2x10 B       manual therapy techniques: Joint mobilizations, Manual traction, and Soft tissue Mobilization were applied for 30 minutes, including:  prone levator scap pin and stretch  Supine cervical distraction with suboccipital release  Hypervolt massage gun vs MFR/STM to pectoralis minor, subclavius, teres minor infraspinatus, thoracic paraspinals, upper trap, and levator scap left.   PA and UPFA T2-T7 grade II/III  AC and SC multi-directional  mobs grade II/III      Patient provided written and verbal consent to receive functional dry needling at today's visit (see consent form scanned into chart). FDN performed to right levator scap using 30mm needle inserted approximately FDC following rib angle. Pt was positioned in sidelying with horizontal adduction and scapular retraction to allow superior angle of scapula to lift for easy identification. FDN performed to reduce pain and muscle tension, promote blood flow, and improve ROM and function x 7 minutes (no charge). Pt tolerated tx well without adverse effects. Tracie was educated on what to expect following the procedure and he verbalized understanding. - NP today     neuromuscular re-education activities to improve: Coordination, Proprioception, and Posture for 0 minutes. The following activities were included:  Scap squeeze 30x3"   Upper trap level 2 20x5"  Thoracic extension seated in gold chair 15x5"  Supine shoulder flexion AROM with supination 20x3"  Seated cervical isometrics 10x5"     therapeutic activities to improve functional performance for 0  minutes, including:         PATIENT EDUCATION AND HOME EXERCISES     Home Exercises Provided and Patient Education Provided     Education provided:   - education on condition     Written Home " Exercises Provided: emailed copy to [email or web address]. Exercises were reviewed and Tracie was able to demonstrate them prior to the end of the session.  Tracie demonstrated good  understanding of the education provided. See EMR under Patient Instructions for exercises provided during therapy sessions.    ASSESSMENT     Tracie with good relief after session with increased thoracic mobility and tolerated towel roll stretches into retraction and into thoracic extension to improve thoracic mobility and B shoulder activity with less overall pain.     Tracie Is progressing towards his goals.   Pt prognosis is Good.     Pt will continue to benefit from skilled outpatient physical therapy to address the deficits listed in the problem list box on initial evaluation, provide pt/family education and to maximize pt's level of independence in the home and community environment.     Pt's spiritual, cultural and educational needs considered and pt agreeable to plan of care and goals.     Anticipated barriers to physical therapy: chronicity of condition    GOALS: Short Term Goals: 4 weeks   1.Report decreased neck pain  <   / =  4  /10  to increase tolerance for working  2. Increase cervical ROM by 5-10 degrees in order to perform ADLs with decreased difficulty.  3. Increase strength in B shoulders/scapular stabilizers by 1/3 MMT grade to increase tolerance for ADL and work activities.  4. Pt to tolerate HEP to improve ROM and independence with ADL's     Long Term Goals: 8 weeks  1.Report decreased neck pain  <   / =  2  /10  to increase tolerance for working  2. Increase UE/neck flexibility in order to improve posture.    3.Increased strength in B shoulders/scapular stabilizers to >/= 4/5 MMT grade to increase tolerance for ADL and work activities.  4.  Pt will report at CJ level (20-40% impaired) on FOTO neck score for neck pain disability to demonstrate decrease in disability and improvement in neck pain.     PLAN     Plan of  care Certification: 1/25/2023 to 4/25/23.     Outpatient Physical Therapy 2 times weekly for 6 weeks to include the following interventions: Cervical/Lumbar Traction, Electrical Stimulation  , Gait Training, Manual Therapy, Moist Heat/ Ice, Neuromuscular Re-ed, Patient Education, Therapeutic Activities, Therapeutic Exercise, and Ultrasound.     Cont POC    Alyssa Harrell, PT

## 2023-03-09 NOTE — PROGRESS NOTES
"DATE: 3/9/2023  PATIENT: James Royden Peabody IV    Attending Physician: Conrad Hill M.D.    HISTORY:  James Royden Peabody IV is a 47 y.o. male w/ a hx of R CTS who returns to me today for FU after C7-T1 GER, which did not help at all. Patient continues to have neck pain and BUE pain that radiates to the fingers. He still has numbness in R ulnar 3 fingers and L radial 3 fingers. He has been taking meds and doing home PT exercises with some relief. Patient would like to continue conservative management    The patient smokes 1ppd for 30 years; he does not have DM or endorse IVDU. The patient is not on any blood thinners and does not take chronic narcotics. He works on [email or web address].    PMH/PSH/FamHx/SocHx:  Unchanged from prior visit    ROS:  Positive for neck and LUE pain  Denies myelopathic symptoms, perineal paresthesias, bowel or bladder incontinence    EXAM:  Ht 5' 9" (1.753 m)   Wt 90.5 kg (199 lb 10 oz)   BMI 29.48 kg/m²     My physical examination was notable for the following findings: motor intact BUE; SILT. + b/l De La Fuente's    IMAGING:  Today I independently reviewed the following images and my interpretations are as follows:    Previous AP and Lat upright C-spine films showed cervical spondylosis and DDD. No listhesis.    MRI cervical showed no significant central stenosis. Severe C5-7 bilateral foraminal stenosis. Mild-mod C4-5 b/l (R>L) foraminal stenosis.    EMG BUE on 1/19/23 ([facility]) showed R C6 and C7 radiculopathy.    ASSESSMENT/PLAN:  Patient has cervical foraminal stenosis and BUE radiculopathy. I educated the patient on the importance of core/back strengthening, correct posture, bending/lifting ergonomics, and low-impact aerobic exercises (walking, elliptical, and aquatherapy). Continue medications and exercises/PT. RTC PRN. Patient is a candidate for C5-7 ACDF if he fails conservative management.    Conrad Hill MD  Orthopaedic Spine Surgeon  Department of Orthopaedic " Surgery  [phone]

## 2023-03-13 ENCOUNTER — CLINICAL SUPPORT (OUTPATIENT)
Dept: REHABILITATION | Facility: HOSPITAL | Age: 48
End: 2023-03-13
Attending: ORTHOPAEDIC SURGERY
Payer: COMMERCIAL

## 2023-03-13 DIAGNOSIS — M54.12 CERVICAL RADICULOPATHY: Primary | ICD-10-CM

## 2023-03-13 DIAGNOSIS — M62.81 MUSCLE WEAKNESS OF UPPER EXTREMITY: ICD-10-CM

## 2023-03-13 PROCEDURE — 97140 MANUAL THERAPY 1/> REGIONS: CPT | Mod: PO

## 2023-03-13 PROCEDURE — 97110 THERAPEUTIC EXERCISES: CPT | Mod: PO

## 2023-03-13 NOTE — PROGRESS NOTES
"  [facility]   Physical Therapy Treatment Note     Name: James Royden Peabody IV  Clinic Number: [id number]    Therapy Diagnosis:   No diagnosis found.      Physician: Conrad Hill MD    Visit Date: 3/13/2023    Physician Orders: PT Eval and Treat   Medical Diagnosis from Referral: Cervical radiculopathy [M54.12]  Evaluation Date: 1/25/2023  Authorization Period Expiration: 12/31/23  Plan of Care Expiration: 4/25/23  Progress Note Due: 2/25/23  Visit # / Visits authorized: 4/ 40  FOTO: 1/3 - last completed on 2/7/23    PTA Visit #: 0/5     Precautions: Standard     Time In: 418 pm  Time Out: 510 pm  Total Billable Time: 52 minutes MT 3, TE 1      SUBJECTIVE     Pt reports: one good day of relief but limited by cold this weekend and less work this weekend.   He was not provided with home exercise program.  Response to previous treatment: sore after last week with one full day of relief.   Functional change: improved sleep    Pain: 4-5/10 pre and 2/10 post session   Location: bilateral arm         OBJECTIVE       Objective measures taken at progress report unless specified otherwise.     Tingling into the medial 3 fingers recreated by pressure over cubital tunnel on R arm    TREATMENT       Tracie received the treatments listed below:       therapeutic exercises to develop strength, endurance, ROM, posture, and core stabilization for 15 minutes including:      Supine cervical rotations 10x5"  Supine C1-2 SNAG x10 B  Thoracic extension over large foam roll 10x5"  Open books 15x5"  Recumbent chin tucks with ball behind head 20x3"   supine pec stretch on 3" towel roll x3 min (today no pain with activity) + horizontal abduction with green Tband and then diagonals alternating x 30  Supine horizontal 3" towel roll x 3 min for thoracic extension.  + shoulder flexion with 8# dowel x 30  Seated upper trunk rotations 15x3" B  Levator scap stretch 3x30" B  quadruped thread the needle x10 B (pain with " "activity)  Shoulder rows green theraband 3x10  Shoulder extensions green theraband 3x10        SKTC 10x5" B  LTR x20   Hooklying adduction isometric 20x3"   Hip abduction isometric with pilates ring 20x3"  Posterior pelvic tilt 20x3"   Sidelying clamshells no resistance 2x10 B  Bridges 2x10  SLR 2x10 B       manual therapy techniques: Joint mobilizations, Manual traction, and Soft tissue Mobilization were applied for 37 minutes, including:  prone levator scap pin and stretch  Supine cervical distraction with suboccipital release  Hypervolt massage gun vs MFR/STM to pectoralis minor, subclavius, teres minor infraspinatus, thoracic paraspinals, upper trap, and levator scap left.   PA and UPFA T2-T7 grade II/III  AC and SC multi-directional  mobs grade II/III      Patient provided written and verbal consent to receive functional dry needling at today's visit (see consent form scanned into chart). FDN performed to right levator scap using 30mm needle inserted approximately FDC following rib angle. Pt was positioned in sidelying with horizontal adduction and scapular retraction to allow superior angle of scapula to lift for easy identification. FDN performed to reduce pain and muscle tension, promote blood flow, and improve ROM and function x 7 minutes (no charge). Pt tolerated tx well without adverse effects. Tracie was educated on what to expect following the procedure and he verbalized understanding. - NP today     neuromuscular re-education activities to improve: Coordination, Proprioception, and Posture for 0 minutes. The following activities were included:  Scap squeeze 30x3"   Upper trap level 2 20x5"  Thoracic extension seated in gold chair 15x5"  Supine shoulder flexion AROM with supination 20x3"  Seated cervical isometrics 10x5"     therapeutic activities to improve functional performance for 0  minutes, including:         PATIENT EDUCATION AND HOME EXERCISES     Home Exercises Provided and Patient " Education Provided     Education provided:   - education on condition     Written Home Exercises Provided: emailed copy to [email or web address]. Exercises were reviewed and Tracie was able to demonstrate them prior to the end of the session.  Tracie demonstrated good  understanding of the education provided. See EMR under Patient Instructions for exercises provided during therapy sessions.    ASSESSMENT     Tracie with good relief after session with increased thoracic mobility and good carryover still on arrival today but increased aching with recent cold this weekend but had one good day of relief and overall good relief since last visit.     Tracie Is progressing towards his goals.   Pt prognosis is Good.     Pt will continue to benefit from skilled outpatient physical therapy to address the deficits listed in the problem list box on initial evaluation, provide pt/family education and to maximize pt's level of independence in the home and community environment.     Pt's spiritual, cultural and educational needs considered and pt agreeable to plan of care and goals.     Anticipated barriers to physical therapy: chronicity of condition    GOALS: Short Term Goals: 4 weeks   1.Report decreased neck pain  <   / =  4  /10  to increase tolerance for working  2. Increase cervical ROM by 5-10 degrees in order to perform ADLs with decreased difficulty.  3. Increase strength in B shoulders/scapular stabilizers by 1/3 MMT grade to increase tolerance for ADL and work activities.  4. Pt to tolerate HEP to improve ROM and independence with ADL's     Long Term Goals: 8 weeks  1.Report decreased neck pain  <   / =  2  /10  to increase tolerance for working  2. Increase UE/neck flexibility in order to improve posture.    3.Increased strength in B shoulders/scapular stabilizers to >/= 4/5 MMT grade to increase tolerance for ADL and work activities.  4.  Pt will report at CJ level (20-40% impaired) on FOTO neck score for neck pain  disability to demonstrate decrease in disability and improvement in neck pain.     PLAN     Plan of care Certification: 1/25/2023 to 4/25/23.     Outpatient Physical Therapy 2 times weekly for 6 weeks to include the following interventions: Cervical/Lumbar Traction, Electrical Stimulation  , Gait Training, Manual Therapy, Moist Heat/ Ice, Neuromuscular Re-ed, Patient Education, Therapeutic Activities, Therapeutic Exercise, and Ultrasound.     Cont POC    Alyssa Harrell, PT

## 2023-03-16 ENCOUNTER — CLINICAL SUPPORT (OUTPATIENT)
Dept: REHABILITATION | Facility: HOSPITAL | Age: 48
End: 2023-03-16
Attending: ORTHOPAEDIC SURGERY
Payer: COMMERCIAL

## 2023-03-16 DIAGNOSIS — M62.81 MUSCLE WEAKNESS OF UPPER EXTREMITY: ICD-10-CM

## 2023-03-16 DIAGNOSIS — M54.12 CERVICAL RADICULOPATHY: Primary | ICD-10-CM

## 2023-03-16 PROCEDURE — 97110 THERAPEUTIC EXERCISES: CPT | Mod: PO

## 2023-03-16 PROCEDURE — 97140 MANUAL THERAPY 1/> REGIONS: CPT | Mod: PO

## 2023-03-17 NOTE — PROGRESS NOTES
"  [facility]   Physical Therapy Treatment Note     Name: James Royden Peabody IV  Clinic Number: [id number]    Therapy Diagnosis:   Encounter Diagnoses   Name Primary?    Cervical radiculopathy Yes    Muscle weakness of upper extremity          Physician: Conrad Hill MD    Visit Date: 3/16/2023    Physician Orders: PT Eval and Treat   Medical Diagnosis from Referral: Cervical radiculopathy [M54.12]  Evaluation Date: 1/25/2023  Authorization Period Expiration: 12/31/23  Plan of Care Expiration: 4/25/23  Progress Note Due: 2/25/23  Visit # / Visits authorized: 4/ 40  FOTO: 1/3 - last completed on 2/7/23    PTA Visit #: 0/5     Precautions: Standard     Time In: 447 pm  Time Out: 532 pm  Total Billable Time: 45 minutes MT 3, TE 1      SUBJECTIVE     Pt reports: one good day of relief but limited HEP due to work.   He was not provided with home exercise program.  Response to previous treatment: sore after last week with one full day of relief.   Functional change: improved sleep    Pain: 4-5/10 pre and 2/10 post session   Location: bilateral arm         OBJECTIVE       Objective measures taken at progress report unless specified otherwise.     Tingling into the medial 3 fingers recreated by pressure over cubital tunnel on R arm    TREATMENT       Tracie received the treatments listed below:       therapeutic exercises to develop strength, endurance, ROM, posture, and core stabilization for 10 minutes including:      Supine cervical rotations 10x5"  Supine C1-2 SNAG x10 B  Thoracic extension over large foam roll 10x5"  Open books 15x5"  Recumbent chin tucks with ball behind head 20x3"   supine pec stretch on 3" towel roll x3 min (today no pain with activity) + horizontal abduction with green Tband and then diagonals alternating x 30    Supine horizontal 3" towel roll x 3 min for thoracic extension.  + shoulder flexion with 8# dowel x 30  Seated upper trunk rotations 15x3" B  Levator scap " "stretch 3x30" B  quadruped thread the needle x10 B (pain with activity)  Shoulder rows green theraband 3x10  Shoulder extensions green theraband 3x10        SKTC 10x5" B  LTR x20   Hooklying adduction isometric 20x3"   Hip abduction isometric with pilates ring 20x3"  Posterior pelvic tilt 20x3"   Sidelying clamshells no resistance 2x10 B  Bridges 2x10  SLR 2x10 B       manual therapy techniques: Joint mobilizations, Manual traction, and Soft tissue Mobilization were applied for 35 minutes, including:  prone levator scap pin and stretch  Supine cervical distraction with suboccipital release  Hypervolt massage gun vs MFR/STM to pectoralis minor, subclavius, teres minor infraspinatus, thoracic paraspinals, upper trap, and levator scap left.   PA and UPFA T2-T7 grade II/III  AC and SC multi-directional  mobs grade II/III      Patient provided written and verbal consent to receive functional dry needling at today's visit (see consent form scanned into chart). FDN performed to right levator scap using 30mm needle inserted approximately custodial following rib angle. Pt was positioned in sidelying with horizontal adduction and scapular retraction to allow superior angle of scapula to lift for easy identification. FDN performed to reduce pain and muscle tension, promote blood flow, and improve ROM and function x 7 minutes (no charge). Pt tolerated tx well without adverse effects. Tracie was educated on what to expect following the procedure and he verbalized understanding. - NP today     neuromuscular re-education activities to improve: Coordination, Proprioception, and Posture for 0 minutes. The following activities were included:  Scap squeeze 30x3"   Upper trap level 2 20x5"  Thoracic extension seated in gold chair 15x5"  Supine shoulder flexion AROM with supination 20x3"  Seated cervical isometrics 10x5"     therapeutic activities to improve functional performance for 0  minutes, including:         PATIENT EDUCATION AND " HOME EXERCISES     Home Exercises Provided and Patient Education Provided     Education provided:   - education on condition     Written Home Exercises Provided: emailed copy to [email or web address]. Exercises were reviewed and Tracie was able to demonstrate them prior to the end of the session.  Tracie demonstrated good  understanding of the education provided. See EMR under Patient Instructions for exercises provided during therapy sessions.    ASSESSMENT     Tracie with good relief after session with increased thoracic mobility and good carryover with mobility and posture of thoracic spine but workload continues to limit further progress with pain management but steadily improving.     Tracie Is progressing towards his goals.   Pt prognosis is Good.     Pt will continue to benefit from skilled outpatient physical therapy to address the deficits listed in the problem list box on initial evaluation, provide pt/family education and to maximize pt's level of independence in the home and community environment.     Pt's spiritual, cultural and educational needs considered and pt agreeable to plan of care and goals.     Anticipated barriers to physical therapy: chronicity of condition    GOALS: Short Term Goals: 4 weeks   1.Report decreased neck pain  <   / =  4  /10  to increase tolerance for working  2. Increase cervical ROM by 5-10 degrees in order to perform ADLs with decreased difficulty.  3. Increase strength in B shoulders/scapular stabilizers by 1/3 MMT grade to increase tolerance for ADL and work activities.  4. Pt to tolerate HEP to improve ROM and independence with ADL's     Long Term Goals: 8 weeks  1.Report decreased neck pain  <   / =  2  /10  to increase tolerance for working  2. Increase UE/neck flexibility in order to improve posture.    3.Increased strength in B shoulders/scapular stabilizers to >/= 4/5 MMT grade to increase tolerance for ADL and work activities.  4.  Pt will report at CJ level (20-40%  impaired) on FOTO neck score for neck pain disability to demonstrate decrease in disability and improvement in neck pain.     PLAN     Plan of care Certification: 1/25/2023 to 4/25/23.     Outpatient Physical Therapy 2 times weekly for 6 weeks to include the following interventions: Cervical/Lumbar Traction, Electrical Stimulation  , Gait Training, Manual Therapy, Moist Heat/ Ice, Neuromuscular Re-ed, Patient Education, Therapeutic Activities, Therapeutic Exercise, and Ultrasound.     Cont POC    Alyssa Harrell, PT

## 2023-03-23 ENCOUNTER — CLINICAL SUPPORT (OUTPATIENT)
Dept: REHABILITATION | Facility: HOSPITAL | Age: 48
End: 2023-03-23
Attending: ORTHOPAEDIC SURGERY
Payer: COMMERCIAL

## 2023-03-23 DIAGNOSIS — M54.12 CERVICAL RADICULOPATHY: Primary | ICD-10-CM

## 2023-03-23 DIAGNOSIS — M62.81 MUSCLE WEAKNESS OF UPPER EXTREMITY: ICD-10-CM

## 2023-03-23 PROCEDURE — 97110 THERAPEUTIC EXERCISES: CPT | Mod: PO

## 2023-03-23 PROCEDURE — 97140 MANUAL THERAPY 1/> REGIONS: CPT | Mod: PO

## 2023-03-27 NOTE — PROGRESS NOTES
"  [facility]   Physical Therapy Treatment Note     Name: James Royden Peabody IV  Clinic Number: [id number]    Therapy Diagnosis:   Encounter Diagnoses   Name Primary?    Cervical radiculopathy Yes    Muscle weakness of upper extremity          Physician: Conrad Hill MD    Visit Date: 3/23/2023    Physician Orders: PT Eval and Treat   Medical Diagnosis from Referral: Cervical radiculopathy [M54.12]  Evaluation Date: 1/25/2023  Authorization Period Expiration: 12/31/23  Plan of Care Expiration: 4/25/23  Progress Note Due: 2/25/23  Visit # / Visits authorized: 4/ 40  FOTO: 1/3 - last completed on 2/7/23    PTA Visit #: 0/5     Precautions: Standard     Time In: 455 pm  Time Out: 535 pm  Total Billable Time: 40 minutes MT 3, TE 1      SUBJECTIVE     Pt reports: long work day with deadlines but will be done by Monday hopefully.   He was not provided with home exercise program.  Response to previous treatment: sore after last week with one full day of relief.   Functional change: improved sleep increased work tolerance/     Pain: 6/10 pre and 2/10 post session   Location: bilateral arm         OBJECTIVE       Objective measures taken at progress report unless specified otherwise.     Tingling into the medial 3 fingers recreated by pressure over cubital tunnel on R arm    TREATMENT       Tracie received the treatments listed below:       therapeutic exercises to develop strength, endurance, ROM, posture, and core stabilization for 10 minutes including:      Supine cervical rotations 10x5"  Supine C1-2 SNAG x10 B  Thoracic extension over large foam roll 10x5"  Open books 15x5"  Recumbent chin tucks with ball behind head 20x3"   supine pec stretch on 3" towel roll x3 min (today no pain with activity) + horizontal abduction with green Tband and then diagonals alternating x 30    Supine horizontal 3" towel roll x 3 min for thoracic extension.  + shoulder flexion with 8# dowel x 30  Seated upper " "trunk rotations 15x3" B  Levator scap stretch 3x30" B  quadruped thread the needle x10 B + open book x 10 B  Shoulder rows green theraband 3x10  Shoulder extensions green theraband 3x10        SKTC 10x5" B  LTR x20   Hooklying adduction isometric 20x3"   Hip abduction isometric with pilates ring 20x3"  Posterior pelvic tilt 20x3"   Sidelying clamshells no resistance 2x10 B  Bridges 2x10  SLR 2x10 B       manual therapy techniques: Joint mobilizations, Manual traction, and Soft tissue Mobilization were applied for 30 minutes, including:  prone levator scap pin and stretch  Supine cervical distraction with suboccipital release  Hypervolt massage gun vs MFR/STM to pectoralis minor, subclavius, teres minor infraspinatus, thoracic paraspinals, upper trap, and levator scap left.   PA and UPFA T2-T7 grade II/III  AC and SC multi-directional  mobs grade II/III      Patient provided written and verbal consent to receive functional dry needling at today's visit (see consent form scanned into chart). FDN performed to right levator scap using 30mm needle inserted approximately [facility] following rib angle. Pt was positioned in sidelying with horizontal adduction and scapular retraction to allow superior angle of scapula to lift for easy identification. FDN performed to reduce pain and muscle tension, promote blood flow, and improve ROM and function x 7 minutes (no charge). Pt tolerated tx well without adverse effects. Tracie was educated on what to expect following the procedure and he verbalized understanding. - NP today     neuromuscular re-education activities to improve: Coordination, Proprioception, and Posture for 0 minutes. The following activities were included:  Scap squeeze 30x3"   Upper trap level 2 20x5"  Thoracic extension seated in gold chair 15x5"  Supine shoulder flexion AROM with supination 20x3"  Seated cervical isometrics 10x5"     therapeutic activities to improve functional performance for 0  minutes, " including:         PATIENT EDUCATION AND HOME EXERCISES     Home Exercises Provided and Patient Education Provided     Education provided:   - education on condition     Written Home Exercises Provided: emailed copy to [email or web address]. Exercises were reviewed and Tracie was able to demonstrate them prior to the end of the session.  Tracie demonstrated good  understanding of the education provided. See EMR under Patient Instructions for exercises provided during therapy sessions.    ASSESSMENT     Tracie with good relief  with only mild restriction on R thoracic but still WFL and L thoracic on rotation and with good increased workload tolerance with new efficiency with mobility of thoracic spine. Pt with no complaints of forearm or hand pain limiting work and overall better with less intensity of sensory irritation of hands.     Tracie Is progressing towards his goals.   Pt prognosis is Good.     Pt will continue to benefit from skilled outpatient physical therapy to address the deficits listed in the problem list box on initial evaluation, provide pt/family education and to maximize pt's level of independence in the home and community environment.     Pt's spiritual, cultural and educational needs considered and pt agreeable to plan of care and goals.     Anticipated barriers to physical therapy: chronicity of condition    GOALS: Short Term Goals: 4 weeks   1.Report decreased neck pain  <   / =  4  /10  to increase tolerance for working. MET 3/23/23  2. Increase cervical ROM by 5-10 degrees in order to perform ADLs with decreased difficulty.  3. Increase strength in B shoulders/scapular stabilizers by 1/3 MMT grade to increase tolerance for ADL and work activities.  4. Pt to tolerate HEP to improve ROM and independence with ADL's. MET 3/24/23     Long Term Goals: 8 weeks  1.Report decreased neck pain  <   / =  2  /10  to increase tolerance for working. MET 3/23/23  2. Increase UE/neck flexibility in order to  improve posture.  MET 3/23/23  3.Increased strength in B shoulders/scapular stabilizers to >/= 4/5 MMT grade to increase tolerance for ADL and work activities.  4.  Pt will report at CJ level (20-40% impaired) on FOTO neck score for neck pain disability to demonstrate decrease in disability and improvement in neck pain.     PLAN     Plan of care Certification: 1/25/2023 to 4/25/23.     Outpatient Physical Therapy 2 times weekly for 6 weeks to include the following interventions: Cervical/Lumbar Traction, Electrical Stimulation  , Gait Training, Manual Therapy, Moist Heat/ Ice, Neuromuscular Re-ed, Patient Education, Therapeutic Activities, Therapeutic Exercise, and Ultrasound.     Cont POC    Alyssa Harrell, PT

## 2023-03-28 ENCOUNTER — CLINICAL SUPPORT (OUTPATIENT)
Dept: REHABILITATION | Facility: HOSPITAL | Age: 48
End: 2023-03-28
Attending: ORTHOPAEDIC SURGERY
Payer: COMMERCIAL

## 2023-03-28 DIAGNOSIS — M54.12 CERVICAL RADICULOPATHY: Primary | ICD-10-CM

## 2023-03-28 DIAGNOSIS — M62.81 MUSCLE WEAKNESS OF UPPER EXTREMITY: ICD-10-CM

## 2023-03-28 PROCEDURE — 97112 NEUROMUSCULAR REEDUCATION: CPT | Mod: PO

## 2023-03-28 PROCEDURE — 97110 THERAPEUTIC EXERCISES: CPT | Mod: PO

## 2023-03-28 NOTE — PROGRESS NOTES
[facility]   Physical Therapy Treatment Note / Reassessment    Name: James Royden Peabody IV  Clinic Number: [id number]    Therapy Diagnosis:   Encounter Diagnoses   Name Primary?    Cervical radiculopathy Yes    Muscle weakness of upper extremity            Physician: Conrad Hill MD    Visit Date: 3/28/2023    Physician Orders: PT Eval and Treat   Medical Diagnosis from Referral: Cervical radiculopathy [M54.12]  Evaluation Date: 1/25/2023  Authorization Period Expiration: 12/31/23  Plan of Care Expiration: 4/25/23  Progress Note Due: 4/28/23  Visit # / Visits authorized: 10/ 40  FOTO: 1/3 - last completed on 2/7/23    PTA Visit #: 0/5     Precautions: Standard     Time In: 5:30  Time Out: 6:18  Total Billable Time: 48 minutes       SUBJECTIVE     Pt reports: feeling better after last few sessions   He was not provided with home exercise program.  Response to previous treatment: sore after last week with one full day of relief.   Functional change: improved sleep increased work tolerance/     Pain: 3-4/10 pre and 2/10 post session   Location: bilateral arm         OBJECTIVE     Observation: forward head, rounded shoulders, increased thoracic kyphosis and abducted scapulas bilaterally     Posture:      Cervical Range of Motion:     Degrees(%) Pain   Flexion 55 Yes at end range       Extension 60 Yes at end range      Right Rotation full Same as above      Left Rotation full no      Right Side Bending 55 no   Left Side Bending 50 no            Shoulder Range of Motion: WFL with pain in posterior right shoulder with flexion and abduction   Tenderness over right biceps tendon and at right inferior border of scapula     Upper Extremity Strength  (R) UE   (L) UE     Shoulder flexion: 4/5 Shoulder flexion: 4/5   Shoulder Abduction: 5/5 Shoulder abduction: 5/5   Shoulder ER 5/5 Shoulder ER 5/5   Shoulder IR 5/5 Shoulder IR 5/5   Elbow flexion: 5/5 Elbow flexion: 5/5   Elbow extension: 5/5  "Elbow extension: 5/5   Wrist flexion: 5/5 elbow pain Wrist flexion: 5/5   Wrist extension: 5/5 elbow pain  Wrist extension: 5/5         Special Tests:  Distraction + for pain relief    Spurlings +L ; -R      Cervical joint mobility: no pain noted with CPA in upper cervical spine, pain reproduced with C6-C7 CPA  Tightness noted with sideglide towards L at C6-C7                Right first rib elevated, hypomobile, and tender        Thoracic mobility: significant limitations in upper to mid thoracic spine. Improved mobility beginning around T10     Palpation: tenderness in upper traps, right first rib, cervical paraspinals and some spinous processes       Sensation: NT     Flexibility: limitations in lats and pec minor      Neural tension:   -ULTT Median: + bilaterally  -ULTT Ulnar: + bilaterally  -ULTT Radial: NT        Limitation/Restriction for FOTO neck Survey     Therapist reviewed FOTO scores for James Royden Peabody IV on 1/25/2023.   FOTO documents entered into [employer] - see Media section.     Limitation Score: not performed today - please complete at first follow up visit%             TREATMENT       Tracie received the treatments listed below:       therapeutic exercises to develop strength, endurance, ROM, posture, and core stabilization for 40 minutes including:      Supine cervical rotations 10x5"  Supine C1-2 SNAG x10 B  Thoracic extension over large foam roll 10x5"  Open books 15x5"  Recumbent chin tucks with ball behind head 20x3"   supine pec stretch on 3" towel roll x3 min (today no pain with activity) + horizontal abduction with green Tband and then diagonals alternating x 30    Supine horizontal 3" towel roll x 3 min for thoracic extension.  + shoulder flexion with 8# dowel x 30  Seated upper trunk rotations 15x3" B  Levator scap stretch 3x30" B  quadruped thread the needle x10 B   open book x 10 B  Shoulder rows green theraband 3x10  Shoulder extensions green theraband 3x10  +single arm row with " "thoracic rotation at free motion 10# 2x10 B  +single arm lat pull with thoracic rotation at free motion 10# 2x10 B            SKTC 10x5" B  LTR x20   Hooklying adduction isometric 20x3"   Hip abduction isometric with pilates ring 20x3"  Posterior pelvic tilt 20x3"   Sidelying clamshells no resistance 2x10 B  Bridges 2x10  SLR 2x10 B       manual therapy techniques: Joint mobilizations, Manual traction, and Soft tissue Mobilization were applied for 0 minutes, including:  prone levator scap pin and stretch  Supine cervical distraction with suboccipital release  Hypervolt massage gun vs MFR/STM to pectoralis minor, subclavius, teres minor infraspinatus, thoracic paraspinals, upper trap, and levator scap left.   PA and UPFA T2-T7 grade II/III  AC and SC multi-directional  mobs grade II/III      Patient provided written and verbal consent to receive functional dry needling at today's visit (see consent form scanned into chart). FDN performed to right levator scap using 30mm needle inserted approximately care home following rib angle. Pt was positioned in sidelying with horizontal adduction and scapular retraction to allow superior angle of scapula to lift for easy identification. FDN performed to reduce pain and muscle tension, promote blood flow, and improve ROM and function x 7 minutes (no charge). Pt tolerated tx well without adverse effects. Tracie was educated on what to expect following the procedure and he verbalized understanding. - NP today     neuromuscular re-education activities to improve: Coordination, Proprioception, and Posture for 10 minutes. The following activities were included:  Scap squeeze 30x3"   Upper trap level 2 20x5"  Thoracic extension seated in gold chair 15x5"  Supine shoulder flexion AROM with supination 20x3"  Seated cervical isometrics 10x5"     therapeutic activities to improve functional performance for 0  minutes, including:         PATIENT EDUCATION AND HOME EXERCISES     Home " Exercises Provided and Patient Education Provided     Education provided:   - education on condition     Written Home Exercises Provided: emailed copy to [email or web address]. Exercises were reviewed and Tracie was able to demonstrate them prior to the end of the session.  Tracie demonstrated good  understanding of the education provided. See EMR under Patient Instructions for exercises provided during therapy sessions.    ASSESSMENT     Tracie with good relief  over past few sessions with manual therapy. Held manual PT today and returned to exercises with focus on range of motion followed by strengthening within the available range of motion. Pt reports he will have a bit less work to do in the coming weeks and anticipates continued improvement as a result of this. Continue to progress as tolerated.     Tracie Is progressing towards his goals.   Pt prognosis is Good.     Pt will continue to benefit from skilled outpatient physical therapy to address the deficits listed in the problem list box on initial evaluation, provide pt/family education and to maximize pt's level of independence in the home and community environment.     Pt's spiritual, cultural and educational needs considered and pt agreeable to plan of care and goals.     Anticipated barriers to physical therapy: chronicity of condition    GOALS: Short Term Goals: 4 weeks   1.Report decreased neck pain  <   / =  4  /10  to increase tolerance for working. MET 3/23/23  2. Increase cervical ROM by 5-10 degrees in order to perform ADLs with decreased difficulty.  3. Increase strength in B shoulders/scapular stabilizers by 1/3 MMT grade to increase tolerance for ADL and work activities.  4. Pt to tolerate HEP to improve ROM and independence with ADL's. MET 3/24/23     Long Term Goals: 8 weeks  1.Report decreased neck pain  <   / =  2  /10  to increase tolerance for working. MET 3/23/23  2. Increase UE/neck flexibility in order to improve posture.  MET  3/23/23  3.Increased strength in B shoulders/scapular stabilizers to >/= 4/5 MMT grade to increase tolerance for ADL and work activities.  4.  Pt will report at CJ level (20-40% impaired) on FOTO neck score for neck pain disability to demonstrate decrease in disability and improvement in neck pain.     PLAN     Plan of care Certification: 1/25/2023 to 4/25/23.     Outpatient Physical Therapy 2 times weekly for 6 weeks to include the following interventions: Cervical/Lumbar Traction, Electrical Stimulation  , Gait Training, Manual Therapy, Moist Heat/ Ice, Neuromuscular Re-ed, Patient Education, Therapeutic Activities, Therapeutic Exercise, and Ultrasound.     Cont POC    Elayne Sewell, PT

## 2023-04-06 ENCOUNTER — CLINICAL SUPPORT (OUTPATIENT)
Dept: REHABILITATION | Facility: HOSPITAL | Age: 48
End: 2023-04-06
Attending: ORTHOPAEDIC SURGERY
Payer: COMMERCIAL

## 2023-04-06 DIAGNOSIS — M54.12 CERVICAL RADICULOPATHY: Primary | ICD-10-CM

## 2023-04-06 DIAGNOSIS — M62.81 MUSCLE WEAKNESS OF UPPER EXTREMITY: ICD-10-CM

## 2023-04-06 PROCEDURE — 97110 THERAPEUTIC EXERCISES: CPT | Mod: PO

## 2023-04-06 PROCEDURE — 97140 MANUAL THERAPY 1/> REGIONS: CPT | Mod: PO

## 2023-04-06 NOTE — PROGRESS NOTES
[facility]   Physical Therapy Treatment Note / Reassessment    Name: James Royden Peabody IV  Clinic Number: [id number]    Therapy Diagnosis:   No diagnosis found.          Physician: Conrad Hill MD    Visit Date: 4/6/2023    Physician Orders: PT Eval and Treat   Medical Diagnosis from Referral: Cervical radiculopathy [M54.12]  Evaluation Date: 1/25/2023  Authorization Period Expiration: 12/31/23  Plan of Care Expiration: 4/25/23  Progress Note Due: 4/28/23  Visit # / Visits authorized: 10/ 40  FOTO: 1/3 - last completed on 2/7/23    PTA Visit #: 0/5     Precautions: Standard     Time In: 5:30  Time Out: 6:10  Total Billable Time: 40 minutes MT 1 TE 3      SUBJECTIVE     Pt reports: feeling better after last few sessions and less workload  He was not provided with home exercise program.  Response to previous treatment: sore after last week with one full day of relief.   Functional change: improved sleep increased work tolerance/     Pain: 3-4/10 pre and 2/10 post session   Location: bilateral arm         OBJECTIVE     Observation: forward head, rounded shoulders, increased thoracic kyphosis and abducted scapulas bilaterally     Posture:      Cervical Range of Motion:     Degrees(%) Pain   Flexion 55 Yes at end range       Extension 60 Yes at end range      Right Rotation full Same as above      Left Rotation full no      Right Side Bending 55 no   Left Side Bending 50 no            Shoulder Range of Motion: WFL with pain in posterior right shoulder with flexion and abduction   Tenderness over right biceps tendon and at right inferior border of scapula     Upper Extremity Strength  (R) UE   (L) UE     Shoulder flexion: 4/5 Shoulder flexion: 4/5   Shoulder Abduction: 5/5 Shoulder abduction: 5/5   Shoulder ER 5/5 Shoulder ER 5/5   Shoulder IR 5/5 Shoulder IR 5/5   Elbow flexion: 5/5 Elbow flexion: 5/5   Elbow extension: 5/5 Elbow extension: 5/5   Wrist flexion: 5/5 elbow pain Wrist  "flexion: 5/5   Wrist extension: 5/5 elbow pain  Wrist extension: 5/5         Special Tests:  Distraction + for pain relief    Spurlings +L ; -R      Cervical joint mobility: no pain noted with CPA in upper cervical spine, pain reproduced with C6-C7 CPA  Tightness noted with sideglide towards L at C6-C7                Right first rib elevated, hypomobile, and tender        Thoracic mobility: significant limitations in upper to mid thoracic spine. Improved mobility beginning around T10     Palpation: tenderness in upper traps, right first rib, cervical paraspinals and some spinous processes       Sensation: NT     Flexibility: limitations in lats and pec minor      Neural tension:   -ULTT Median: + bilaterally  -ULTT Ulnar: + bilaterally  -ULTT Radial: NT        Limitation/Restriction for FOTO neck Survey     Therapist reviewed FOTO scores for James Royden Peabody IV on 1/25/2023.   FOTO documents entered into [employer] - see Media section.     Limitation Score: not performed today - please complete at first follow up visit%             TREATMENT       Tracie received the treatments listed below:       therapeutic exercises to develop strength, endurance, ROM, posture, and core stabilization for 30 minutes including:      Supine cervical rotations 10x5"  Supine C1-2 SNAG x10 B  Thoracic extension over large foam roll 10x5"  Open books 15x5"  Recumbent chin tucks with ball behind head 20x3"   supine pec stretch on 3" towel roll x3 min (today no pain with activity) + horizontal abduction with green Tband and then diagonals alternating x 30    Supine horizontal 3" towel roll x 3 min for thoracic extension.  + shoulder flexion with 8# dowel x 30  Seated upper trunk rotations 15x3" B  Levator scap stretch 3x30" B  quadruped thread the needle x10 B   open book x 10 B  Shoulder rows green theraband 3x10  Shoulder extensions green theraband 3x10  +single arm row with thoracic rotation at free motion 10# 2x10 B  +single arm lat " "pull with thoracic rotation at free motion 10# 2x10 B            SKTC 10x5" B  LTR x20   Hooklying adduction isometric 20x3"   Hip abduction isometric with pilates ring 20x3"  Posterior pelvic tilt 20x3"   Sidelying clamshells no resistance 2x10 B  Bridges 2x10  SLR 2x10 B       manual therapy techniques: Joint mobilizations, Manual traction, and Soft tissue Mobilization were applied for 10 minutes, including:  prone levator scap pin and stretch  Supine cervical distraction with suboccipital release  Hypervolt massage gun vs MFR/STM to pectoralis minor, subclavius, teres minor infraspinatus, thoracic paraspinals, upper trap, and levator scap left.   PA and UPFA T2-T7 grade II/III  AC and SC multi-directional  mobs grade II/III      Patient provided written and verbal consent to receive functional dry needling at today's visit (see consent form scanned into chart). FDN performed to right levator scap using 30mm needle inserted approximately longterm following rib angle. Pt was positioned in sidelying with horizontal adduction and scapular retraction to allow superior angle of scapula to lift for easy identification. FDN performed to reduce pain and muscle tension, promote blood flow, and improve ROM and function x 7 minutes (no charge). Pt tolerated tx well without adverse effects. Tracie was educated on what to expect following the procedure and he verbalized understanding. - NP today     neuromuscular re-education activities to improve: Coordination, Proprioception, and Posture for 0 minutes. The following activities were included:  Scap squeeze 30x3"   Upper trap level 2 20x5"  Thoracic extension seated in gold chair 15x5"  Supine shoulder flexion AROM with supination 20x3"  Seated cervical isometrics 10x5"     therapeutic activities to improve functional performance for 0  minutes, including:         PATIENT EDUCATION AND HOME EXERCISES     Home Exercises Provided and Patient Education Provided     Education " provided:   - education on condition     Written Home Exercises Provided: emailed copy to [email or web address]. Exercises were reviewed and Tracie was able to demonstrate them prior to the end of the session.  Tracie demonstrated good  understanding of the education provided. See EMR under Patient Instructions for exercises provided during therapy sessions.    ASSESSMENT     Tracie with continued good relief with lower workload professionally and with good tolerance with PT session. Pt continued to progress overall and good carryover with posture.    Tracie Is progressing towards his goals.   Pt prognosis is Good.     Pt will continue to benefit from skilled outpatient physical therapy to address the deficits listed in the problem list box on initial evaluation, provide pt/family education and to maximize pt's level of independence in the home and community environment.     Pt's spiritual, cultural and educational needs considered and pt agreeable to plan of care and goals.     Anticipated barriers to physical therapy: chronicity of condition    GOALS: Short Term Goals: 4 weeks   1.Report decreased neck pain  <   / =  4  /10  to increase tolerance for working. MET 3/23/23  2. Increase cervical ROM by 5-10 degrees in order to perform ADLs with decreased difficulty.  3. Increase strength in B shoulders/scapular stabilizers by 1/3 MMT grade to increase tolerance for ADL and work activities.  4. Pt to tolerate HEP to improve ROM and independence with ADL's. MET 3/24/23     Long Term Goals: 8 weeks  1.Report decreased neck pain  <   / =  2  /10  to increase tolerance for working. MET 3/23/23  2. Increase UE/neck flexibility in order to improve posture.  MET 3/23/23  3.Increased strength in B shoulders/scapular stabilizers to >/= 4/5 MMT grade to increase tolerance for ADL and work activities.  4.  Pt will report at CJ level (20-40% impaired) on FOTO neck score for neck pain disability to demonstrate decrease in disability  and improvement in neck pain.     PLAN     Plan of care Certification: 1/25/2023 to 4/25/23.     Outpatient Physical Therapy 2 times weekly for 6 weeks to include the following interventions: Cervical/Lumbar Traction, Electrical Stimulation  , Gait Training, Manual Therapy, Moist Heat/ Ice, Neuromuscular Re-ed, Patient Education, Therapeutic Activities, Therapeutic Exercise, and Ultrasound.     Cont POC    Alyssa Harrell, PT

## 2023-04-13 ENCOUNTER — CLINICAL SUPPORT (OUTPATIENT)
Dept: REHABILITATION | Facility: HOSPITAL | Age: 48
End: 2023-04-13
Attending: ORTHOPAEDIC SURGERY
Payer: COMMERCIAL

## 2023-04-13 DIAGNOSIS — M54.12 CERVICAL RADICULOPATHY: Primary | ICD-10-CM

## 2023-04-13 DIAGNOSIS — M62.81 MUSCLE WEAKNESS OF UPPER EXTREMITY: ICD-10-CM

## 2023-04-13 PROCEDURE — 97140 MANUAL THERAPY 1/> REGIONS: CPT | Mod: PO

## 2023-04-13 NOTE — PROGRESS NOTES
[facility]   Physical Therapy Treatment Note / Reassessment    Name: James Royden Peabody IV  Clinic Number: [id number]    Therapy Diagnosis:   Encounter Diagnoses   Name Primary?    Cervical radiculopathy Yes    Muscle weakness of upper extremity              Physician: Conrad Hill MD    Visit Date: 4/13/2023    Physician Orders: PT Eval and Treat   Medical Diagnosis from Referral: Cervical radiculopathy [M54.12]  Evaluation Date: 1/25/2023  Authorization Period Expiration: 12/31/23  Plan of Care Expiration: 4/25/23  Progress Note Due: 4/28/23  Visit # / Visits authorized: 10/ 40  FOTO: 1/3 - last completed on 2/7/23    PTA Visit #: 0/5     Precautions: Standard     Time In: 5:30  Time Out: 6:10  Total Billable Time: 40 minutes MT 4      SUBJECTIVE     Pt reports: feeling worse after long weekend at [employer] with yolanda and horseplay with kids.   He was not provided with home exercise program.  Response to previous treatment: sore after last week with one full day of relief.   Functional change: improved sleep increased work tolerance/     Pain: 6/10 pre and 3/10 post session   Location: bilateral arm         OBJECTIVE     Observation: forward head, rounded shoulders, increased thoracic kyphosis and abducted scapulas bilaterally     Posture:      Cervical Range of Motion:     Degrees(%) Pain   Flexion 55 Yes at end range       Extension 60 Yes at end range      Right Rotation full Same as above      Left Rotation full no      Right Side Bending 55 no   Left Side Bending 50 no            Shoulder Range of Motion: WFL with pain in posterior right shoulder with flexion and abduction   Tenderness over right biceps tendon and at right inferior border of scapula     Upper Extremity Strength  (R) UE   (L) UE     Shoulder flexion: 4/5 Shoulder flexion: 4/5   Shoulder Abduction: 5/5 Shoulder abduction: 5/5   Shoulder ER 5/5 Shoulder ER 5/5   Shoulder IR 5/5 Shoulder IR 5/5   Elbow flexion: 5/5  "Elbow flexion: 5/5   Elbow extension: 5/5 Elbow extension: 5/5   Wrist flexion: 5/5 elbow pain Wrist flexion: 5/5   Wrist extension: 5/5 elbow pain  Wrist extension: 5/5         Special Tests:  Distraction + for pain relief    Spurlings +L ; -R      Cervical joint mobility: no pain noted with CPA in upper cervical spine, pain reproduced with C6-C7 CPA  Tightness noted with sideglide towards L at C6-C7                Right first rib elevated, hypomobile, and tender        Thoracic mobility: significant limitations in upper to mid thoracic spine. Improved mobility beginning around T10     Palpation: tenderness in upper traps, right first rib, cervical paraspinals and some spinous processes       Sensation: NT     Flexibility: limitations in lats and pec minor      Neural tension:   -ULTT Median: + bilaterally  -ULTT Ulnar: + bilaterally  -ULTT Radial: NT        Limitation/Restriction for FOTO neck Survey     Therapist reviewed FOTO scores for James Royden Peabody IV on 1/25/2023.   FOTO documents entered into [employer] - see Media section.     Limitation Score: not performed today - please complete at first follow up visit%             TREATMENT       Tracie received the treatments listed below:       therapeutic exercises to develop strength, endurance, ROM, posture, and core stabilization for 0 minutes including:      Supine cervical rotations 10x5"  Supine C1-2 SNAG x10 B  Thoracic extension over large foam roll 10x5"  Open books 15x5"  Recumbent chin tucks with ball behind head 20x3"   supine pec stretch on 3" towel roll x3 min (today no pain with activity) + horizontal abduction with green Tband and then diagonals alternating x 30    Supine horizontal 3" towel roll x 3 min for thoracic extension.  + shoulder flexion with 8# dowel x 30  Seated upper trunk rotations 15x3" B  Levator scap stretch 3x30" B  quadruped thread the needle x10 B   open book x 10 B  Shoulder rows green theraband 3x10  Shoulder extensions green " "theraband 3x10  +single arm row with thoracic rotation at free motion 10# 2x10 B  +single arm lat pull with thoracic rotation at free motion 10# 2x10 B            SKTC 10x5" B  LTR x20   Hooklying adduction isometric 20x3"   Hip abduction isometric with pilates ring 20x3"  Posterior pelvic tilt 20x3"   Sidelying clamshells no resistance 2x10 B  Bridges 2x10  SLR 2x10 B       manual therapy techniques: Joint mobilizations, Manual traction, and Soft tissue Mobilization were applied for 40 minutes, including:  prone levator scap pin and stretch  Supine cervical distraction with suboccipital release  Hypervolt massage gun vs MFR/STM to pectoralis minor, subclavius, teres minor infraspinatus, thoracic paraspinals, upper trap, and levator scap left.   PA and UPFA T2-T7 grade II/III  AC and SC multi-directional  mobs grade II/III      Patient provided written and verbal consent to receive functional dry needling at today's visit (see consent form scanned into chart). FDN performed to right levator scap using 30mm needle inserted approximately retirement following rib angle. Pt was positioned in sidelying with horizontal adduction and scapular retraction to allow superior angle of scapula to lift for easy identification. FDN performed to reduce pain and muscle tension, promote blood flow, and improve ROM and function x 7 minutes (no charge). Pt tolerated tx well without adverse effects. Tracie was educated on what to expect following the procedure and he verbalized understanding. - NP today     neuromuscular re-education activities to improve: Coordination, Proprioception, and Posture for 0 minutes. The following activities were included:  Scap squeeze 30x3"   Upper trap level 2 20x5"  Thoracic extension seated in gold chair 15x5"  Supine shoulder flexion AROM with supination 20x3"  Seated cervical isometrics 10x5"     therapeutic activities to improve functional performance for 0  minutes, including:         PATIENT EDUCATION " AND HOME EXERCISES     Home Exercises Provided and Patient Education Provided     Education provided:   - education on condition     Written Home Exercises Provided: emailed copy to [email or web address]. Exercises were reviewed and Tracie was able to demonstrate them prior to the end of the session.  Tracie demonstrated good  understanding of the education provided. See EMR under Patient Instructions for exercises provided during therapy sessions.    ASSESSMENT     Tracie with continued good relief with session but had moderate severe exacerbation last weekend but had some relief for extended period and reduction with meds for prolonged period prior to this weekend.     Tracie Is progressing towards his goals.   Pt prognosis is Good.     Pt will continue to benefit from skilled outpatient physical therapy to address the deficits listed in the problem list box on initial evaluation, provide pt/family education and to maximize pt's level of independence in the home and community environment.     Pt's spiritual, cultural and educational needs considered and pt agreeable to plan of care and goals.     Anticipated barriers to physical therapy: chronicity of condition    GOALS: Short Term Goals: 4 weeks   1.Report decreased neck pain  <   / =  4  /10  to increase tolerance for working. MET 3/23/23  2. Increase cervical ROM by 5-10 degrees in order to perform ADLs with decreased difficulty.  3. Increase strength in B shoulders/scapular stabilizers by 1/3 MMT grade to increase tolerance for ADL and work activities.  4. Pt to tolerate HEP to improve ROM and independence with ADL's. MET 3/24/23     Long Term Goals: 8 weeks  1.Report decreased neck pain  <   / =  2  /10  to increase tolerance for working. MET 3/23/23  2. Increase UE/neck flexibility in order to improve posture.  MET 3/23/23  3.Increased strength in B shoulders/scapular stabilizers to >/= 4/5 MMT grade to increase tolerance for ADL and work activities.  4.  Pt  will report at CJ level (20-40% impaired) on FOTO neck score for neck pain disability to demonstrate decrease in disability and improvement in neck pain.     PLAN     Plan of care Certification: 1/25/2023 to 4/25/23.     Outpatient Physical Therapy 2 times weekly for 6 weeks to include the following interventions: Cervical/Lumbar Traction, Electrical Stimulation  , Gait Training, Manual Therapy, Moist Heat/ Ice, Neuromuscular Re-ed, Patient Education, Therapeutic Activities, Therapeutic Exercise, and Ultrasound.     Cont POC    Alyssa Harrell, PT

## 2023-04-20 ENCOUNTER — CLINICAL SUPPORT (OUTPATIENT)
Dept: REHABILITATION | Facility: HOSPITAL | Age: 48
End: 2023-04-20
Attending: ORTHOPAEDIC SURGERY
Payer: COMMERCIAL

## 2023-04-20 DIAGNOSIS — M54.12 CERVICAL RADICULOPATHY: Primary | ICD-10-CM

## 2023-04-20 DIAGNOSIS — M62.81 MUSCLE WEAKNESS OF UPPER EXTREMITY: ICD-10-CM

## 2023-04-20 PROCEDURE — 97140 MANUAL THERAPY 1/> REGIONS: CPT | Mod: PO

## 2023-04-20 PROCEDURE — 97110 THERAPEUTIC EXERCISES: CPT | Mod: PO

## 2023-04-20 NOTE — PROGRESS NOTES
[facility]   Physical Therapy Treatment Note / Reassessment    Name: James Royden Peabody IV  Clinic Number: [id number]    Therapy Diagnosis:   Encounter Diagnoses   Name Primary?    Cervical radiculopathy Yes    Muscle weakness of upper extremity              Physician: Conrad Hill MD    Visit Date: 4/20/2023    Physician Orders: PT Eval and Treat   Medical Diagnosis from Referral: Cervical radiculopathy [M54.12]  Evaluation Date: 1/25/2023  Authorization Period Expiration: 12/31/23  Plan of Care Expiration: 4/25/23 extend to 5/31/23  Progress Note Due: 4/28/23  Visit # / Visits authorized: 13/ 40  FOTO: 1/3 - last completed on 2/7/23    PTA Visit #: 0/5     Precautions: Standard     Time In: 430 pm  Time Out: 520 pm  Total Billable Time: 50 minutes MT 3 TE 2      SUBJECTIVE     Pt reports: feeling better overall and mostly back to where I was before flare-up with less meds overall.      He was not provided with home exercise program.  Response to previous treatment: sore after last week with one full day of relief.   Functional change: improved sleep increased work tolerance/     Pain: 4-5/10 pre and 2/10 post session   Location: bilateral arm         OBJECTIVE   4/20/23  Observation: forward head, rounded shoulders, increased thoracic kyphosis and abducted scapulas bilaterally     Posture:      Cervical Range of Motion:     Degrees(%) Pain   Flexion 55 Yes at end range       Extension 60 Yes at end range      Right Rotation full Same as above      Left Rotation full no      Right Side Bending 55 no   Left Side Bending 50 no            Shoulder Range of Motion: WFL with pain in posterior right shoulder with flexion and abduction   Tenderness over right biceps tendon and at right inferior border of scapula     Upper Extremity Strength UE WNL except shoulder flexion and abduction  (R) UE   (L) UE     Shoulder flexion: 4+/5 to 5/5 Shoulder flexion: 4+/5 to 5/5   Shoulder abduction   "4+/5 Shoulder abduction  4+/5                                             Special Tests:  Distraction + for pain relief    Spurlings +L ; -R      Cervical joint mobility: no pain noted with CPA in upper cervical spine, pain reproduced with C6-C7 CPA along with T4-T7 along with UPA  Tightness noted with sideglide towards L at C6-C7                Right first rib elevated, hypomobile, and tender        Thoracic mobility: mild limits on R  in upper to mid thoracic spine T4-T7. Improved mobility beginning around T10     Palpation: tenderness in upper traps, right first rib, cervical paraspinals and some spinous processes, levator and pectoralis minor and teres minor.      Sensation: NT     Flexibility: limitations in lats and pec minor      Neural tension:  NOT TESTED  -ULTT Median: + bilaterally  -ULTT Ulnar: + bilaterally  -ULTT Radial: NT        Limitation/Restriction for FOTO neck Survey     Therapist reviewed FOTO scores for James Royden Peabody IV on 1/25/2023.   FOTO documents entered into [employer] - see Media section.     Limitation Score: not performed today - please complete at first follow up visit%             TREATMENT       Tracie received the treatments listed below:       therapeutic exercises to develop strength, endurance, ROM, posture, and core stabilization for 20 minutes including:      Supine cervical rotations 10x5"  Supine C1-2 SNAG x10 B  Thoracic extension over large foam roll 10x5"  Open books 15x5"  Recumbent chin tucks with ball behind head 20x3"   supine pec stretch on 3" towel roll x3 min (today no pain with activity) + horizontal abduction with green Tband and then diagonals alternating x 30    Supine horizontal 3" towel roll x 3 min for thoracic extension.  + shoulder flexion with 8# dowel x 30  Seated upper trunk rotations 15x3" B  Levator scap stretch 3x30" B  quadruped thread the needle x10 B   open book x 10 B  Shoulder rows green theraband 3x10  Shoulder extensions green theraband " "3x10  +single arm row with thoracic rotation at free motion 10# 2x10 B  +single arm lat pull with thoracic rotation at free motion 10# 2x10 B  +reverse  10 # 2 x 10            SKTC 10x5" B  LTR x20   Hooklying adduction isometric 20x3"   Hip abduction isometric with pilates ring 20x3"  Posterior pelvic tilt 20x3"   Sidelying clamshells no resistance 2x10 B  Bridges 2x10  SLR 2x10 B       manual therapy techniques: Joint mobilizations, Manual traction, and Soft tissue Mobilization were applied for 30 minutes, including:  prone levator scap pin and stretch  Supine cervical distraction with suboccipital release  Hypervolt massage gun vs MFR/STM to pectoralis minor, subclavius, teres minor infraspinatus, thoracic paraspinals, upper trap, and levator scap left.   PA and UPFA T2-T7 grade II/III  AC and SC multi-directional  mobs grade II/III      Patient provided written and verbal consent to receive functional dry needling at today's visit (see consent form scanned into chart). FDN performed to right levator scap using 30mm needle inserted approximately assisted following rib angle. Pt was positioned in sidelying with horizontal adduction and scapular retraction to allow superior angle of scapula to lift for easy identification. FDN performed to reduce pain and muscle tension, promote blood flow, and improve ROM and function x 7 minutes (no charge). Pt tolerated tx well without adverse effects. Tracie was educated on what to expect following the procedure and he verbalized understanding. - NP today     neuromuscular re-education activities to improve: Coordination, Proprioception, and Posture for 0 minutes. The following activities were included:  Scap squeeze 30x3"   Upper trap level 2 20x5"  Thoracic extension seated in gold chair 15x5"  Supine shoulder flexion AROM with supination 20x3"  Seated cervical isometrics 10x5"     therapeutic activities to improve functional performance for 0  minutes, " including:         PATIENT EDUCATION AND HOME EXERCISES     Home Exercises Provided and Patient Education Provided     Education provided:   - education on condition     Written Home Exercises Provided: emailed copy to [email or web address]. Exercises were reviewed and Tracie was able to demonstrate them prior to the end of the session.  Tracie demonstrated good  understanding of the education provided. See EMR under Patient Instructions for exercises provided during therapy sessions.    ASSESSMENT     Tracie with continued good relief with session and normal on L rotation and limited by 10-15 deg with thoracic rotation on R compared to L. Pt with good insight and overall good thoracic vertebral and costovertebral mobility. Pt has good insight and good relief overall. Pt continues to progress and overall is progressing towards normal vs L.  Pt achieved 5 of 8 set goals. PT to extend POC to continue to progress and stabilize but will always be limited by his workload with his job requirements.    Tracie Is progressing towards his goals.   Pt prognosis is Good.     Pt will continue to benefit from skilled outpatient physical therapy to address the deficits listed in the problem list box on initial evaluation, provide pt/family education and to maximize pt's level of independence in the home and community environment.     Pt's spiritual, cultural and educational needs considered and pt agreeable to plan of care and goals.     Anticipated barriers to physical therapy: chronicity of condition    GOALS: Short Term Goals: 4 weeks   1.Report decreased neck pain  <   / =  4  /10  to increase tolerance for working. MET 3/23/23  2. Increase cervical ROM by 5-10 degrees in order to perform ADLs with decreased difficulty.  3. Increase strength in B shoulders/scapular stabilizers by 1/3 MMT grade to increase tolerance for ADL and work activities.  4. Pt to tolerate HEP to improve ROM and independence with ADL's. MET 3/24/23     Long  Term Goals: 8 weeks  1.Report decreased neck pain  <   / =  2  /10  to increase tolerance for working. MET 3/23/23  2. Increase UE/neck flexibility in order to improve posture.  MET 3/23/23  3.Increased strength in B shoulders/scapular stabilizers to >/= 4/5 MMT grade to increase tolerance for ADL and work activities. MET 4/20/23  4.  Pt will report at CJ level (20-40% impaired) on FOTO neck score for neck pain disability to demonstrate decrease in disability and improvement in neck pain.     PLAN     Plan of care Certification: 1/25/2023 to 4/25/23 extend to 5/31/23     Outpatient Physical Therapy 2 times weekly for 6 weeks to include the following interventions: Cervical/Lumbar Traction, Electrical Stimulation  , Gait Training, Manual Therapy, Moist Heat/ Ice, Neuromuscular Re-ed, Patient Education, Therapeutic Activities, Therapeutic Exercise, and Ultrasound.     Cont POC    Alyssa Harrell, PT

## 2023-04-27 DIAGNOSIS — M54.12 CERVICAL RADICULOPATHY: ICD-10-CM

## 2023-04-27 RX ORDER — GABAPENTIN 300 MG/1
CAPSULE ORAL
Qty: 60 CAPSULE | Refills: 1 | OUTPATIENT
Start: 2023-04-27

## 2023-06-29 DIAGNOSIS — Z00.00 ANNUAL PHYSICAL EXAM: ICD-10-CM

## 2023-06-29 DIAGNOSIS — E78.00 PURE HYPERCHOLESTEROLEMIA: Primary | ICD-10-CM

## 2023-07-10 ENCOUNTER — OFFICE VISIT (OUTPATIENT)
Dept: INTERNAL MEDICINE | Facility: CLINIC | Age: 48
End: 2023-07-10
Payer: COMMERCIAL

## 2023-07-10 ENCOUNTER — TELEPHONE (OUTPATIENT)
Dept: ORTHOPEDICS | Facility: CLINIC | Age: 48
End: 2023-07-10
Payer: COMMERCIAL

## 2023-07-10 ENCOUNTER — LAB VISIT (OUTPATIENT)
Dept: LAB | Facility: HOSPITAL | Age: 48
End: 2023-07-10
Attending: STUDENT IN AN ORGANIZED HEALTH CARE EDUCATION/TRAINING PROGRAM
Payer: COMMERCIAL

## 2023-07-10 VITALS
DIASTOLIC BLOOD PRESSURE: 93 MMHG | WEIGHT: 195.69 LBS | HEART RATE: 73 BPM | SYSTOLIC BLOOD PRESSURE: 134 MMHG | HEIGHT: 69 IN | BODY MASS INDEX: 28.98 KG/M2

## 2023-07-10 DIAGNOSIS — G89.29 CHRONIC RIGHT SHOULDER PAIN: ICD-10-CM

## 2023-07-10 DIAGNOSIS — Z00.00 ANNUAL PHYSICAL EXAM: ICD-10-CM

## 2023-07-10 DIAGNOSIS — M25.511 CHRONIC RIGHT SHOULDER PAIN: ICD-10-CM

## 2023-07-10 DIAGNOSIS — M54.12 CERVICAL RADICULOPATHY: ICD-10-CM

## 2023-07-10 DIAGNOSIS — Z13.6 ENCOUNTER FOR SCREENING FOR CARDIOVASCULAR DISORDERS: ICD-10-CM

## 2023-07-10 DIAGNOSIS — F17.200 TOBACCO USE DISORDER: ICD-10-CM

## 2023-07-10 DIAGNOSIS — G56.03 BILATERAL CARPAL TUNNEL SYNDROME: ICD-10-CM

## 2023-07-10 DIAGNOSIS — Z00.00 ENCOUNTER FOR MEDICAL EXAMINATION TO ESTABLISH CARE: ICD-10-CM

## 2023-07-10 DIAGNOSIS — Z00.00 HEALTHCARE MAINTENANCE: ICD-10-CM

## 2023-07-10 DIAGNOSIS — E78.2 MIXED HYPERLIPIDEMIA: ICD-10-CM

## 2023-07-10 DIAGNOSIS — F10.11 H/O ALCOHOL ABUSE: ICD-10-CM

## 2023-07-10 DIAGNOSIS — Z00.00 ANNUAL PHYSICAL EXAM: Primary | ICD-10-CM

## 2023-07-10 LAB
ALBUMIN SERPL BCP-MCNC: 3.8 G/DL (ref 3.5–5.2)
ALP SERPL-CCNC: 65 U/L (ref 55–135)
ALT SERPL W/O P-5'-P-CCNC: 19 U/L (ref 10–44)
ANION GAP SERPL CALC-SCNC: 5 MMOL/L (ref 8–16)
AST SERPL-CCNC: 21 U/L (ref 10–40)
BASOPHILS # BLD AUTO: 0.03 K/UL (ref 0–0.2)
BASOPHILS NFR BLD: 0.5 % (ref 0–1.9)
BILIRUB SERPL-MCNC: 0.5 MG/DL (ref 0.1–1)
BUN SERPL-MCNC: 14 MG/DL (ref 6–20)
CALCIUM SERPL-MCNC: 9.4 MG/DL (ref 8.7–10.5)
CHLORIDE SERPL-SCNC: 110 MMOL/L (ref 95–110)
CHOLEST SERPL-MCNC: 267 MG/DL (ref 120–199)
CHOLEST/HDLC SERPL: 4.4 {RATIO} (ref 2–5)
CO2 SERPL-SCNC: 24 MMOL/L (ref 23–29)
COMPLEXED PSA SERPL-MCNC: 0.95 NG/ML (ref 0–4)
CREAT SERPL-MCNC: 0.8 MG/DL (ref 0.5–1.4)
DIFFERENTIAL METHOD: ABNORMAL
EOSINOPHIL # BLD AUTO: 0.2 K/UL (ref 0–0.5)
EOSINOPHIL NFR BLD: 3.8 % (ref 0–8)
ERYTHROCYTE [DISTWIDTH] IN BLOOD BY AUTOMATED COUNT: 13.9 % (ref 11.5–14.5)
EST. GFR  (NO RACE VARIABLE): >60 ML/MIN/1.73 M^2
ESTIMATED AVG GLUCOSE: 103 MG/DL (ref 68–131)
FOLATE SERPL-MCNC: 9.9 NG/ML (ref 4–24)
GLUCOSE SERPL-MCNC: 92 MG/DL (ref 70–110)
HBA1C MFR BLD: 5.2 % (ref 4–5.6)
HCT VFR BLD AUTO: 44.2 % (ref 40–54)
HDLC SERPL-MCNC: 61 MG/DL (ref 40–75)
HDLC SERPL: 22.8 % (ref 20–50)
HGB BLD-MCNC: 14.9 G/DL (ref 14–18)
IMM GRANULOCYTES # BLD AUTO: 0.05 K/UL (ref 0–0.04)
IMM GRANULOCYTES NFR BLD AUTO: 0.8 % (ref 0–0.5)
LDLC SERPL CALC-MCNC: 187.8 MG/DL (ref 63–159)
LYMPHOCYTES # BLD AUTO: 2.1 K/UL (ref 1–4.8)
LYMPHOCYTES NFR BLD: 33.6 % (ref 18–48)
MCH RBC QN AUTO: 33.7 PG (ref 27–31)
MCHC RBC AUTO-ENTMCNC: 33.7 G/DL (ref 32–36)
MCV RBC AUTO: 100 FL (ref 82–98)
MONOCYTES # BLD AUTO: 0.7 K/UL (ref 0.3–1)
MONOCYTES NFR BLD: 10.3 % (ref 4–15)
NEUTROPHILS # BLD AUTO: 3.2 K/UL (ref 1.8–7.7)
NEUTROPHILS NFR BLD: 51 % (ref 38–73)
NONHDLC SERPL-MCNC: 206 MG/DL
NRBC BLD-RTO: 0 /100 WBC
PLATELET # BLD AUTO: 242 K/UL (ref 150–450)
PMV BLD AUTO: 9.4 FL (ref 9.2–12.9)
POTASSIUM SERPL-SCNC: 4.5 MMOL/L (ref 3.5–5.1)
PROT SERPL-MCNC: 6.7 G/DL (ref 6–8.4)
RBC # BLD AUTO: 4.42 M/UL (ref 4.6–6.2)
SODIUM SERPL-SCNC: 139 MMOL/L (ref 136–145)
TRIGL SERPL-MCNC: 91 MG/DL (ref 30–150)
TSH SERPL DL<=0.005 MIU/L-ACNC: 1.22 UIU/ML (ref 0.4–4)
VIT B12 SERPL-MCNC: 684 PG/ML (ref 210–950)
WBC # BLD AUTO: 6.34 K/UL (ref 3.9–12.7)

## 2023-07-10 PROCEDURE — 36415 COLL VENOUS BLD VENIPUNCTURE: CPT | Performed by: STUDENT IN AN ORGANIZED HEALTH CARE EDUCATION/TRAINING PROGRAM

## 2023-07-10 PROCEDURE — 84443 ASSAY THYROID STIM HORMONE: CPT | Performed by: STUDENT IN AN ORGANIZED HEALTH CARE EDUCATION/TRAINING PROGRAM

## 2023-07-10 PROCEDURE — 3008F PR BODY MASS INDEX (BMI) DOCUMENTED: ICD-10-PCS | Mod: CPTII,S$GLB,, | Performed by: STUDENT IN AN ORGANIZED HEALTH CARE EDUCATION/TRAINING PROGRAM

## 2023-07-10 PROCEDURE — 3075F PR MOST RECENT SYSTOLIC BLOOD PRESS GE 130-139MM HG: ICD-10-PCS | Mod: CPTII,S$GLB,, | Performed by: STUDENT IN AN ORGANIZED HEALTH CARE EDUCATION/TRAINING PROGRAM

## 2023-07-10 PROCEDURE — 3044F PR MOST RECENT HEMOGLOBIN A1C LEVEL <7.0%: ICD-10-PCS | Mod: CPTII,S$GLB,, | Performed by: STUDENT IN AN ORGANIZED HEALTH CARE EDUCATION/TRAINING PROGRAM

## 2023-07-10 PROCEDURE — 1160F PR REVIEW ALL MEDS BY PRESCRIBER/CLIN PHARMACIST DOCUMENTED: ICD-10-PCS | Mod: CPTII,S$GLB,, | Performed by: STUDENT IN AN ORGANIZED HEALTH CARE EDUCATION/TRAINING PROGRAM

## 2023-07-10 PROCEDURE — 3080F DIAST BP >= 90 MM HG: CPT | Mod: CPTII,S$GLB,, | Performed by: STUDENT IN AN ORGANIZED HEALTH CARE EDUCATION/TRAINING PROGRAM

## 2023-07-10 PROCEDURE — 99999 PR PBB SHADOW E&M-EST. PATIENT-LVL V: ICD-10-PCS | Mod: PBBFAC,,, | Performed by: STUDENT IN AN ORGANIZED HEALTH CARE EDUCATION/TRAINING PROGRAM

## 2023-07-10 PROCEDURE — 99396 PR PREVENTIVE VISIT,EST,40-64: ICD-10-PCS | Mod: S$GLB,,, | Performed by: STUDENT IN AN ORGANIZED HEALTH CARE EDUCATION/TRAINING PROGRAM

## 2023-07-10 PROCEDURE — 1159F PR MEDICATION LIST DOCUMENTED IN MEDICAL RECORD: ICD-10-PCS | Mod: CPTII,S$GLB,, | Performed by: STUDENT IN AN ORGANIZED HEALTH CARE EDUCATION/TRAINING PROGRAM

## 2023-07-10 PROCEDURE — 99999 PR PBB SHADOW E&M-EST. PATIENT-LVL V: CPT | Mod: PBBFAC,,, | Performed by: STUDENT IN AN ORGANIZED HEALTH CARE EDUCATION/TRAINING PROGRAM

## 2023-07-10 PROCEDURE — 84153 ASSAY OF PSA TOTAL: CPT | Performed by: STUDENT IN AN ORGANIZED HEALTH CARE EDUCATION/TRAINING PROGRAM

## 2023-07-10 PROCEDURE — 3044F HG A1C LEVEL LT 7.0%: CPT | Mod: CPTII,S$GLB,, | Performed by: STUDENT IN AN ORGANIZED HEALTH CARE EDUCATION/TRAINING PROGRAM

## 2023-07-10 PROCEDURE — 82746 ASSAY OF FOLIC ACID SERUM: CPT | Performed by: STUDENT IN AN ORGANIZED HEALTH CARE EDUCATION/TRAINING PROGRAM

## 2023-07-10 PROCEDURE — 3008F BODY MASS INDEX DOCD: CPT | Mod: CPTII,S$GLB,, | Performed by: STUDENT IN AN ORGANIZED HEALTH CARE EDUCATION/TRAINING PROGRAM

## 2023-07-10 PROCEDURE — 99396 PREV VISIT EST AGE 40-64: CPT | Mod: S$GLB,,, | Performed by: STUDENT IN AN ORGANIZED HEALTH CARE EDUCATION/TRAINING PROGRAM

## 2023-07-10 PROCEDURE — 80053 COMPREHEN METABOLIC PANEL: CPT | Performed by: STUDENT IN AN ORGANIZED HEALTH CARE EDUCATION/TRAINING PROGRAM

## 2023-07-10 PROCEDURE — 83036 HEMOGLOBIN GLYCOSYLATED A1C: CPT | Performed by: STUDENT IN AN ORGANIZED HEALTH CARE EDUCATION/TRAINING PROGRAM

## 2023-07-10 PROCEDURE — 85025 COMPLETE CBC W/AUTO DIFF WBC: CPT | Performed by: STUDENT IN AN ORGANIZED HEALTH CARE EDUCATION/TRAINING PROGRAM

## 2023-07-10 PROCEDURE — 3075F SYST BP GE 130 - 139MM HG: CPT | Mod: CPTII,S$GLB,, | Performed by: STUDENT IN AN ORGANIZED HEALTH CARE EDUCATION/TRAINING PROGRAM

## 2023-07-10 PROCEDURE — 3080F PR MOST RECENT DIASTOLIC BLOOD PRESSURE >= 90 MM HG: ICD-10-PCS | Mod: CPTII,S$GLB,, | Performed by: STUDENT IN AN ORGANIZED HEALTH CARE EDUCATION/TRAINING PROGRAM

## 2023-07-10 PROCEDURE — 1159F MED LIST DOCD IN RCRD: CPT | Mod: CPTII,S$GLB,, | Performed by: STUDENT IN AN ORGANIZED HEALTH CARE EDUCATION/TRAINING PROGRAM

## 2023-07-10 PROCEDURE — 1160F RVW MEDS BY RX/DR IN RCRD: CPT | Mod: CPTII,S$GLB,, | Performed by: STUDENT IN AN ORGANIZED HEALTH CARE EDUCATION/TRAINING PROGRAM

## 2023-07-10 PROCEDURE — 80061 LIPID PANEL: CPT | Performed by: STUDENT IN AN ORGANIZED HEALTH CARE EDUCATION/TRAINING PROGRAM

## 2023-07-10 PROCEDURE — 82607 VITAMIN B-12: CPT | Performed by: STUDENT IN AN ORGANIZED HEALTH CARE EDUCATION/TRAINING PROGRAM

## 2023-07-10 RX ORDER — GABAPENTIN 300 MG/1
300 CAPSULE ORAL 3 TIMES DAILY
Qty: 60 CAPSULE | Refills: 1 | Status: SHIPPED | OUTPATIENT
Start: 2023-07-10

## 2023-07-10 RX ORDER — SIMVASTATIN 40 MG/1
40 TABLET, FILM COATED ORAL NIGHTLY
Qty: 30 TABLET | Refills: 11 | Status: SHIPPED | OUTPATIENT
Start: 2023-07-10 | End: 2024-07-09

## 2023-07-10 NOTE — TELEPHONE ENCOUNTER
----- Message from MiguelA ngel Kent sent at 7/10/2023 10:59 AM CDT -----  Regarding: Appt  Contact: [phone]  Calling to schedule from referral DX: G56.03 (ICD-10-CM) - Bilateral carpal tunnel syndrome. Would like to be schedule with the surgery department. Please call for scheduling. Unable to schedule      We spoke  appt booked  Tomorrow with Dr Maguire

## 2023-07-10 NOTE — ASSESSMENT & PLAN NOTE
Chronic alcohol use, for over 30 years.    Currently attempting to quit, drinking only over the weekend.    Attending counseling sessions by weekly.  Continue  Last drink last weekend (drinks 6-8 beers per weekend.    Education provided, highly encouraged to continue to quit drinking.

## 2023-07-10 NOTE — PROGRESS NOTES
Subjective:       Patient ID: James Royden Peabody IV is a 47 y.o. male.    Chief Complaint: No chief complaint on file.    HPI    James Royden Peabody IV is a 47 y.o. male , English speaking, with a history of:  HLD  Alcohol abuse  Chronic active tobacco use  Cervical foraminal stenosis and BUE radiculopathy  BL carpal tunnel Sx  GERD  H/o esophageal stricture s/p dilation EGD in 2019  H/o colon polyps  Chronic right shoulder pain  H/o HSV2 infection      Patient comes to the clinic a general medical health examination and to establish care.    His main concern is bilateral hand pain and numbness.  He has been diagnosed with bilateral carpal tunnel.  He was working hands, builds  devices.     He says his hand pain is increasing and becoming incapacitating, he is worried because it was interfering with his daily activities and with work.      In addition, he has been seen for his cervical radiculopathy, latest EMG was in 2021, he has been seen by Orthopedics and has had physical therapy with some improvement.    He has been managed conservatively, however in his latest appointment by ortho as per the surgeon's note he is a candidate for C5-7 ACDF if he fails conservative management.    He has prescriptions for gabapentin and Flexeril which he takes as needed.  He also takes meloxicam as needed.      Patient has a long history of alcohol use (more than 30 years).  He tried to quit drinking at age 37, he stopped for 3 years and then he resumed drinking but not as much as he used to drink before.  He is going to counseling sessions twice a week to try to decrease/quit drinking.    Currently he drinks on the weekend 6-8 beers with friends.    He says he is rounded with people that smoke and drink every day, and this includes coworkers.      Has also smoked since age 13 1 pack per day for 34 years.    His mother was a smoker and also used alcohol on daily basis.    He says that alcohol makes him more calm and  relaxed, and that he leaves a stressful life because of work, he is having to work under stress and meet a lot of deadlines    In addition, he wants to lose some weight, he has noticed that he has gained weight in the past few years.    He is interested in having a better work/life balance, he is motivated to to take better care of himself.      At this time he is not interested in quitting smoking.    Patient denies CV symptoms, CP, SOB, palpitations.  Patient denies constitutional symptoms, fever, changes in the urine or stool.    Today's labs:  CBC Macrocytosis  Plt 242  CMP WNL, normal LFTs  Lipid panel: , HDL 61, , TG 91  A1c: 5.2  Tsh 1.216  PSA 0.95  TSH WNL 2.069    PMH:  Past Medical History:   Diagnosis Date    Carpal tunnel syndrome     Cervical spine degeneration     Cubital tunnel syndrome, right     GERD (gastroesophageal reflux disease)     H/O alcohol abuse     Hyperlipidemia     Tobacco use disorder        PSH:  Past Surgical History:   Procedure Laterality Date    COLONOSCOPY N/A 1/6/2022    Procedure: COLONOSCOPY;  Surgeon: Андрей Obrien MD;  Location: [facility];  Service: Endoscopy;  Laterality: N/A;    EPIDURAL STEROID INJECTION N/A 2/3/2023    Procedure: INJECTION, STEROID, EPIDURAL, C7/T1 CONTRAST DIRECT REF;  Surgeon: Cuauhtemoc Lyman MD;  Location: [facility] [place];  Service: Pain Management;  Laterality: N/A;    ESOPHAGOGASTRODUODENOSCOPY N/A 10/8/2019    Procedure: EGD (ESOPHAGOGASTRODUODENOSCOPY);  Surgeon: Marito Pringle MD;  Location: [address]);  Service: Endoscopy;  Laterality: N/A;    ESOPHAGOGASTRODUODENOSCOPY N/A 1/6/2022    Procedure: EGD (ESOPHAGOGASTRODUODENOSCOPY);  Surgeon: Андрей Obrien MD;  Location: [facility];  Service: Endoscopy;  Laterality: N/A;  fully vaccinated  instructions portal -ml  Rapid covid    HERNIA REPAIR         FH:  Family History   Problem Relation Age of Onset    COPD Mother     Alcohol abuse Mother     Nephrolithiasis Father      Celiac disease Neg Hx     Colon cancer Neg Hx     Colon polyps Neg Hx     Esophageal cancer Neg Hx     Liver cancer Neg Hx     Liver disease Neg Hx     Rectal cancer Neg Hx     Stomach cancer Neg Hx        Allergies: Negative  Review of patient's allergies indicates:  No Known Allergies    Meds:    Current Outpatient Medications:     cyclobenzaprine (FLEXERIL) 10 MG tablet, Take 1 tablet (10 mg total) by mouth 3 (three) times daily as needed for Muscle spasms., Disp: 60 tablet, Rfl: 1    meloxicam (MOBIC) 15 MG tablet, Take 1 tablet (15 mg total) by mouth once daily., Disp: 60 tablet, Rfl: 1    omeprazole (PRILOSEC) 40 MG capsule, Take 1 capsule (40 mg total) by mouth once daily., Disp: 90 capsule, Rfl: 3    gabapentin (NEURONTIN) 300 MG capsule, Take 1 capsule (300 mg total) by mouth 3 (three) times daily., Disp: 60 capsule, Rfl: 1    simvastatin (ZOCOR) 40 MG tablet, Take 1 tablet (40 mg total) by mouth every evening., Disp: 30 tablet, Rfl: 11    valACYclovir (VALTREX) 1000 MG tablet, Take 1 tablet (1,000 mg total) by mouth every 12 (twelve) hours. for 7 days, Disp: 14 tablet, Rfl: 0    Social:   has 2 grown children.    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      Exercise:  Insufficient physical activity  Diet: Regular with no restrictions  Tobacco:  Current active tobacco use of 1 pack per day for 34 years, started smoking at age 13.   Alcohol:  History of alcohol abuse, daily drinking for more than 20 years, quit drinking like that at age 37 for 3 years.    Currently trying to quit drinking, drinks 6-8 drinks every weekend.  Recreational drug use:  Marijuana, mushrooms, occasionally.  Recent travel: Denies    Healthcare Maintenance:  Colonoscopy: 2022  EGD 2022  Vaccinations: Pneumonia (NO), Zoster (no), Tetanus 2016  COVID vaccination: completed x 3  Depression screening: PHQ2 score = 0    Annual visit approx. Month: July ROS  11-point review of systems done. Negative except  for detailed in the HPI.        Objective:          Physical Exam  Vitals and nursing note reviewed.   Constitutional:       Appearance: Normal appearance.      Comments: Strong smell to tobacco   HENT:      Head: Normocephalic and atraumatic.      Right Ear: Tympanic membrane normal.      Left Ear: Tympanic membrane normal.      Nose: Nose normal.      Mouth/Throat:      Mouth: Mucous membranes are moist.      Pharynx: Oropharynx is clear.   Eyes:      Extraocular Movements: Extraocular movements intact.      Conjunctiva/sclera: Conjunctivae normal.      Pupils: Pupils are equal, round, and reactive to light.   Cardiovascular:      Rate and Rhythm: Normal rate and regular rhythm.      Pulses: Normal pulses.      Heart sounds: Normal heart sounds.   Pulmonary:      Effort: Pulmonary effort is normal.      Breath sounds: Normal breath sounds.   Abdominal:      General: Bowel sounds are normal. There is no distension.      Palpations: Abdomen is soft.      Tenderness: There is no abdominal tenderness.   Musculoskeletal:         General: Normal range of motion.      Cervical back: Normal range of motion and neck supple.   Skin:     General: Skin is warm.   Neurological:      General: No focal deficit present.      Mental Status: He is alert and oriented to person, place, and time. Mental status is at baseline.   Psychiatric:         Mood and Affect: Mood normal.          Assessment:       1. Annual physical exam  Assessment & Plan:  Patient is in overall good general health.  Stable medical conditions listed on the PMH.  Labs reviewed and patient notified.        2. Bilateral carpal tunnel syndrome  Assessment & Plan:  Bilateral upper extremity pain, tingling, long history of working with his hands.    Pain and tingling has become incapacitating.    Will refer patient to hand surgery.    Orders:  -     Ambulatory referral/consult to Hand Surgery; Future; Expected date: 07/17/2023    3. Tobacco use disorder  Assessment  & Plan:  Cigarette smoking of 1PPD for 34 years.  I spent 15 minutes in clinic discussing smoking cessation with the patient.    quitting.   I provided resources for smoking cessation including a handout.    At this time patient is not interested in quitting smoking.  We will follow-up at the next clinic visit regarding the progress.      Orders:  -     X-Ray Chest PA And Lateral; Future; Expected date: 07/10/2023    4. H/O alcohol abuse  Assessment & Plan:  Chronic alcohol use, for over 30 years.    Currently attempting to quit, drinking only over the weekend.    Attending counseling sessions by weekly.  Continue  Last drink last weekend (drinks 6-8 beers per weekend.    Education provided, highly encouraged to continue to quit drinking.          5. Mixed hyperlipidemia  Assessment & Plan:  Lipid panel: , HDL 61, , TG 91  H/o poor tolerance to Crestor  Will start patient on simvastatin 40 mg qd, if intolerant will switch to Ezetimibe.  Ca Calcium score ordered    Orders:  -     EKG 12-lead; Future  -     simvastatin (ZOCOR) 40 MG tablet; Take 1 tablet (40 mg total) by mouth every evening.  Dispense: 30 tablet; Refill: 11    6. Encounter for screening for cardiovascular disorders  -     CT Cardiac Scoring; Future; Expected date: 07/10/2023    7. Chronic right shoulder pain  Assessment & Plan:  Referred patient to [facility], patient wants second opinion and more therapy    Orders:  -     Ambulatory referral/consult to Sports Medicine; Future; Expected date: 07/17/2023  -     X-Ray Shoulder Trauma 3 view Right; Future; Expected date: 07/10/2023    8. Healthcare maintenance  Assessment & Plan:  Health care maintenance and core gaps reviewed and assessed with patient.  Vaccinations recommended:        Tetanus - 2016       Shingles - N/A       Influenza - N/A       Pneumonia - O  Colon cancer screening:   Colonoscopy: 2022  Lifestyle recommendations given.  Physical activity recommended.    Legend: Ordered (O),  Declined (D), Current (C)      Orders:  -     Pneumococcal Polysaccharide Vaccine (23 Valent) (SQ/IM)    9. Encounter for medical examination to establish care  Comments:  Will assume as PCP       Plan:       Referral to hand surgery for carpal tunnel.    Referral to sports Medicine for right shoulder pain.    Simvastatin started for hypercholesterolemia.    CT Calcium score ordered.    EKG chest x-ray ordered.    Pneumonia vaccine ordered.  Education provided about alcohol cessation and tobacco cessation.    Continue hospitalizations for alcohol cessation.    Will discuss again tobacco cessation alternatives in next appointment.    Will assume as PCP.    Education provided  Lifestyle recommendations given  AVS generated, explained, and sent to the patient through the patient portal.  RTC in : 6 months for f/u on alcohol use and tobacco use.        ANDRES ALDRICH MD, MPH  Internal Medicine  [facility]  [facility]

## 2023-07-10 NOTE — ASSESSMENT & PLAN NOTE
Lipid panel: , HDL 61, , TG 91  H/o poor tolerance to Crestor  Will start patient on simvastatin 40 mg qd, if intolerant will switch to Ezetimibe.  Ca Calcium score ordered

## 2023-07-10 NOTE — ASSESSMENT & PLAN NOTE
Cigarette smoking of 1PPD for 34 years.  I spent 15 minutes in clinic discussing smoking cessation with the patient.    quitting.   I provided resources for smoking cessation including a handout.    At this time patient is not interested in quitting smoking.  We will follow-up at the next clinic visit regarding the progress.

## 2023-07-10 NOTE — ASSESSMENT & PLAN NOTE
Bilateral upper extremity pain, tingling, long history of working with his hands.    Pain and tingling has become incapacitating.    Will refer patient to hand surgery.

## 2023-07-10 NOTE — ASSESSMENT & PLAN NOTE
Health care maintenance and core gaps reviewed and assessed with patient.  Vaccinations recommended:        Tetanus - 2016       Shingles - N/A       Influenza - N/A       Pneumonia - O  Colon cancer screening:   Colonoscopy: 2022  Lifestyle recommendations given.  Physical activity recommended.    Legend: Ordered (O), Declined (D), Current (C)

## 2023-07-11 ENCOUNTER — OFFICE VISIT (OUTPATIENT)
Dept: ORTHOPEDICS | Facility: CLINIC | Age: 48
End: 2023-07-11
Payer: COMMERCIAL

## 2023-07-11 ENCOUNTER — HOSPITAL ENCOUNTER (OUTPATIENT)
Dept: RADIOLOGY | Facility: HOSPITAL | Age: 48
Discharge: HOME | End: 2023-07-11
Attending: ORTHOPAEDIC SURGERY
Payer: COMMERCIAL

## 2023-07-11 DIAGNOSIS — G56.03 BILATERAL CARPAL TUNNEL SYNDROME: Primary | ICD-10-CM

## 2023-07-11 DIAGNOSIS — M25.522 BILATERAL ELBOW JOINT PAIN: ICD-10-CM

## 2023-07-11 DIAGNOSIS — M25.531 BILATERAL WRIST PAIN: ICD-10-CM

## 2023-07-11 DIAGNOSIS — M25.521 BILATERAL ELBOW JOINT PAIN: ICD-10-CM

## 2023-07-11 DIAGNOSIS — M25.532 BILATERAL WRIST PAIN: ICD-10-CM

## 2023-07-11 DIAGNOSIS — G56.23 CUBITAL TUNNEL SYNDROME, BILATERAL: ICD-10-CM

## 2023-07-11 PROCEDURE — 99204 PR OFFICE/OUTPT VISIT, NEW, LEVL IV, 45-59 MIN: ICD-10-PCS | Mod: S$GLB,,, | Performed by: ORTHOPAEDIC SURGERY

## 2023-07-11 PROCEDURE — 99999 PR PBB SHADOW E&M-EST. PATIENT-LVL I: ICD-10-PCS | Mod: PBBFAC,,, | Performed by: ORTHOPAEDIC SURGERY

## 2023-07-11 PROCEDURE — 99204 OFFICE O/P NEW MOD 45 MIN: CPT | Mod: S$GLB,,, | Performed by: ORTHOPAEDIC SURGERY

## 2023-07-11 PROCEDURE — 3044F PR MOST RECENT HEMOGLOBIN A1C LEVEL <7.0%: ICD-10-PCS | Mod: CPTII,S$GLB,, | Performed by: ORTHOPAEDIC SURGERY

## 2023-07-11 PROCEDURE — 99999 PR PBB SHADOW E&M-EST. PATIENT-LVL I: CPT | Mod: PBBFAC,,, | Performed by: ORTHOPAEDIC SURGERY

## 2023-07-11 PROCEDURE — 3044F HG A1C LEVEL LT 7.0%: CPT | Mod: CPTII,S$GLB,, | Performed by: ORTHOPAEDIC SURGERY

## 2023-07-11 RX ORDER — MELOXICAM 15 MG/1
15 TABLET ORAL DAILY
Qty: 60 TABLET | Refills: 1 | Status: SHIPPED | OUTPATIENT
Start: 2023-07-11

## 2023-07-11 RX ORDER — OMEPRAZOLE 40 MG/1
40 CAPSULE, DELAYED RELEASE ORAL DAILY
Qty: 90 CAPSULE | Refills: 3 | Status: SHIPPED | OUTPATIENT
Start: 2023-07-11 | End: 2024-07-10

## 2023-07-11 NOTE — PROGRESS NOTES
[facility]  History & Physical  Orthopedics    SUBJECTIVE:      COVID-19 attestation:  This patient was treated during the COVID-19 pandemic.  This was discussed with the patient, they are aware of our current policies and procedures, were given the option of delaying their visit and or switching to a virtual visit, delaying their surgery when applicable, and they elect to proceed.    Chief Complaint:  Right hand, elbow pain; left hand pain.    Referring Provider: N/A     History of Present Illness:  Patient is a 47 y.o. right hand dominant male who presents today with complaints of right hand and elbow pain, and left hand pain.  Patient says that he has had his right hand pain for approximately 15 years.  He describes his pain as numbness, tingling, shock-like pain that occurs from his medial elbow down into his entire hand on the right side.  On the left his pain is primarily in the left hand though he does experience isn't the elbow at times.  He states that his pain is aggravated when he works and rides motorcycles.  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  Over the past several weeks he has been slowing at work, and has noticed an improvement in his symptoms as a result of some rest.  He states that he got an EMG earlier this year at the discretion of Dr. Hill.  According to the patient this EMG demonstrated right-sided carpal tunnel but did not have evidence of right-sided cubital tunnel.  The patient does not believe that this report was accurate and he is willing to get another EMG. He has also tried physical therapy at the [facility], which he wishes to get away from and pursue other treatment strategies.  He is interested in scheduling surgery while he has some time and has not incredibly busy with work.    The patient pills [employer] for [employer].  He is a business owner    Onset of symptoms/DOI was years  ago.    Symptoms are aggravated by activity, movement, and driving.    Symptoms are alleviated by rest.    Symptoms consist of numbness/tingling.    The patient rates their pain as a 4/10.    Attempted treatment(s) and/or interventions include activity modifications, rest, rest, activity modification, anti-inflammatory medications, and physical and/or occupational therapy.     The patient denies any fevers, chills, N/V, D/C and presents for evaluation.       Past Medical History:   Diagnosis Date    Carpal tunnel syndrome     Cervical spine degeneration     Cubital tunnel syndrome, right     GERD (gastroesophageal reflux disease)     H/O alcohol abuse     Hyperlipidemia     Tobacco use disorder      Past Surgical History:   Procedure Laterality Date    COLONOSCOPY N/A 1/6/2022    Procedure: COLONOSCOPY;  Surgeon: Андрей Obrien MD;  Location: [facility];  Service: Endoscopy;  Laterality: N/A;    EPIDURAL STEROID INJECTION N/A 2/3/2023    Procedure: INJECTION, STEROID, EPIDURAL, C7/T1 CONTRAST DIRECT REF;  Surgeon: Cuauhtemoc Lyman MD;  Location: [facility];  Service: Pain Management;  Laterality: N/A;    ESOPHAGOGASTRODUODENOSCOPY N/A 10/8/2019    Procedure: EGD (ESOPHAGOGASTRODUODENOSCOPY);  Surgeon: Marito Pringle MD;  Location: [facility] ([address] [place] [address]);  Service: Endoscopy;  Laterality: N/A;    ESOPHAGOGASTRODUODENOSCOPY N/A 1/6/2022    Procedure: EGD (ESOPHAGOGASTRODUODENOSCOPY);  Surgeon: Андрей Obrien MD;  Location: [facility];  Service: Endoscopy;  Laterality: N/A;  fully vaccinated  instructions portal -ml  Rapid covid    HERNIA REPAIR       Review of patient's allergies indicates:  No Known Allergies  Social History     Social History Narrative     and repair. M x 9, 3 kids     Family History   Problem Relation Age of Onset    COPD Mother     Alcohol abuse Mother     Nephrolithiasis Father     Celiac disease Neg Hx     Colon cancer Neg Hx     Colon polyps Neg Hx     Esophageal cancer Neg Hx     Liver  cancer Neg Hx     Liver disease Neg Hx     Rectal cancer Neg Hx     Stomach cancer Neg Hx          Current Outpatient Medications:     cyclobenzaprine (FLEXERIL) 10 MG tablet, Take 1 tablet (10 mg total) by mouth 3 (three) times daily as needed for Muscle spasms., Disp: 60 tablet, Rfl: 1    gabapentin (NEURONTIN) 300 MG capsule, Take 1 capsule (300 mg total) by mouth 3 (three) times daily., Disp: 60 capsule, Rfl: 1    meloxicam (MOBIC) 15 MG tablet, Take 1 tablet (15 mg total) by mouth once daily., Disp: 60 tablet, Rfl: 1    omeprazole (PRILOSEC) 40 MG capsule, Take 1 capsule (40 mg total) by mouth once daily., Disp: 90 capsule, Rfl: 3    simvastatin (ZOCOR) 40 MG tablet, Take 1 tablet (40 mg total) by mouth every evening., Disp: 30 tablet, Rfl: 11    valACYclovir (VALTREX) 1000 MG tablet, Take 1 tablet (1,000 mg total) by mouth every 12 (twelve) hours. for 7 days, Disp: 14 tablet, Rfl: 0      Review of Systems:  As per HPI otherwise noncontributory    OBJECTIVE:      Vital Signs (Most Recent):  There were no vitals filed for this visit.  There is no height or weight on file to calculate BMI.      Physical Exam:  Constitutional: The patient appears well-developed and well-nourished. No distress.   Skin: No lesions appreciated  Head: Normocephalic and atraumatic.   Nose: Nose normal.   Ears: No deformities seen  Eyes: Conjunctivae and EOM are normal.   Neck: No tracheal deviation present.   Cardiovascular: Normal rate and intact distal pulses.    Pulmonary/Chest: Effort normal. No respiratory distress.   Abdominal: There is no guarding.   Neurological: The patient is alert.   Psychiatric: The patient has a normal mood and affect.     Bilateral Hand/Wrist Examination:    Observation/Inspection:  Swelling  none    Deformity  none  Discoloration  none     Scars   none    Atrophy  None    HAND/WRIST EXAMINATION:  Finkelstein's Test   Neg  WHAT Test    Neg  Snuff box tenderness   Neg  Casey's Test    Neg  Hook of [place]ate  Tenderness  Neg  CMC grind    Neg  Circumduction test   Neg    Neurovascular Exam:  Digits WWP, brisk CR < 3s throughout  NVI motor/LTS to M/R/U nerves, radial pulse 2+  Tinel's Test - Carpal Tunnel  positive on the right  Tinel's Test - Cubital Tunnel  positive on the right  Phalen's Test    N/a  Median Nerve Compression Test positive bilaterally    ROM hand full, painless    ROM wrist full, painless    ROM elbow full, painless    Abdomen not guarded  Respirations nonlabored  Perfusion intact    Diagnostic Results:     Imaging - no new imaging obtained today.      EMG - EMG completed earlier this year demonstrated C6-C7 radiculopathy per another provider's note.    ASSESSMENT/PLAN:      47 y.o. yo male with right elbow, hand pain.  Patient has also been seen for C6-7 radiculopathy which he states has resolved in his current symptoms of numbness and tingling that radiate from his right medial elbow to his entire right hand are different than the pain that he would experienced prior to seeing a spine surgeon.     Plan: The patient and I had a thorough discussion today.  We discussed the working diagnosis as well as several other potential alternative diagnoses.  Treatment options were discussed, both conservative and surgical.  Conservative treatment options would include things such as activity modifications, workplace modifications, a period of rest, oral vs topical OTC and prescription anti-inflammatory medications, occupational therapy, splinting/bracing, immobilization, corticosteroid injections, and others.  Surgical options were discussed as well.     I explained to the patient that given his previous diagnosis of cervical radiculopathy, it is important to obtain an EMG in order to better characterize the anatomic location of his nerve compression.  We talked about the overlapping symptoms between cervical radiculopathy and cubital, carpal tunnel syndrome.  There is a possibility that decompression of his  ulnar and median nerves at the cubital and carpal tunnels, respectively, will not provide relief of his symptoms if they are in fact coming from his C-spine.  At this time, the patient would like to obtain a new EMG within [facility] and return to clinic to review the results prior to proceeding with treatment.  He will schedule a follow-up appointment once he has obtained the EMG and we will happily see him back in clinic.    Should the patient's symptoms worsen, persist, or fail to improve they should return for reevaluation and I would be happy to see them back anytime.    DAYO Miles MD  Orthopaedic Surgery   Resident Physician, PGY-1  07/11/2023

## 2023-07-13 ENCOUNTER — HOSPITAL ENCOUNTER (OUTPATIENT)
Dept: RADIOLOGY | Facility: HOSPITAL | Age: 48
Discharge: HOME | End: 2023-07-13
Attending: STUDENT IN AN ORGANIZED HEALTH CARE EDUCATION/TRAINING PROGRAM
Payer: COMMERCIAL

## 2023-07-13 ENCOUNTER — HOSPITAL ENCOUNTER (OUTPATIENT)
Dept: RADIOLOGY | Facility: HOSPITAL | Age: 48
Discharge: HOME | End: 2023-07-13
Attending: PHYSICIAN ASSISTANT
Payer: COMMERCIAL

## 2023-07-13 ENCOUNTER — HOSPITAL ENCOUNTER (OUTPATIENT)
Dept: CARDIOLOGY | Facility: CLINIC | Age: 48
Discharge: HOME | End: 2023-07-13
Payer: COMMERCIAL

## 2023-07-13 ENCOUNTER — OFFICE VISIT (OUTPATIENT)
Dept: ORTHOPEDICS | Facility: CLINIC | Age: 48
End: 2023-07-13
Payer: COMMERCIAL

## 2023-07-13 DIAGNOSIS — M25.511 CHRONIC RIGHT SHOULDER PAIN: ICD-10-CM

## 2023-07-13 DIAGNOSIS — G89.29 CHRONIC RIGHT SHOULDER PAIN: ICD-10-CM

## 2023-07-13 DIAGNOSIS — E78.2 MIXED HYPERLIPIDEMIA: ICD-10-CM

## 2023-07-13 DIAGNOSIS — M75.51 BURSITIS OF RIGHT SHOULDER: Primary | ICD-10-CM

## 2023-07-13 DIAGNOSIS — M25.521 RIGHT ELBOW PAIN: ICD-10-CM

## 2023-07-13 DIAGNOSIS — Z13.6 ENCOUNTER FOR SCREENING FOR CARDIOVASCULAR DISORDERS: ICD-10-CM

## 2023-07-13 DIAGNOSIS — M25.311 INSTABILITY OF RIGHT SHOULDER JOINT: ICD-10-CM

## 2023-07-13 PROCEDURE — 75571 CT CALCIUM SCORING CARDIAC: ICD-10-PCS | Mod: 26,,, | Performed by: RADIOLOGY

## 2023-07-13 PROCEDURE — 73030 X-RAY EXAM OF SHOULDER: CPT | Mod: 26,RT,, | Performed by: RADIOLOGY

## 2023-07-13 PROCEDURE — 1159F PR MEDICATION LIST DOCUMENTED IN MEDICAL RECORD: ICD-10-PCS | Mod: CPTII,S$GLB,, | Performed by: PHYSICIAN ASSISTANT

## 2023-07-13 PROCEDURE — 99214 PR OFFICE/OUTPT VISIT, EST, LEVL IV, 30-39 MIN: ICD-10-PCS | Mod: 25,S$GLB,, | Performed by: PHYSICIAN ASSISTANT

## 2023-07-13 PROCEDURE — 1159F MED LIST DOCD IN RCRD: CPT | Mod: CPTII,S$GLB,, | Performed by: PHYSICIAN ASSISTANT

## 2023-07-13 PROCEDURE — 73030 XR SHOULDER COMPLETE 2 OR MORE VIEWS RIGHT: ICD-10-PCS | Mod: 26,RT,, | Performed by: RADIOLOGY

## 2023-07-13 PROCEDURE — 3044F PR MOST RECENT HEMOGLOBIN A1C LEVEL <7.0%: ICD-10-PCS | Mod: CPTII,S$GLB,, | Performed by: PHYSICIAN ASSISTANT

## 2023-07-13 PROCEDURE — 73080 X-RAY EXAM OF ELBOW: CPT | Mod: TC,RT

## 2023-07-13 PROCEDURE — 3044F HG A1C LEVEL LT 7.0%: CPT | Mod: CPTII,S$GLB,, | Performed by: PHYSICIAN ASSISTANT

## 2023-07-13 PROCEDURE — 1160F RVW MEDS BY RX/DR IN RCRD: CPT | Mod: CPTII,S$GLB,, | Performed by: PHYSICIAN ASSISTANT

## 2023-07-13 PROCEDURE — 75571 CT HRT W/O DYE W/CA TEST: CPT | Mod: TC

## 2023-07-13 PROCEDURE — 99999 PR PBB SHADOW E&M-EST. PATIENT-LVL IV: ICD-10-PCS | Mod: PBBFAC,,, | Performed by: PHYSICIAN ASSISTANT

## 2023-07-13 PROCEDURE — 93010 EKG 12-LEAD: ICD-10-PCS | Mod: S$GLB,,, | Performed by: INTERNAL MEDICINE

## 2023-07-13 PROCEDURE — 93010 ELECTROCARDIOGRAM REPORT: CPT | Mod: S$GLB,,, | Performed by: INTERNAL MEDICINE

## 2023-07-13 PROCEDURE — 93005 ELECTROCARDIOGRAM TRACING: CPT | Mod: S$GLB,,, | Performed by: STUDENT IN AN ORGANIZED HEALTH CARE EDUCATION/TRAINING PROGRAM

## 2023-07-13 PROCEDURE — 1160F PR REVIEW ALL MEDS BY PRESCRIBER/CLIN PHARMACIST DOCUMENTED: ICD-10-PCS | Mod: CPTII,S$GLB,, | Performed by: PHYSICIAN ASSISTANT

## 2023-07-13 PROCEDURE — 20610 DRAIN/INJ JOINT/BURSA W/O US: CPT | Mod: RT,S$GLB,, | Performed by: PHYSICIAN ASSISTANT

## 2023-07-13 PROCEDURE — 73080 XR ELBOW COMPLETE 3 VIEW RIGHT: ICD-10-PCS | Mod: 26,RT,, | Performed by: RADIOLOGY

## 2023-07-13 PROCEDURE — 73030 X-RAY EXAM OF SHOULDER: CPT | Mod: TC,RT

## 2023-07-13 PROCEDURE — 75571 CT HRT W/O DYE W/CA TEST: CPT | Mod: 26,,, | Performed by: RADIOLOGY

## 2023-07-13 PROCEDURE — 99999 PR PBB SHADOW E&M-EST. PATIENT-LVL IV: CPT | Mod: PBBFAC,,, | Performed by: PHYSICIAN ASSISTANT

## 2023-07-13 PROCEDURE — 99214 OFFICE O/P EST MOD 30 MIN: CPT | Mod: 25,S$GLB,, | Performed by: PHYSICIAN ASSISTANT

## 2023-07-13 PROCEDURE — 73080 X-RAY EXAM OF ELBOW: CPT | Mod: 26,RT,, | Performed by: RADIOLOGY

## 2023-07-13 PROCEDURE — 93005 EKG 12-LEAD: ICD-10-PCS | Mod: S$GLB,,, | Performed by: STUDENT IN AN ORGANIZED HEALTH CARE EDUCATION/TRAINING PROGRAM

## 2023-07-13 PROCEDURE — 20610 PR DRAIN/INJECT LARGE JOINT/BURSA: ICD-10-PCS | Mod: RT,S$GLB,, | Performed by: PHYSICIAN ASSISTANT

## 2023-07-13 RX ORDER — TRIAMCINOLONE ACETONIDE 40 MG/ML
40 INJECTION, SUSPENSION INTRA-ARTICULAR; INTRAMUSCULAR
Status: COMPLETED | OUTPATIENT
Start: 2023-07-13 | End: 2023-07-13

## 2023-07-13 RX ADMIN — TRIAMCINOLONE ACETONIDE 40 MG: 40 INJECTION, SUSPENSION INTRA-ARTICULAR; INTRAMUSCULAR at 09:07

## 2023-07-13 NOTE — PROGRESS NOTES
SUBJECTIVE:     Chief Complaint & History of Present Illness:  James Royden Peabody IV is a  Established  patient 47 y.o. male who is seen here today with a complaint of    Chief Complaint   Patient presents with    Right Shoulder - Pain    Right Elbow - Pain    .  He is patient well-known to me was last seen treated clinic 02/09/2022 for lateral epicondylitis for which she is recovered very well.  Currently being followed by Spine Service as well as Hand Service for carpal tunnel syndrome and cervical radiculopathy his concerns today revolve around sudden onset pain difficulty with certain motions following a hyperextension injury of his right shoulder about 6 months ago.  He has been treating with gentle range of motion exercise and meloxicam with poor results  On a scale of 1-10, with 10 being worst pain imaginable, he rates this pain as 4 on good days and 7 on bad days.  he describes the pain as tender and sore.    Review of patient's allergies indicates:  No Known Allergies      Current Outpatient Medications   Medication Sig Dispense Refill    cyclobenzaprine (FLEXERIL) 10 MG tablet Take 1 tablet (10 mg total) by mouth 3 (three) times daily as needed for Muscle spasms. 60 tablet 1    omeprazole (PRILOSEC) 40 MG capsule Take 1 capsule (40 mg total) by mouth once daily. 90 capsule 3    simvastatin (ZOCOR) 40 MG tablet Take 1 tablet (40 mg total) by mouth every evening. 30 tablet 11    gabapentin (NEURONTIN) 300 MG capsule Take 1 capsule (300 mg total) by mouth 3 (three) times daily. (Patient not taking: Reported on 7/13/2023) 60 capsule 1    meloxicam (MOBIC) 15 MG tablet Take 1 tablet (15 mg total) by mouth once daily. (Patient not taking: Reported on 7/13/2023) 60 tablet 1    valACYclovir (VALTREX) 1000 MG tablet Take 1 tablet (1,000 mg total) by mouth every 12 (twelve) hours. for 7 days 14 tablet 0     Current Facility-Administered Medications   Medication Dose Route Frequency Provider Last Rate Last Admin     triamcinolone acetonide injection 40 mg  40 mg Intra-articular 1 time in Clinic/HOD Leonides Ballard PA-C           Past Medical History:   Diagnosis Date    Carpal tunnel syndrome     Cervical spine degeneration     Cubital tunnel syndrome, right     GERD (gastroesophageal reflux disease)     H/O alcohol abuse     Hyperlipidemia     Tobacco use disorder        Past Surgical History:   Procedure Laterality Date    COLONOSCOPY N/A 1/6/2022    Procedure: COLONOSCOPY;  Surgeon: Андрей Obrien MD;  Location: [facility] END[facility];  Service: Endoscopy;  Laterality: N/A;    EPIDURAL STEROID INJECTION N/A 2/3/2023    Procedure: INJECTION, STEROID, EPIDURAL, C7/T1 CONTRAST DIRECT REF;  Surgeon: Cuauhtemoc Lyman MD;  Location: [facility];  Service: Pain Management;  Laterality: N/A;    ESOPHAGOGASTRODUODENOSCOPY N/A 10/8/2019    Procedure: EGD (ESOPHAGOGASTRODUODENOSCOPY);  Surgeon: Marito Pringle MD;  Location: [address]);  Service: Endoscopy;  Laterality: N/A;    ESOPHAGOGASTRODUODENOSCOPY N/A 1/6/2022    Procedure: EGD (ESOPHAGOGASTRODUODENOSCOPY);  Surgeon: Андрей Obrien MD;  Location: [facility];  Service: Endoscopy;  Laterality: N/A;  fully vaccinated  instructions portal -ml  Rapid covid    HERNIA REPAIR         Vital Signs (Most Recent)  There were no vitals filed for this visit.    Review of Systems:  ROS:  Constitutional: no fever or chills  Eyes: no visual changes  ENT: no nasal congestion or sore throat  Respiratory: no cough or shortness of breath  Cardiovascular: no chest pain or palpitations  Gastrointestinal: no nausea or vomiting, tolerating diet, Positive for GERD, esophageal stricture  Genitourinary: no hematuria or dysuria  Integument/Breast: no rash or pruritis  Hematologic/Lymphatic: no easy bruising or lymphadenopathy  Musculoskeletal: no arthralgias or myalgias  Neurological: no seizures or tremors  Behavioral/Psych: no auditory or visual hallucinations  Endocrine: no heat or cold  intolerance      OBJECTIVE:     PHYSICAL EXAM:     , General Appearance: Well nourished, well developed, in no acute distress.  Neurological: Mood & affect are normal.  Shoulder exam: right  Tenderness: biceps tendon, lateral acromial, posterior acromial, trapezius muscle  ROM: forward flexion 180/180, extension 45/45, full abduction 180/180, abduction-glenohumeral 90/90, external rotation 50/50, pain at the extremes of mobility  Shoulder Strength: biceps 5/5, triceps 5/5, abduction 5/5, adduction 5/5, external rotation 5/5 with shoulder at side, flexion 5/5, and extension 5/5  negative for tenderness about the glenohumeral joint, positive for tenderness over the acromioclavicular joint, and negative for impingement sign  Stability tests: anterior apprehension test negative and posterior apprehension test positive for pain only  Special Tests:Cross-chest abduction: diffuse pain and Sanders's test: pain greater with pronation                     RADIOGRAPHS:  X-rays of the shoulder taken today films reviewed by me demonstrate no evidence of fracture dislocation mild glenohumeral joint space narrowing and degenerative changes noted at the AC joint    ASSESSMENT/PLAN:       ICD-10-CM ICD-9-CM   1. Bursitis of right shoulder  M75.51 726.10   2. Chronic right shoulder pain  M25.511 719.41    G89.29 338.29   3. Right elbow pain  M25.521 719.42   4. Instability of right shoulder joint  M25.311 718.81       Plan: We discussed with the patient at length all the different treatment options available for his right shoulder including anti-inflammatories, acetaminophen, rest, ice, Physical therapy to include strengthening exercise, occasional cortisone injections for temporary relief, arthroscopic surgical repair, and finally shoulder arthroplasty.   Will proceed with therapeutic diagnostic cortisone injection of the right shoulder followed by course of physical therapy       The risks, benefits, pros, cons, and potential side  effects of the procedure were discussed with the patient in detail all questions were answered.  The patient is comfortable and willing to proceed with the procedure. Verbal consent was obtained and the proper joint was identified by the patient and provider      The injection site was identified and the skin was prepared with an ETOH solution. The    right  shoulder was injected with 1 ml of Kenalog and 5 ml Lidocaine under sterile technique. James Royden Peabody IV tolerated the procedure well, he was advised to rest the  shoulder  today, ice and support. he did receive immediate relief of the pain in and about his  shoulder  he was told this would be short lived and is secondary to the lidocaine. he may have an increase in his discomfort tonight followed by steady improvement over the next several days. It may take 1-3 weeks following the injection to get the full benefit of the medication.  I will see him back in 4-6 months. Sooner if he has any problems or concerns.

## 2023-07-14 ENCOUNTER — PATIENT MESSAGE (OUTPATIENT)
Dept: INTERNAL MEDICINE | Facility: CLINIC | Age: 48
End: 2023-07-14
Payer: COMMERCIAL

## 2023-07-19 ENCOUNTER — HOSPITAL ENCOUNTER (OUTPATIENT)
Dept: RADIOLOGY | Facility: OTHER | Age: 48
Discharge: HOME | End: 2023-07-19
Attending: STUDENT IN AN ORGANIZED HEALTH CARE EDUCATION/TRAINING PROGRAM
Payer: COMMERCIAL

## 2023-07-19 DIAGNOSIS — F17.200 TOBACCO USE DISORDER: ICD-10-CM

## 2023-07-19 PROCEDURE — 71046 X-RAY EXAM CHEST 2 VIEWS: CPT | Mod: 26,,, | Performed by: RADIOLOGY

## 2023-07-19 PROCEDURE — 71046 X-RAY EXAM CHEST 2 VIEWS: CPT | Mod: TC,FY

## 2023-07-19 PROCEDURE — 71046 XR CHEST PA AND LATERAL: ICD-10-PCS | Mod: 26,,, | Performed by: RADIOLOGY

## 2023-07-24 ENCOUNTER — PROCEDURE VISIT (OUTPATIENT)
Dept: NEUROLOGY | Facility: CLINIC | Age: 48
End: 2023-07-24
Payer: COMMERCIAL

## 2023-07-24 DIAGNOSIS — G56.03 BILATERAL CARPAL TUNNEL SYNDROME: ICD-10-CM

## 2023-07-24 DIAGNOSIS — G56.23 CUBITAL TUNNEL SYNDROME, BILATERAL: ICD-10-CM

## 2023-07-24 PROCEDURE — 95912 NRV CNDJ TEST 11-12 STUDIES: CPT | Mod: S$GLB,,, | Performed by: PHYSICAL MEDICINE & REHABILITATION

## 2023-07-24 PROCEDURE — 95885 PR MUSC TST DONE W/NERV TST LIM: ICD-10-PCS | Mod: 59,S$GLB,, | Performed by: PHYSICAL MEDICINE & REHABILITATION

## 2023-07-24 PROCEDURE — 95886 PR EMG COMPLETE, W/ NERVE CONDUCTION STUDIES, 5+ MUSCLES: ICD-10-PCS | Mod: S$GLB,,, | Performed by: PHYSICAL MEDICINE & REHABILITATION

## 2023-07-24 PROCEDURE — 95886 MUSC TEST DONE W/N TEST COMP: CPT | Mod: S$GLB,,, | Performed by: PHYSICAL MEDICINE & REHABILITATION

## 2023-07-24 PROCEDURE — 95912 PR NERVE CONDUCTION STUDY; 11 -12 STUDIES: ICD-10-PCS | Mod: S$GLB,,, | Performed by: PHYSICAL MEDICINE & REHABILITATION

## 2023-07-24 PROCEDURE — 95885 MUSC TST DONE W/NERV TST LIM: CPT | Mod: 59,S$GLB,, | Performed by: PHYSICAL MEDICINE & REHABILITATION

## 2023-07-24 NOTE — PROCEDURES
Test Date:  2023    Patient: peabody, james  : 1975 Physician: Maximo Watson D.O.   ID#: [id number] SEX: Male Ref. Phys: Johnny Maguire MD     HPI: James Royden Peabody IV is a 47 y.o.male who presents for NCS/EMG to evaluate for median and ulnar neuropathies as well as cervical radiculopathy.      NCV & EMG Findings:  Evaluation of the right median sensory nerve showed prolonged distal peak latency and decreased conduction velocity.  The left Median-Radial (Dig I) sensory nerve showed abnormal peak latency difference ((Median Wrist-Dig I)-(Radial Wrist-Dig I)).  All remaining nerves (as indicated in the following tables) were within normal limits.  All examined muscles (as indicated in the following table) showed no evidence of electrical instability.    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 Carpal tunnel syndrome).  There is no motor axonal loss.  There is no active denervation.  This is graded as Mild in severity bilaterally.    No electrophysiologic evidence of an ulnar neuropathy on either side.  There is no definitive evidence of a cervical radiculopathy on today's electrophysiologic study.  Please note that there are a few caveats to consider with radiculopathy as it relates to NCS/EMG testing.  For cervical radiculopathy, EMG does not evaluate radiculopathy cephalad to C5 well.  Also, NCS is often normal in radiculopathy, so we rely on the needle EMG for diagnosis.  The needle EMG will also be normal in purely demyelinating radiculopathy lesions, in predominant sensory nerve root/dorsal root lesions, and in some cases hyperacute lesions.  In these cases, the EMG/NCS will be normal, and can miss radiculopathy.  Sensitivity for needle EMG is estimated to be between 50%-71% for cervical radiculopathy, so ruling out radiculopathy solely with NCS/EMG shouldn't be considered if there is a reasonable clinical  suspicion.           ___________________________  Maximo Watson D.O.        NCS+  Motor Nerve Results      Latency Amplitude F-Lat Segment Distance CV Comment   Site (ms) Norm (mV) Norm (ms)  (cm) (m/s) Norm    Left Median (APB)   Wrist 4.2  < 4.6 6.1  > 4.2  Wrist-Palm - - -    Elbow 8.6 - 4.2 -  Elbow-Wrist 23 52  > 47    Right Median (APB)   Wrist 4.0  < 4.6 5.2  > 4.2  Wrist-Palm - - -    Elbow 7.9 - 4.8 -  Elbow-Wrist 24 62  > 47    Left Ulnar (ADM)   Wrist 2.8  < 3.7 8.4  > 3.0         Bel Elbow 6.6 - 8.5 -  Bel Elbow-Wrist 24 63  > 52    Abv Elbow 7.8 - 8.2 -  Abv Elbow-Bel Elbow 10 83  > 43    Right Ulnar (ADM)   Wrist 2.7  < 3.7 12.1  > 3.0         Bel Elbow 6.6 - 12.1 -  Bel Elbow-Wrist 24 62  > 52    Abv Elbow 7.7 - 11.5 -  Abv Elbow-Bel Elbow 9 82  > 43    Right Ulnar (FDI)   Wrist 4.1 - 11.7 -         Bel Elbow 7.7 - 11.2 -  Bel Elbow-Wrist 24 67  > 52    Abv Elbow 9.1 - 11.0 -  Abv Elbow-Bel Elbow 9 64  > 43      Sensory Nerve Results      Latency (Peak) Amplitude (P-P) Segment Distance CV Comment   Site (ms) Norm (µV) Norm  (cm) (m/s) Norm    Left Median   Wrist-Dig I 3.3 - 6 - Wrist-Dig I 10 30 -    Wrist-Dig II 3.7  < 4.0 18  > 13 Wrist-Dig II 15 41  > 39    Palm-Wrist 2.4 - 12 - Palm-Wrist - - -    Right Median   Wrist-Dig II *4.1  < 4.0 19  > 13 Wrist-Dig II 14 *34  > 39    Left Ulnar   Wrist-Dig V 2.8  < 4.0 25  > 8 Wrist-Dig V 14 50  > 38    Palm-Wrist 2.4 - 15 - Palm-Wrist - - -    Right Ulnar   Wrist-Dig V 2.7  < 4.0 36  > 8 Wrist-Dig V 14 52  > 38    Right Dorsal Ulnar Cutaneous   Wrist-Dorsum 5th MC 2.1 - 30 - Wrist-Dorsum 5th MC - - -    Left Radial   Wrist-Dig I 2.7 - 7 - Wrist-Dig I 10 37 -    Right Radial   Forearm-Wrist 1.95  < 2.8 25  > 11 Forearm-Wrist 10 51 -      Inter-Nerve Comparisons     Nerve 1 Value 1 Nerve 2 Value 2 Parameter Result Normal   Sensory Sites   L Median Wrist-Dig I 3.3 ms L Radial Wrist-Dig I 2.7 ms Peak Lat Diff *0.60 ms <0.40   L Median Palm-Wrist 2.4 ms L  Ulnar Palm-Wrist 2.4 ms Peak Lat Diff 0.00 ms <0.30     EMG+     Side Muscle Nerve Root Ins Act Fibs Psw Amp Dur Poly Recrt Int Pat Comment   Right Deltoid Axillary C5-C6 Nml Nml Nml Nml Nml 0 Nml Nml    Right Biceps Musculocut C5-C6 Nml Nml Nml Nml Nml 0 Nml Nml    Right Triceps Radial C6-C8 Nml Nml Nml Nml Nml 0 Nml Nml    Right Pronator Teres Median C6-C7 Nml Nml Nml Nml Nml 0 Nml Nml    Right FDI Ulnar C8-T1 Nml Nml Nml Nml Nml 0 Nml Nml    Right APB Median C8-T1 Nml Nml Nml Nml Nml 0 Nml Nml    Left APB Median C8-T1 Nml Nml Nml Nml Nml 0 Nml Nml            Waveforms:    Motor              Sensory

## 2023-08-10 ENCOUNTER — CLINICAL SUPPORT (OUTPATIENT)
Dept: REHABILITATION | Facility: HOSPITAL | Age: 48
End: 2023-08-10
Payer: COMMERCIAL

## 2023-08-10 DIAGNOSIS — M25.311 INSTABILITY OF RIGHT SHOULDER JOINT: ICD-10-CM

## 2023-08-10 DIAGNOSIS — M75.51 BURSITIS OF RIGHT SHOULDER: ICD-10-CM

## 2023-08-10 PROCEDURE — 97161 PT EVAL LOW COMPLEX 20 MIN: CPT | Mod: PO

## 2023-08-10 PROCEDURE — 97110 THERAPEUTIC EXERCISES: CPT | Mod: PO
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
## 2023-08-18 ENCOUNTER — CLINICAL SUPPORT (OUTPATIENT)
Dept: REHABILITATION | Facility: HOSPITAL | Age: 48
End: 2023-08-18
Payer: COMMERCIAL

## 2023-08-18 DIAGNOSIS — M25.311 INSTABILITY OF RIGHT SHOULDER JOINT: ICD-10-CM

## 2023-08-18 DIAGNOSIS — M25.511 CHRONIC RIGHT SHOULDER PAIN: Primary | ICD-10-CM

## 2023-08-18 DIAGNOSIS — M25.311 INSTABILITY OF RIGHT SHOULDER JOINT: Primary | ICD-10-CM

## 2023-08-18 DIAGNOSIS — G89.29 CHRONIC RIGHT SHOULDER PAIN: Primary | ICD-10-CM

## 2023-08-18 PROCEDURE — 97140 MANUAL THERAPY 1/> REGIONS: CPT | Mod: PO

## 2023-08-18 PROCEDURE — 97110 THERAPEUTIC EXERCISES: CPT | Mod: PO

## 2023-08-18 PROCEDURE — 97112 NEUROMUSCULAR REEDUCATION: CPT | Mod: PO

## 2023-08-18 NOTE — PROGRESS NOTES
"  [facility]   Physical Therapy Treatment Note      Name: James Royden Peabody IV  Clinic Number: [id number]    Therapy Diagnosis:   Encounter Diagnosis   Name Primary?    Instability of right shoulder joint Yes     Physician: Conrad Hill MD    Visit Date: 8/18/2023    Physician Orders: PT Eval and Treat  Medical Diagnosis from Referral:   M75.51 (ICD-10-CM) - Bursitis of right shoulder   M25.311 (ICD-10-CM) - Instability of right shoulder joint   Evaluation Date: 8/10/2023  Authorization Period Expiration: 12/31/2023  Plan of Care Expiration: 11/30/2023  Progress Note Due: 9/10/2023  Visit # / Visits authorized: 14/ 40   FOTO: 1/ 3     Precautions: Standard      Time In: 800  Time Out: 845  Total Billable Time: 45 minutes    PTA Visit #: 0/5     Subjective     Pt reports: Pt reports that his shoulder was in much more pain after .  He was compliant with home exercise program.  Response to previous treatment: initial evaluation  Functional change: increased pain with HEP    Pain: 6/10  Location: right shoulder      Objective      Objective Measures updated at progress report unless specified.     Treatment     Tracie received the treatments listed below:      therapeutic exercises to develop strength, endurance, and ROM for 15 minutes including:  Shoulder isometrics (flexion, abduction, EXTERNAL ROTATION, INTERNAL ROTATION) x20 with 3" holds  Wall shoulder taps 2x8 each side VC for serratus activation    manual therapy techniques: Joint mobilizations were applied to the: R shoulder for 15 minutes, including:  Gentle shoulder traction with oscillation    neuromuscular re-education activities to improve: Coordination for 08 minutes. The following activities were included:  Rhythmic stabilization with arm at side, elbows at 90 deg 3'  Bodyblade arm at side, elbow at 90 deg 4x30"  Serratus punches 3x10    Patient Education and Home Exercises       Education provided:   - Shoulder pathology " differential diagnoses    Written Home Exercises Provided: Patient instructed to cont prior HEP. Exercises were reviewed and Tracie was able to demonstrate them prior to the end of the session.  Tracie demonstrated good  understanding of the education provided. See EMR under Patient Instructions for exercises provided during therapy sessions    Assessment     Patient tolerated first follow-up session well today. Home exercise program given to patient by physical therapist prompted increased painted is comfort at the shoulder, so PT regressed exercises to isometrics in order to reduce irritation and irritability at the shoulder. Given patients level of function, and pain with active range of motion and passive range of motion, PT believes that shoulder injury could be secondary to dysfunction at the labrum. Patients overall level of function with activities of daily living and work requirements are severely impacted by his shoulder. PT to continue to progress as tolerated, but will mostly be performing interventions for pain relief PRN.    Tracie Is progressing well towards his goals.   Pt prognosis is Good.     Pt will continue to benefit from skilled outpatient physical therapy to address the deficits listed in the problem list box on initial evaluation, provide pt/family education and to maximize pt's level of independence in the home and community environment.     Pt's spiritual, cultural and educational needs considered and pt agreeable to plan of care and goals.     Anticipated barriers to physical therapy: scheduling, chronicity of symptoms    Goals:     SHORT TERM GOALS:  4 weeks 8/10/2023   Recent signs and systems trend is improving in order to progress towards LTG's.     Patient will be independent with HEP in order to further progress and return to maximal function.     Pain rating at Worst: 5/10 in order to progress towards increased independence with activity.     Patient will be able to correct  postural deviations in sitting and standing, to decrease pain and promote postural awareness for injury prevention.         LONG TERM GOALS: 10 weeks 8/10/2023   Patient will return to normal ADL, recreational, and work related activities with less pain and limitation.      Patient will improve AROM to stated goals in order to return to maximal functional potential.      Patient will improve Strength to stated goals of appropriate musculature in order to improve functional independence.      Pain Rating at Best: 1/10 to improve Quality of Life.      Patient will meet predicted functional outcome (FOTO) score: 28% to increase self-worth & perceived functional ability.     Patient will have met/partially met personal goal of: Returning to work with max pain 2/10 in order to demonstrate return to work and PLOF.             Plan     Continue to progress per FRANCI Harrell, PT

## 2023-08-23 ENCOUNTER — CLINICAL SUPPORT (OUTPATIENT)
Dept: REHABILITATION | Facility: HOSPITAL | Age: 48
End: 2023-08-23
Payer: COMMERCIAL

## 2023-08-23 DIAGNOSIS — M75.51 BURSITIS OF RIGHT SHOULDER: Primary | ICD-10-CM

## 2023-08-23 PROCEDURE — 97110 THERAPEUTIC EXERCISES: CPT | Mod: PO

## 2023-08-23 PROCEDURE — 97112 NEUROMUSCULAR REEDUCATION: CPT | Mod: PO

## 2023-08-23 PROCEDURE — 97140 MANUAL THERAPY 1/> REGIONS: CPT | Mod: PO

## 2023-08-23 NOTE — PROGRESS NOTES
"  [facility]   Physical Therapy Treatment Note      Name: James Royden Peabody IV  Clinic Number: [id number]    Therapy Diagnosis:   Encounter Diagnosis   Name Primary?    Bursitis of right shoulder Yes       Physician: Conrad Hill MD    Visit Date: 2023    Physician Orders: PT Eval and Treat  Medical Diagnosis from Referral:   M75.51 (ICD-10-CM) - Bursitis of right shoulder   M25.311 (ICD-10-CM) - Instability of right shoulder joint   Evaluation Date: 8/10/2023  Authorization Period Expiration: 2023  Plan of Care Expiration: 2023  Progress Note Due: 9/10/2023  Visit # / Visits authorized: 15/ 40   FOTO: 1/ 3     Precautions: Standard      Time In: 800  Time Out: 845  Total Billable Time: 45 minutes    PTA Visit #: 0/5     Subjective     Pt reports: Pt reports that his dog  on Monday and he's not doing too great.  He was compliant with home exercise program.  Response to previous treatment: initial evaluation  Functional change: increased pain with HEP    Pain: 6/10  Location: right shoulder      Objective      Objective Measures updated at progress report unless specified.     Treatment     Tracie received the treatments listed below:      therapeutic exercises to develop strength, endurance, and ROM for 15 minutes including:    Shoulder isometrics (flexion, abduction, EXTERNAL ROTATION, INTERNAL ROTATION) x20 with 3" holds  Wall shoulder taps 2x8 each side VC for serratus activation  Isometric walk outs with yellow theraband 2x10 for external rotator isometric contraction    manual therapy techniques: Joint mobilizations were applied to the: R shoulder for 15 minutes, including:  Gentle shoulder traction with oscillation    neuromuscular re-education activities to improve: Coordination for 15 minutes. The following activities were included:  Rhythmic stabilization with arm at side, elbows at 90 deg 3'  Bodyblade arm at side, elbow at 90 deg 4x30"  D2 flexion " yellow theraband 2x10    Patient Education and Home Exercises       Education provided:   - Shoulder pathology differential diagnoses    Written Home Exercises Provided: Patient instructed to cont prior HEP. Exercises were reviewed and Tracie was able to demonstrate them prior to the end of the session.  Tracie demonstrated good  understanding of the education provided. See EMR under Patient Instructions for exercises provided during therapy sessions    Assessment     Patient tolerated session well today. Irritability at the shoulder is reduced as compared to previous session, but overall shoulder instability remains. PT today progressed exercises to allow for open chain motion without pain. PT to continue to manage patients shoulder pain and instability until he receives his MRI. Continue to progress    Tracie Is progressing well towards his goals.   Pt prognosis is Good.     Pt will continue to benefit from skilled outpatient physical therapy to address the deficits listed in the problem list box on initial evaluation, provide pt/family education and to maximize pt's level of independence in the home and community environment.     Pt's spiritual, cultural and educational needs considered and pt agreeable to plan of care and goals.     Anticipated barriers to physical therapy: scheduling, chronicity of symptoms    Goals:     SHORT TERM GOALS:  4 weeks 8/10/2023   Recent signs and systems trend is improving in order to progress towards LTG's.     Patient will be independent with HEP in order to further progress and return to maximal function.     Pain rating at Worst: 5/10 in order to progress towards increased independence with activity.     Patient will be able to correct postural deviations in sitting and standing, to decrease pain and promote postural awareness for injury prevention.         LONG TERM GOALS: 10 weeks 8/10/2023   Patient will return to normal ADL, recreational, and work related activities with  less pain and limitation.      Patient will improve AROM to stated goals in order to return to maximal functional potential.      Patient will improve Strength to stated goals of appropriate musculature in order to improve functional independence.      Pain Rating at Best: 1/10 to improve Quality of Life.      Patient will meet predicted functional outcome (FOTO) score: 28% to increase self-worth & perceived functional ability.     Patient will have met/partially met personal goal of: Returning to work with max pain 2/10 in order to demonstrate return to work and PLOF.             Plan     Continue to progress per FRANCI Harrell, PT

## 2023-08-28 ENCOUNTER — OFFICE VISIT (OUTPATIENT)
Dept: ORTHOPEDICS | Facility: CLINIC | Age: 48
End: 2023-08-28
Payer: COMMERCIAL

## 2023-08-28 VITALS — BODY MASS INDEX: 28.88 KG/M2 | HEIGHT: 69 IN | WEIGHT: 195 LBS

## 2023-08-28 DIAGNOSIS — G56.00 CTS (CARPAL TUNNEL SYNDROME): ICD-10-CM

## 2023-08-28 DIAGNOSIS — G56.01 CARPAL TUNNEL SYNDROME OF RIGHT WRIST: Primary | ICD-10-CM

## 2023-08-28 DIAGNOSIS — G56.21 CUBITAL TUNNEL SYNDROME, RIGHT: ICD-10-CM

## 2023-08-28 PROCEDURE — 99214 PR OFFICE/OUTPT VISIT, EST, LEVL IV, 30-39 MIN: ICD-10-PCS | Mod: S$GLB,,, | Performed by: ORTHOPAEDIC SURGERY

## 2023-08-28 PROCEDURE — 99214 OFFICE O/P EST MOD 30 MIN: CPT | Mod: S$GLB,,, | Performed by: ORTHOPAEDIC SURGERY

## 2023-08-28 PROCEDURE — 1159F MED LIST DOCD IN RCRD: CPT | Mod: CPTII,S$GLB,, | Performed by: ORTHOPAEDIC SURGERY

## 2023-08-28 PROCEDURE — 3008F BODY MASS INDEX DOCD: CPT | Mod: CPTII,S$GLB,, | Performed by: ORTHOPAEDIC SURGERY

## 2023-08-28 PROCEDURE — 3008F PR BODY MASS INDEX (BMI) DOCUMENTED: ICD-10-PCS | Mod: CPTII,S$GLB,, | Performed by: ORTHOPAEDIC SURGERY

## 2023-08-28 PROCEDURE — 3044F HG A1C LEVEL LT 7.0%: CPT | Mod: CPTII,S$GLB,, | Performed by: ORTHOPAEDIC SURGERY

## 2023-08-28 PROCEDURE — 3044F PR MOST RECENT HEMOGLOBIN A1C LEVEL <7.0%: ICD-10-PCS | Mod: CPTII,S$GLB,, | Performed by: ORTHOPAEDIC SURGERY

## 2023-08-28 PROCEDURE — 1159F PR MEDICATION LIST DOCUMENTED IN MEDICAL RECORD: ICD-10-PCS | Mod: CPTII,S$GLB,, | Performed by: ORTHOPAEDIC SURGERY

## 2023-08-28 PROCEDURE — 99999 PR PBB SHADOW E&M-EST. PATIENT-LVL III: ICD-10-PCS | Mod: PBBFAC,,, | Performed by: ORTHOPAEDIC SURGERY

## 2023-08-28 PROCEDURE — 99999 PR PBB SHADOW E&M-EST. PATIENT-LVL III: CPT | Mod: PBBFAC,,, | Performed by: ORTHOPAEDIC SURGERY

## 2023-08-28 RX ORDER — MUPIROCIN 20 MG/G
OINTMENT TOPICAL
Status: CANCELLED | OUTPATIENT
Start: 2023-08-28

## 2023-08-28 NOTE — H&P (VIEW-ONLY)
[facility]  History & Physical  Orthopedics     SUBJECTIVE:       COVID-19 attestation:  This patient was treated during the COVID-19 pandemic.  This was discussed with the patient, they are aware of our current policies and procedures, were given the option of delaying their visit and or switching to a virtual visit, delaying their surgery when applicable, and they elect to proceed.     Chief Complaint:  Right hand, elbow pain; left hand pain.     Referring Provider: N/A      History of Present Illness:  Patient is a 48 y.o. right hand dominant male who presents today with complaints of right hand and elbow pain, and left hand pain.  Patient says that he has had his right hand pain for approximately 15 years.  He describes his pain as numbness, tingling, shock-like pain that occurs from his medial elbow down into his entire hand on the right side.  On the left his pain is primarily in the left hand though he does experience isn't the elbow at times.  He states that his pain is aggravated when he works and rides motorcycles.  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  Over the past several weeks he has been slowing at work, and has noticed an improvement in his symptoms as a result of some rest.  He states that he got an EMG earlier this year at the discretion of Dr. Hill.  According to the patient this EMG demonstrated right-sided carpal tunnel but did not have evidence of right-sided cubital tunnel.  The patient does not believe that this report was accurate and he is willing to get another EMG. He has also tried physical therapy at the [facility], which he wishes to get away from and pursue other treatment strategies.  He is interested in scheduling surgery while he has some time and has not incredibly busy with work.     Interval history August 28, 2023: The patient returns today for re-evaluation.  Notes that he is still really suffering with  numbness and tingling in the right hand which is worst to the long ring and small fingers.  He had a recent EMG and returns for those results and re-evaluation with no other complaints today.     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     Symptoms are aggravated by activity, movement, and driving.     Symptoms are alleviated by rest.     Symptoms consist of numbness/tingling.     The patient rates their pain as a 4/10.     Attempted treatment(s) and/or interventions include activity modifications, rest, rest, activity modification, anti-inflammatory medications, and physical and/or occupational therapy.     The patient denies any fevers, chills, N/V, D/C and presents for evaluation.             Past Medical History:   Diagnosis Date    Carpal tunnel syndrome      Cervical spine degeneration      Cubital tunnel syndrome, right      GERD (gastroesophageal reflux disease)      H/O alcohol abuse      Hyperlipidemia      Tobacco use disorder              Past Surgical History:   Procedure Laterality Date    COLONOSCOPY N/A 1/6/2022     Procedure: COLONOSCOPY;  Surgeon: Андрей Obrien MD;  Location: [facility];  Service: Endoscopy;  Laterality: N/A;    EPIDURAL STEROID INJECTION N/A 2/3/2023     Procedure: INJECTION, STEROID, EPIDURAL, C7/T1 CONTRAST DIRECT REF;  Surgeon: Cuauhtemoc Lyman MD;  Location: [facility];  Service: Pain Management;  Laterality: N/A;    ESOPHAGOGASTRODUODENOSCOPY N/A 10/8/2019     Procedure: EGD (ESOPHAGOGASTRODUODENOSCOPY);  Surgeon: Marito Pringle MD;  Location: [address]);  Service: Endoscopy;  Laterality: N/A;    ESOPHAGOGASTRODUODENOSCOPY N/A 1/6/2022     Procedure: EGD (ESOPHAGOGASTRODUODENOSCOPY);  Surgeon: Андрей Obrien MD;  Location: [facility];  Service: Endoscopy;  Laterality: N/A;  fully vaccinated  instructions portal -ml  Rapid covid    HERNIA REPAIR          Review of patient's allergies  "indicates:  No Known Allergies  Social History          Social History Narrative      and repair. M x 9, 3 kids            Family History   Problem Relation Age of Onset    COPD Mother      Alcohol abuse Mother      Nephrolithiasis Father      Celiac disease Neg Hx      Colon cancer Neg Hx      Colon polyps Neg Hx      Esophageal cancer Neg Hx      Liver cancer Neg Hx      Liver disease Neg Hx      Rectal cancer Neg Hx      Stomach cancer Neg Hx              Current Outpatient Medications:     cyclobenzaprine (FLEXERIL) 10 MG tablet, Take 1 tablet (10 mg total) by mouth 3 (three) times daily as needed for Muscle spasms., Disp: 60 tablet, Rfl: 1    gabapentin (NEURONTIN) 300 MG capsule, Take 1 capsule (300 mg total) by mouth 3 (three) times daily. (Patient not taking: Reported on 7/13/2023), Disp: 60 capsule, Rfl: 1    meloxicam (MOBIC) 15 MG tablet, Take 1 tablet (15 mg total) by mouth once daily. (Patient not taking: Reported on 7/13/2023), Disp: 60 tablet, Rfl: 1    omeprazole (PRILOSEC) 40 MG capsule, Take 1 capsule (40 mg total) by mouth once daily., Disp: 90 capsule, Rfl: 3    simvastatin (ZOCOR) 40 MG tablet, Take 1 tablet (40 mg total) by mouth every evening., Disp: 30 tablet, Rfl: 11    valACYclovir (VALTREX) 1000 MG tablet, Take 1 tablet (1,000 mg total) by mouth every 12 (twelve) hours. for 7 days, Disp: 14 tablet, Rfl: 0        Review of Systems:  As per HPI otherwise noncontributory     OBJECTIVE:       Vital Signs (Most Recent):  Vitals       Vitals:     08/28/23 1353   Weight: 88.5 kg (195 lb)   Height: 5' 9" (1.753 m)         Body mass index is 28.8 kg/m².        Physical Exam:  Constitutional: The patient appears well-developed and well-nourished. No distress.   Skin: No lesions appreciated  Head: Normocephalic and atraumatic.   Nose: Nose normal.   Ears: No deformities seen  Eyes: Conjunctivae and EOM are normal.   Neck: No tracheal deviation present.   Cardiovascular: Normal rate and " intact distal pulses.    Pulmonary/Chest: Effort normal. No respiratory distress.   Abdominal: There is no guarding.   Neurological: The patient is alert.   Psychiatric: The patient has a normal mood and affect.      Bilateral Hand/Wrist Examination:     Observation/Inspection:  Swelling                       none                  Deformity                     none  Discoloration               none                  Scars                           none                  Atrophy                        None     HAND/WRIST EXAMINATION:  Finkelstein's Test                                Neg  WHAT Test                                         Neg  Snuff box tenderness                          Neg  Casey's Test                                     Neg  Hook of Hamate Tenderness              Neg  CMC grind                                           Neg  Circumduction test                              Neg     Neurovascular Exam:  Digits WWP, brisk CR < 3s throughout  NVI motor/LTS to M/R/U nerves, radial pulse 2+  Tinel's Test - Carpal Tunnel                positive on the right  Tinel's Test - Cubital Tunnel               positive on the right  Phalen's Test                                      N/a  Median Nerve Compression Test       positive bilaterally     ROM hand full, painless     ROM wrist full, painless    ROM elbow full, painless     Abdomen not guarded  Respirations nonlabored  Perfusion intact     Diagnostic Results:     Imaging - no new imaging obtained today.       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 Carpal tunnel syndrome).  There is no motor axonal loss.  There is no active denervation.  This is graded as Mild in severity bilaterally.       ASSESSMENT/PLAN:       48 y.o. yo male with right carpal and cubital tunnel syndromes         Plan: The patient and I had a thorough discussion today.  We discussed the working diagnosis as well as  several other potential alternative diagnoses.  Treatment options were discussed, both conservative and surgical.  Conservative treatment options would include things such as activity modifications, workplace modifications, a period of rest, oral vs topical OTC and prescription anti-inflammatory medications, occupational therapy, splinting/bracing, immobilization, corticosteroid injections, and others.  Surgical options were discussed as well.      At this point in time, the patient and I discussed his EMG results.  It did not demonstrate any cubital tunnel syndrome though he has ulnar nerve involvement and a strongly positive Tinel's at the right elbow.  He would like to proceed with a right endoscopic versus open carpal tunnel release as well as a right ulnar nerve decompression at the elbow on September 15, 2023 which I feel is reasonable.  I will get this set up.       The patient has not responded to adequate non operative treatment at this time and/or non operative treatment is not indicated. Thus, the risks, benefits and alternatives to surgery were discussed with the patient in detail.  Specific risks include but are not limited to bleeding, infection, vessel and/or nerve damage, pain, numbness, tingling, complex regional pain syndrome, compartment syndrome, failure to return to pre-injury and/or preoperative functional status, scar sensitivity, delayed healing, inability to return to work, pulley injury, tendon injury, bowstringing, partial and/or incomplete relief of symptoms, weakness, persistence of and/or worsening of symptoms, surgical failure, osteomyelitis, amputation, loss of function, stiffness, functional debility, dysfunction, decreased  strength, need for prolonged postoperative rehabilitation, need for further surgery, deep venous thrombosis, pulmonary embolism, arthritis and death.  The patient states an understanding and wishes to proceed with surgery.   All questions were answered.  No  guarantees were implied or stated.  Written informed consent was obtained.

## 2023-08-28 NOTE — PROGRESS NOTES
[facility]  History & Physical  Orthopedics    SUBJECTIVE:      COVID-19 attestation:  This patient was treated during the COVID-19 pandemic.  This was discussed with the patient, they are aware of our current policies and procedures, were given the option of delaying their visit and or switching to a virtual visit, delaying their surgery when applicable, and they elect to proceed.    Chief Complaint:  Right hand, elbow pain; left hand pain.    Referring Provider: N/A     History of Present Illness:  Patient is a 48 y.o. right hand dominant male who presents today with complaints of right hand and elbow pain, and left hand pain.  Patient says that he has had his right hand pain for approximately 15 years.  He describes his pain as numbness, tingling, shock-like pain that occurs from his medial elbow down into his entire hand on the right side.  On the left his pain is primarily in the left hand though he does experience isn't the elbow at times.  He states that his pain is aggravated when he works and rides motorcycles.  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  Over the past several weeks he has been slowing at work, and has noticed an improvement in his symptoms as a result of some rest.  He states that he got an EMG earlier this year at the discretion of Dr. Hill.  According to the patient this EMG demonstrated right-sided carpal tunnel but did not have evidence of right-sided cubital tunnel.  The patient does not believe that this report was accurate and he is willing to get another EMG. He has also tried physical therapy at the [facility], which he wishes to get away from and pursue other treatment strategies.  He is interested in scheduling surgery while he has some time and has not incredibly busy with work.    Interval history August 28, 2023: The patient returns today for re-evaluation.  Notes that he is still really suffering with numbness  and tingling in the right hand which is worst to the long ring and small fingers.  He had a recent EMG and returns for those results and re-evaluation with no other complaints today.    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    Symptoms are aggravated by activity, movement, and driving.    Symptoms are alleviated by rest.    Symptoms consist of numbness/tingling.    The patient rates their pain as a 4/10.    Attempted treatment(s) and/or interventions include activity modifications, rest, rest, activity modification, anti-inflammatory medications, and physical and/or occupational therapy.     The patient denies any fevers, chills, N/V, D/C and presents for evaluation.       Past Medical History:   Diagnosis Date    Carpal tunnel syndrome     Cervical spine degeneration     Cubital tunnel syndrome, right     GERD (gastroesophageal reflux disease)     H/O alcohol abuse     Hyperlipidemia     Tobacco use disorder      Past Surgical History:   Procedure Laterality Date    COLONOSCOPY N/A 1/6/2022    Procedure: COLONOSCOPY;  Surgeon: Андрей Obrien MD;  Location: [facility];  Service: Endoscopy;  Laterality: N/A;    EPIDURAL STEROID INJECTION N/A 2/3/2023    Procedure: INJECTION, STEROID, EPIDURAL, C7/T1 CONTRAST DIRECT REF;  Surgeon: Cuauhtemoc Lyman MD;  Location: [facility];  Service: Pain Management;  Laterality: N/A;    ESOPHAGOGASTRODUODENOSCOPY N/A 10/8/2019    Procedure: EGD (ESOPHAGOGASTRODUODENOSCOPY);  Surgeon: Marito Pringle MD;  Location: [address]);  Service: Endoscopy;  Laterality: N/A;    ESOPHAGOGASTRODUODENOSCOPY N/A 1/6/2022    Procedure: EGD (ESOPHAGOGASTRODUODENOSCOPY);  Surgeon: Андрей Obrien MD;  Location: [facility];  Service: Endoscopy;  Laterality: N/A;  fully vaccinated  instructions portal -ml  Rapid covid    HERNIA REPAIR       Review of patient's allergies indicates:  No Known Allergies  Social  "History     Social History Narrative     and repair. M x 9, 3 kids     Family History   Problem Relation Age of Onset    COPD Mother     Alcohol abuse Mother     Nephrolithiasis Father     Celiac disease Neg Hx     Colon cancer Neg Hx     Colon polyps Neg Hx     Esophageal cancer Neg Hx     Liver cancer Neg Hx     Liver disease Neg Hx     Rectal cancer Neg Hx     Stomach cancer Neg Hx          Current Outpatient Medications:     cyclobenzaprine (FLEXERIL) 10 MG tablet, Take 1 tablet (10 mg total) by mouth 3 (three) times daily as needed for Muscle spasms., Disp: 60 tablet, Rfl: 1    gabapentin (NEURONTIN) 300 MG capsule, Take 1 capsule (300 mg total) by mouth 3 (three) times daily. (Patient not taking: Reported on 7/13/2023), Disp: 60 capsule, Rfl: 1    meloxicam (MOBIC) 15 MG tablet, Take 1 tablet (15 mg total) by mouth once daily. (Patient not taking: Reported on 7/13/2023), Disp: 60 tablet, Rfl: 1    omeprazole (PRILOSEC) 40 MG capsule, Take 1 capsule (40 mg total) by mouth once daily., Disp: 90 capsule, Rfl: 3    simvastatin (ZOCOR) 40 MG tablet, Take 1 tablet (40 mg total) by mouth every evening., Disp: 30 tablet, Rfl: 11    valACYclovir (VALTREX) 1000 MG tablet, Take 1 tablet (1,000 mg total) by mouth every 12 (twelve) hours. for 7 days, Disp: 14 tablet, Rfl: 0      Review of Systems:  As per HPI otherwise noncontributory    OBJECTIVE:      Vital Signs (Most Recent):  Vitals:    08/28/23 1353   Weight: 88.5 kg (195 lb)   Height: 5' 9" (1.753 m)     Body mass index is 28.8 kg/m².      Physical Exam:  Constitutional: The patient appears well-developed and well-nourished. No distress.   Skin: No lesions appreciated  Head: Normocephalic and atraumatic.   Nose: Nose normal.   Ears: No deformities seen  Eyes: Conjunctivae and EOM are normal.   Neck: No tracheal deviation present.   Cardiovascular: Normal rate and intact distal pulses.    Pulmonary/Chest: Effort normal. No respiratory distress. "   Abdominal: There is no guarding.   Neurological: The patient is alert.   Psychiatric: The patient has a normal mood and affect.     Bilateral Hand/Wrist Examination:    Observation/Inspection:  Swelling  none    Deformity  none  Discoloration  none     Scars   none    Atrophy  None    HAND/WRIST EXAMINATION:  Finkelstein's Test   Neg  WHAT Test    Neg  Snuff box tenderness   Neg  Casey's Test    Neg  Hook of Hamate Tenderness  Neg  CMC grind    Neg  Circumduction test   Neg    Neurovascular Exam:  Digits WWP, brisk CR < 3s throughout  NVI motor/LTS to M/R/U nerves, radial pulse 2+  Tinel's Test - Carpal Tunnel  positive on the right  Tinel's Test - Cubital Tunnel  positive on the right  Phalen's Test    N/a  Median Nerve Compression Test positive bilaterally    ROM hand full, painless    ROM wrist full, painless    ROM elbow full, painless    Abdomen not guarded  Respirations nonlabored  Perfusion intact    Diagnostic Results:     Imaging - no new imaging obtained today.      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 Carpal tunnel syndrome).  There is no motor axonal loss.  There is no active denervation.  This is graded as Mild in severity bilaterally.      ASSESSMENT/PLAN:      48 y.o. yo male with right carpal and cubital tunnel syndromes       Plan: The patient and I had a thorough discussion today.  We discussed the working diagnosis as well as several other potential alternative diagnoses.  Treatment options were discussed, both conservative and surgical.  Conservative treatment options would include things such as activity modifications, workplace modifications, a period of rest, oral vs topical OTC and prescription anti-inflammatory medications, occupational therapy, splinting/bracing, immobilization, corticosteroid injections, and others.  Surgical options were discussed as well.     At this point in time, the patient and I discussed  his EMG results.  It did not demonstrate any cubital tunnel syndrome though he has ulnar nerve involvement and a strongly positive Tinel's at the right elbow.  He would like to proceed with a right endoscopic versus open carpal tunnel release as well as a right ulnar nerve decompression at the elbow on September 15, 2023 which I feel is reasonable.  I will get this set up.      The patient has not responded to adequate non operative treatment at this time and/or non operative treatment is not indicated. Thus, the risks, benefits and alternatives to surgery were discussed with the patient in detail.  Specific risks include but are not limited to bleeding, infection, vessel and/or nerve damage, pain, numbness, tingling, complex regional pain syndrome, compartment syndrome, failure to return to pre-injury and/or preoperative functional status, scar sensitivity, delayed healing, inability to return to work, pulley injury, tendon injury, bowstringing, partial and/or incomplete relief of symptoms, weakness, persistence of and/or worsening of symptoms, surgical failure, osteomyelitis, amputation, loss of function, stiffness, functional debility, dysfunction, decreased  strength, need for prolonged postoperative rehabilitation, need for further surgery, deep venous thrombosis, pulmonary embolism, arthritis and death.  The patient states an understanding and wishes to proceed with surgery.   All questions were answered.  No guarantees were implied or stated.  Written informed consent was obtained.

## 2023-08-28 NOTE — H&P
[facility]  History & Physical  Orthopedics     SUBJECTIVE:       COVID-19 attestation:  This patient was treated during the COVID-19 pandemic.  This was discussed with the patient, they are aware of our current policies and procedures, were given the option of delaying their visit and or switching to a virtual visit, delaying their surgery when applicable, and they elect to proceed.     Chief Complaint:  Right hand, elbow pain; left hand pain.     Referring Provider: N/A      History of Present Illness:  Patient is a 48 y.o. right hand dominant male who presents today with complaints of right hand and elbow pain, and left hand pain.  Patient says that he has had his right hand pain for approximately 15 years.  He describes his pain as numbness, tingling, shock-like pain that occurs from his medial elbow down into his entire hand on the right side.  On the left his pain is primarily in the left hand though he does experience isn't the elbow at times.  He states that his pain is aggravated when he works and rides motorcycles.  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  Over the past several weeks he has been slowing at work, and has noticed an improvement in his symptoms as a result of some rest.  He states that he got an EMG earlier this year at the discretion of Dr. Hill.  According to the patient this EMG demonstrated right-sided carpal tunnel but did not have evidence of right-sided cubital tunnel.  The patient does not believe that this report was accurate and he is willing to get another EMG. He has also tried physical therapy at the [facility], which he wishes to get away from and pursue other treatment strategies.  He is interested in scheduling surgery while he has some time and has not incredibly busy with work.     Interval history August 28, 2023: The patient returns today for re-evaluation.  Notes that he is still really suffering with  numbness and tingling in the right hand which is worst to the long ring and small fingers.  He had a recent EMG and returns for those results and re-evaluation with no other complaints today.     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     Symptoms are aggravated by activity, movement, and driving.     Symptoms are alleviated by rest.     Symptoms consist of numbness/tingling.     The patient rates their pain as a 4/10.     Attempted treatment(s) and/or interventions include activity modifications, rest, rest, activity modification, anti-inflammatory medications, and physical and/or occupational therapy.     The patient denies any fevers, chills, N/V, D/C and presents for evaluation.             Past Medical History:   Diagnosis Date    Carpal tunnel syndrome      Cervical spine degeneration      Cubital tunnel syndrome, right      GERD (gastroesophageal reflux disease)      H/O alcohol abuse      Hyperlipidemia      Tobacco use disorder              Past Surgical History:   Procedure Laterality Date    COLONOSCOPY N/A 1/6/2022     Procedure: COLONOSCOPY;  Surgeon: Андрей Obrien MD;  Location: [facility];  Service: Endoscopy;  Laterality: N/A;    EPIDURAL STEROID INJECTION N/A 2/3/2023     Procedure: INJECTION, STEROID, EPIDURAL, C7/T1 CONTRAST DIRECT REF;  Surgeon: Cuauhtemoc Lyman MD;  Location: [facility] PAIN T;  Service: Pain Management;  Laterality: N/A;    ESOPHAGOGASTRODUODENOSCOPY N/A 10/8/2019     Procedure: EGD (ESOPHAGOGASTRODUODENOSCOPY);  Surgeon: Marito Pringle MD;  Location: [address]);  Service: Endoscopy;  Laterality: N/A;    ESOPHAGOGASTRODUODENOSCOPY N/A 1/6/2022     Procedure: EGD (ESOPHAGOGASTRODUODENOSCOPY);  Surgeon: Андрей Obrien MD;  Location: [facility];  Service: Endoscopy;  Laterality: N/A;  fully vaccinated  instructions portal -ml  Rapid covid    HERNIA REPAIR          Review of patient's allergies  "indicates:  No Known Allergies  Social History          Social History Narrative      and repair. M x 9, 3 kids            Family History   Problem Relation Age of Onset    COPD Mother      Alcohol abuse Mother      Nephrolithiasis Father      Celiac disease Neg Hx      Colon cancer Neg Hx      Colon polyps Neg Hx      Esophageal cancer Neg Hx      Liver cancer Neg Hx      Liver disease Neg Hx      Rectal cancer Neg Hx      Stomach cancer Neg Hx              Current Outpatient Medications:     cyclobenzaprine (FLEXERIL) 10 MG tablet, Take 1 tablet (10 mg total) by mouth 3 (three) times daily as needed for Muscle spasms., Disp: 60 tablet, Rfl: 1    gabapentin (NEURONTIN) 300 MG capsule, Take 1 capsule (300 mg total) by mouth 3 (three) times daily. (Patient not taking: Reported on 7/13/2023), Disp: 60 capsule, Rfl: 1    meloxicam (MOBIC) 15 MG tablet, Take 1 tablet (15 mg total) by mouth once daily. (Patient not taking: Reported on 7/13/2023), Disp: 60 tablet, Rfl: 1    omeprazole (PRILOSEC) 40 MG capsule, Take 1 capsule (40 mg total) by mouth once daily., Disp: 90 capsule, Rfl: 3    simvastatin (ZOCOR) 40 MG tablet, Take 1 tablet (40 mg total) by mouth every evening., Disp: 30 tablet, Rfl: 11    valACYclovir (VALTREX) 1000 MG tablet, Take 1 tablet (1,000 mg total) by mouth every 12 (twelve) hours. for 7 days, Disp: 14 tablet, Rfl: 0        Review of Systems:  As per HPI otherwise noncontributory     OBJECTIVE:       Vital Signs (Most Recent):  Vitals       Vitals:     08/28/23 1353   Weight: 88.5 kg (195 lb)   Height: 5' 9" (1.753 m)         Body mass index is 28.8 kg/m².        Physical Exam:  Constitutional: The patient appears well-developed and well-nourished. No distress.   Skin: No lesions appreciated  Head: Normocephalic and atraumatic.   Nose: Nose normal.   Ears: No deformities seen  Eyes: Conjunctivae and EOM are normal.   Neck: No tracheal deviation present.   Cardiovascular: Normal rate and " intact distal pulses.    Pulmonary/Chest: Effort normal. No respiratory distress.   Abdominal: There is no guarding.   Neurological: The patient is alert.   Psychiatric: The patient has a normal mood and affect.      Bilateral Hand/Wrist Examination:     Observation/Inspection:  Swelling                       none                  Deformity                     none  Discoloration               none                  Scars                           none                  Atrophy                        None     HAND/WRIST EXAMINATION:  Finkelstein's Test                                Neg  WHAT Test                                         Neg  Snuff box tenderness                          Neg  Casey's Test                                     Neg  Hook of Hamate Tenderness              Neg  CMC grind                                           Neg  Circumduction test                              Neg     Neurovascular Exam:  Digits WWP, brisk CR < 3s throughout  NVI motor/LTS to M/R/U nerves, radial pulse 2+  Tinel's Test - Carpal Tunnel                positive on the right  Tinel's Test - Cubital Tunnel               positive on the right  Phalen's Test                                      N/a  Median Nerve Compression Test       positive bilaterally     ROM hand full, painless     ROM wrist full, painless    ROM elbow full, painless     Abdomen not guarded  Respirations nonlabored  Perfusion intact     Diagnostic Results:     Imaging - no new imaging obtained today.       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 Carpal tunnel syndrome).  There is no motor axonal loss.  There is no active denervation.  This is graded as Mild in severity bilaterally.       ASSESSMENT/PLAN:       48 y.o. yo male with right carpal and cubital tunnel syndromes         Plan: The patient and I had a thorough discussion today.  We discussed the working diagnosis as well as  several other potential alternative diagnoses.  Treatment options were discussed, both conservative and surgical.  Conservative treatment options would include things such as activity modifications, workplace modifications, a period of rest, oral vs topical OTC and prescription anti-inflammatory medications, occupational therapy, splinting/bracing, immobilization, corticosteroid injections, and others.  Surgical options were discussed as well.      At this point in time, the patient and I discussed his EMG results.  It did not demonstrate any cubital tunnel syndrome though he has ulnar nerve involvement and a strongly positive Tinel's at the right elbow.  He would like to proceed with a right endoscopic versus open carpal tunnel release as well as a right ulnar nerve decompression at the elbow on September 15, 2023 which I feel is reasonable.  I will get this set up.       The patient has not responded to adequate non operative treatment at this time and/or non operative treatment is not indicated. Thus, the risks, benefits and alternatives to surgery were discussed with the patient in detail.  Specific risks include but are not limited to bleeding, infection, vessel and/or nerve damage, pain, numbness, tingling, complex regional pain syndrome, compartment syndrome, failure to return to pre-injury and/or preoperative functional status, scar sensitivity, delayed healing, inability to return to work, pulley injury, tendon injury, bowstringing, partial and/or incomplete relief of symptoms, weakness, persistence of and/or worsening of symptoms, surgical failure, osteomyelitis, amputation, loss of function, stiffness, functional debility, dysfunction, decreased  strength, need for prolonged postoperative rehabilitation, need for further surgery, deep venous thrombosis, pulmonary embolism, arthritis and death.  The patient states an understanding and wishes to proceed with surgery.   All questions were answered.  No  guarantees were implied or stated.  Written informed consent was obtained.

## 2023-08-30 ENCOUNTER — HOSPITAL ENCOUNTER (OUTPATIENT)
Dept: RADIOLOGY | Facility: OTHER | Age: 48
Discharge: HOME | End: 2023-08-30
Attending: PHYSICIAN ASSISTANT
Payer: COMMERCIAL

## 2023-08-30 DIAGNOSIS — G89.29 CHRONIC RIGHT SHOULDER PAIN: ICD-10-CM

## 2023-08-30 DIAGNOSIS — M25.511 CHRONIC RIGHT SHOULDER PAIN: ICD-10-CM

## 2023-08-30 PROCEDURE — 73221 MRI SHOULDER WITHOUT CONTRAST RIGHT: ICD-10-PCS | Mod: 26,RT,, | Performed by: RADIOLOGY

## 2023-08-30 PROCEDURE — 73221 MRI JOINT UPR EXTREM W/O DYE: CPT | Mod: TC,RT

## 2023-08-30 PROCEDURE — 73221 MRI JOINT UPR EXTREM W/O DYE: CPT | Mod: 26,RT,, | Performed by: RADIOLOGY

## 2023-08-31 ENCOUNTER — PATIENT MESSAGE (OUTPATIENT)
Dept: ORTHOPEDICS | Facility: CLINIC | Age: 48
End: 2023-08-31
Payer: COMMERCIAL

## 2023-09-05 ENCOUNTER — ANESTHESIA EVENT (OUTPATIENT)
Dept: SURGERY | Facility: HOSPITAL | Age: 48
End: 2023-09-05
Payer: COMMERCIAL

## 2023-09-07 ENCOUNTER — OFFICE VISIT (OUTPATIENT)
Dept: SPORTS MEDICINE | Facility: CLINIC | Age: 48
End: 2023-09-07
Payer: COMMERCIAL

## 2023-09-07 VITALS
HEIGHT: 69 IN | WEIGHT: 191.81 LBS | SYSTOLIC BLOOD PRESSURE: 119 MMHG | HEART RATE: 62 BPM | DIASTOLIC BLOOD PRESSURE: 82 MMHG | BODY MASS INDEX: 28.41 KG/M2

## 2023-09-07 DIAGNOSIS — S43.431A SUPERIOR GLENOID LABRUM LESION OF RIGHT SHOULDER, INITIAL ENCOUNTER: Primary | ICD-10-CM

## 2023-09-07 DIAGNOSIS — M67.921 TENDINOPATHY OF RIGHT BICEPS TENDON: ICD-10-CM

## 2023-09-07 PROCEDURE — 3074F SYST BP LT 130 MM HG: CPT | Mod: CPTII,S$GLB,, | Performed by: ORTHOPAEDIC SURGERY

## 2023-09-07 PROCEDURE — 3008F PR BODY MASS INDEX (BMI) DOCUMENTED: ICD-10-PCS | Mod: CPTII,S$GLB,, | Performed by: ORTHOPAEDIC SURGERY

## 2023-09-07 PROCEDURE — 3079F PR MOST RECENT DIASTOLIC BLOOD PRESSURE 80-89 MM HG: ICD-10-PCS | Mod: CPTII,S$GLB,, | Performed by: ORTHOPAEDIC SURGERY

## 2023-09-07 PROCEDURE — 99999 PR PBB SHADOW E&M-EST. PATIENT-LVL III: CPT | Mod: PBBFAC,,, | Performed by: ORTHOPAEDIC SURGERY

## 2023-09-07 PROCEDURE — 3044F HG A1C LEVEL LT 7.0%: CPT | Mod: CPTII,S$GLB,, | Performed by: ORTHOPAEDIC SURGERY

## 2023-09-07 PROCEDURE — 20610 LARGE JOINT ASPIRATION/INJECTION: R GLENOHUMERAL: ICD-10-PCS | Mod: RT,S$GLB,, | Performed by: ORTHOPAEDIC SURGERY

## 2023-09-07 PROCEDURE — 99204 OFFICE O/P NEW MOD 45 MIN: CPT | Mod: 25,S$GLB,, | Performed by: ORTHOPAEDIC SURGERY

## 2023-09-07 PROCEDURE — 1159F MED LIST DOCD IN RCRD: CPT | Mod: CPTII,S$GLB,, | Performed by: ORTHOPAEDIC SURGERY

## 2023-09-07 PROCEDURE — 20610 DRAIN/INJ JOINT/BURSA W/O US: CPT | Mod: RT,S$GLB,, | Performed by: ORTHOPAEDIC SURGERY

## 2023-09-07 PROCEDURE — 3044F PR MOST RECENT HEMOGLOBIN A1C LEVEL <7.0%: ICD-10-PCS | Mod: CPTII,S$GLB,, | Performed by: ORTHOPAEDIC SURGERY

## 2023-09-07 PROCEDURE — 99999 PR PBB SHADOW E&M-EST. PATIENT-LVL III: ICD-10-PCS | Mod: PBBFAC,,, | Performed by: ORTHOPAEDIC SURGERY

## 2023-09-07 PROCEDURE — 3079F DIAST BP 80-89 MM HG: CPT | Mod: CPTII,S$GLB,, | Performed by: ORTHOPAEDIC SURGERY

## 2023-09-07 PROCEDURE — 1159F PR MEDICATION LIST DOCUMENTED IN MEDICAL RECORD: ICD-10-PCS | Mod: CPTII,S$GLB,, | Performed by: ORTHOPAEDIC SURGERY

## 2023-09-07 PROCEDURE — 99204 PR OFFICE/OUTPT VISIT, NEW, LEVL IV, 45-59 MIN: ICD-10-PCS | Mod: 25,S$GLB,, | Performed by: ORTHOPAEDIC SURGERY

## 2023-09-07 PROCEDURE — 3074F PR MOST RECENT SYSTOLIC BLOOD PRESSURE < 130 MM HG: ICD-10-PCS | Mod: CPTII,S$GLB,, | Performed by: ORTHOPAEDIC SURGERY

## 2023-09-07 PROCEDURE — 3008F BODY MASS INDEX DOCD: CPT | Mod: CPTII,S$GLB,, | Performed by: ORTHOPAEDIC SURGERY

## 2023-09-07 RX ADMIN — TRIAMCINOLONE ACETONIDE 40 MG: 40 INJECTION, SUSPENSION INTRA-ARTICULAR; INTRAMUSCULAR at 10:09
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
## 2023-09-08 ENCOUNTER — PATIENT MESSAGE (OUTPATIENT)
Dept: SURGERY | Facility: HOSPITAL | Age: 48
End: 2023-09-08
Payer: COMMERCIAL

## 2023-09-11 RX ORDER — TRIAMCINOLONE ACETONIDE 40 MG/ML
40 INJECTION, SUSPENSION INTRA-ARTICULAR; INTRAMUSCULAR
Status: DISCONTINUED | OUTPATIENT
Start: 2023-09-07 | End: 2023-09-12 | Stop reason: HOSPADM

## 2023-09-14 ENCOUNTER — PATIENT MESSAGE (OUTPATIENT)
Dept: ORTHOPEDICS | Facility: CLINIC | Age: 48
End: 2023-09-14
Payer: COMMERCIAL

## 2023-09-14 ENCOUNTER — TELEPHONE (OUTPATIENT)
Dept: ORTHOPEDICS | Facility: CLINIC | Age: 48
End: 2023-09-14
Payer: COMMERCIAL

## 2023-09-14 RX ORDER — TRAMADOL HYDROCHLORIDE 50 MG/1
50 TABLET ORAL EVERY 6 HOURS
Qty: 20 TABLET | Refills: 0 | Status: SHIPPED | OUTPATIENT
Start: 2023-09-14 | End: 2023-09-25 | Stop reason: SDUPTHER

## 2023-09-14 RX ORDER — IBUPROFEN 600 MG/1
600 TABLET ORAL EVERY 6 HOURS PRN
Qty: 24 TABLET | Refills: 0 | Status: SHIPPED | OUTPATIENT
Start: 2023-09-14 | End: 2023-09-25 | Stop reason: SDUPTHER

## 2023-09-14 RX ORDER — ACETAMINOPHEN 500 MG
1000 TABLET ORAL EVERY 8 HOURS
Qty: 60 TABLET | Refills: 0 | Status: SHIPPED | OUTPATIENT
Start: 2023-09-14

## 2023-09-14 NOTE — TELEPHONE ENCOUNTER
Spoke with the patient. Notified of 6 AM arrival time to the [facility].  Informed of current visitor policy.  Reminded of NPO and need for transportation. Patient verbalized understanding to all.    Lili Jefferson MS, OTC  Clinical Assistant to Dr. Ross Dunbar [facility]

## 2023-09-15 ENCOUNTER — HOSPITAL ENCOUNTER (OUTPATIENT)
Facility: HOSPITAL | Age: 48
Discharge: HOME | End: 2023-09-15
Attending: ORTHOPAEDIC SURGERY | Admitting: ORTHOPAEDIC SURGERY
Payer: COMMERCIAL

## 2023-09-15 ENCOUNTER — ANESTHESIA (OUTPATIENT)
Dept: SURGERY | Facility: HOSPITAL | Age: 48
End: 2023-09-15
Payer: COMMERCIAL

## 2023-09-15 VITALS
HEIGHT: 69 IN | TEMPERATURE: 98 F | SYSTOLIC BLOOD PRESSURE: 134 MMHG | DIASTOLIC BLOOD PRESSURE: 88 MMHG | HEART RATE: 68 BPM | BODY MASS INDEX: 27.25 KG/M2 | OXYGEN SATURATION: 95 % | WEIGHT: 184 LBS | RESPIRATION RATE: 16 BRPM

## 2023-09-15 DIAGNOSIS — G56.00 CTS (CARPAL TUNNEL SYNDROME): ICD-10-CM

## 2023-09-15 DIAGNOSIS — G56.21 CUBITAL TUNNEL SYNDROME, RIGHT: ICD-10-CM

## 2023-09-15 DIAGNOSIS — G56.01 CARPAL TUNNEL SYNDROME OF RIGHT WRIST: ICD-10-CM

## 2023-09-15 DIAGNOSIS — M62.81 MUSCLE WEAKNESS OF UPPER EXTREMITY: Primary | ICD-10-CM

## 2023-09-15 PROCEDURE — 27201423 OPTIME MED/SURG SUP & DEVICES STERILE SUPPLY: Performed by: ORTHOPAEDIC SURGERY

## 2023-09-15 PROCEDURE — 25000003 PHARM REV CODE 250: Performed by: NURSE ANESTHETIST, CERTIFIED REGISTERED

## 2023-09-15 PROCEDURE — D9220A PRA ANESTHESIA: ICD-10-PCS | Mod: CRNA,,, | Performed by: NURSE ANESTHETIST, CERTIFIED REGISTERED

## 2023-09-15 PROCEDURE — D9220A PRA ANESTHESIA: Mod: ANES,,, | Performed by: ANESTHESIOLOGY

## 2023-09-15 PROCEDURE — 64718 PR REVISE ULNAR NERVE AT ELBOW: ICD-10-PCS | Mod: RT,,, | Performed by: ORTHOPAEDIC SURGERY

## 2023-09-15 PROCEDURE — 25000003 PHARM REV CODE 250: Performed by: STUDENT IN AN ORGANIZED HEALTH CARE EDUCATION/TRAINING PROGRAM

## 2023-09-15 PROCEDURE — 36000706: Performed by: ORTHOPAEDIC SURGERY

## 2023-09-15 PROCEDURE — 64415 RIGHT SUPRACLAVICULAR SS BLOCK: ICD-10-PCS | Mod: 59,RT,, | Performed by: ANESTHESIOLOGY

## 2023-09-15 PROCEDURE — 25000003 PHARM REV CODE 250: Performed by: ORTHOPAEDIC SURGERY

## 2023-09-15 PROCEDURE — 29848 PR WRIST ARTHROSCOP,RELEASE XVERS LIG: ICD-10-PCS | Mod: 51,RT,, | Performed by: ORTHOPAEDIC SURGERY

## 2023-09-15 PROCEDURE — 37000008 HC ANESTHESIA 1ST 15 MINUTES: Performed by: ORTHOPAEDIC SURGERY

## 2023-09-15 PROCEDURE — 99900035 HC TECH TIME PER 15 MIN (STAT)

## 2023-09-15 PROCEDURE — 29848 WRIST ENDOSCOPY/SURGERY: CPT | Mod: 51,RT,, | Performed by: ORTHOPAEDIC SURGERY

## 2023-09-15 PROCEDURE — D9220A PRA ANESTHESIA: ICD-10-PCS | Mod: ANES,,, | Performed by: ANESTHESIOLOGY

## 2023-09-15 PROCEDURE — 36000707: Performed by: ORTHOPAEDIC SURGERY

## 2023-09-15 PROCEDURE — D9220A PRA ANESTHESIA: Mod: CRNA,,, | Performed by: NURSE ANESTHETIST, CERTIFIED REGISTERED

## 2023-09-15 PROCEDURE — 63600175 PHARM REV CODE 636 W HCPCS: Performed by: ORTHOPAEDIC SURGERY

## 2023-09-15 PROCEDURE — 63600175 PHARM REV CODE 636 W HCPCS: Performed by: ANESTHESIOLOGY

## 2023-09-15 PROCEDURE — 64415 NJX AA&/STRD BRCH PLXS IMG: CPT | Mod: 59,RT | Performed by: STUDENT IN AN ORGANIZED HEALTH CARE EDUCATION/TRAINING PROGRAM

## 2023-09-15 PROCEDURE — 63600175 PHARM REV CODE 636 W HCPCS: Performed by: STUDENT IN AN ORGANIZED HEALTH CARE EDUCATION/TRAINING PROGRAM

## 2023-09-15 PROCEDURE — 64718 REVISE ULNAR NERVE AT ELBOW: CPT | Mod: RT,,, | Performed by: ORTHOPAEDIC SURGERY

## 2023-09-15 PROCEDURE — 63600175 PHARM REV CODE 636 W HCPCS: Performed by: NURSE ANESTHETIST, CERTIFIED REGISTERED

## 2023-09-15 PROCEDURE — 64415 NJX AA&/STRD BRCH PLXS IMG: CPT | Mod: 59,RT,, | Performed by: ANESTHESIOLOGY

## 2023-09-15 PROCEDURE — 71000015 HC POSTOP RECOV 1ST HR: Performed by: ORTHOPAEDIC SURGERY

## 2023-09-15 PROCEDURE — 94761 N-INVAS EAR/PLS OXIMETRY MLT: CPT

## 2023-09-15 PROCEDURE — 37000009 HC ANESTHESIA EA ADD 15 MINS: Performed by: ORTHOPAEDIC SURGERY

## 2023-09-15 PROCEDURE — 71000033 HC RECOVERY, INTIAL HOUR: Performed by: ORTHOPAEDIC SURGERY

## 2023-09-15 RX ORDER — CELECOXIB 200 MG/1
400 CAPSULE ORAL ONCE
Status: COMPLETED | OUTPATIENT
Start: 2023-09-15 | End: 2023-09-15

## 2023-09-15 RX ORDER — PROPOFOL 10 MG/ML
VIAL (ML) INTRAVENOUS CONTINUOUS PRN
Status: DISCONTINUED | OUTPATIENT
Start: 2023-09-15 | End: 2023-09-15

## 2023-09-15 RX ORDER — ROPIVACAINE HYDROCHLORIDE 5 MG/ML
INJECTION, SOLUTION EPIDURAL; INFILTRATION; PERINEURAL
Status: COMPLETED | OUTPATIENT
Start: 2023-09-15 | End: 2023-09-15

## 2023-09-15 RX ORDER — MIDAZOLAM HYDROCHLORIDE 1 MG/ML
.5-4 INJECTION INTRAMUSCULAR; INTRAVENOUS
Status: DISCONTINUED | OUTPATIENT
Start: 2023-09-15 | End: 2023-09-15 | Stop reason: HOSPADM

## 2023-09-15 RX ORDER — HYDROCODONE BITARTRATE AND ACETAMINOPHEN 5; 325 MG/1; MG/1
1 TABLET ORAL EVERY 4 HOURS PRN
Status: DISCONTINUED | OUTPATIENT
Start: 2023-09-15 | End: 2023-09-15 | Stop reason: HOSPADM

## 2023-09-15 RX ORDER — FENTANYL CITRATE 50 UG/ML
25-200 INJECTION, SOLUTION INTRAMUSCULAR; INTRAVENOUS
Status: DISCONTINUED | OUTPATIENT
Start: 2023-09-15 | End: 2023-09-15 | Stop reason: HOSPADM

## 2023-09-15 RX ORDER — ONDANSETRON 2 MG/ML
INJECTION INTRAMUSCULAR; INTRAVENOUS
Status: DISCONTINUED | OUTPATIENT
Start: 2023-09-15 | End: 2023-09-15

## 2023-09-15 RX ORDER — DEXAMETHASONE SODIUM PHOSPHATE 4 MG/ML
INJECTION, SOLUTION INTRA-ARTICULAR; INTRALESIONAL; INTRAMUSCULAR; INTRAVENOUS; SOFT TISSUE
Status: DISCONTINUED | OUTPATIENT
Start: 2023-09-15 | End: 2023-09-15

## 2023-09-15 RX ORDER — FENTANYL CITRATE 50 UG/ML
INJECTION, SOLUTION INTRAMUSCULAR; INTRAVENOUS
Status: DISCONTINUED | OUTPATIENT
Start: 2023-09-15 | End: 2023-09-15

## 2023-09-15 RX ORDER — SODIUM CHLORIDE 0.9 % (FLUSH) 0.9 %
10 SYRINGE (ML) INJECTION
Status: DISCONTINUED | OUTPATIENT
Start: 2023-09-15 | End: 2023-09-15 | Stop reason: HOSPADM

## 2023-09-15 RX ORDER — MIDAZOLAM HYDROCHLORIDE 1 MG/ML
INJECTION, SOLUTION INTRAMUSCULAR; INTRAVENOUS
Status: DISCONTINUED | OUTPATIENT
Start: 2023-09-15 | End: 2023-09-15

## 2023-09-15 RX ORDER — MUPIROCIN 20 MG/G
OINTMENT TOPICAL
Status: DISCONTINUED | OUTPATIENT
Start: 2023-09-15 | End: 2023-09-15 | Stop reason: HOSPADM

## 2023-09-15 RX ORDER — ACETAMINOPHEN 500 MG
1000 TABLET ORAL ONCE
Status: COMPLETED | OUTPATIENT
Start: 2023-09-15 | End: 2023-09-15

## 2023-09-15 RX ORDER — FAMOTIDINE 10 MG/ML
INJECTION INTRAVENOUS
Status: DISCONTINUED | OUTPATIENT
Start: 2023-09-15 | End: 2023-09-15

## 2023-09-15 RX ORDER — LIDOCAINE HYDROCHLORIDE 20 MG/ML
INJECTION INTRAVENOUS
Status: DISCONTINUED | OUTPATIENT
Start: 2023-09-15 | End: 2023-09-15

## 2023-09-15 RX ORDER — CARBOXYMETHYLCELLULOSE SODIUM 10 MG/ML
GEL OPHTHALMIC
Status: DISCONTINUED | OUTPATIENT
Start: 2023-09-15 | End: 2023-09-15

## 2023-09-15 RX ADMIN — ONDANSETRON 4 MG: 2 INJECTION INTRAMUSCULAR; INTRAVENOUS at 07:09

## 2023-09-15 RX ADMIN — FENTANYL CITRATE 100 MCG: 50 INJECTION, SOLUTION INTRAMUSCULAR; INTRAVENOUS at 07:09

## 2023-09-15 RX ADMIN — FAMOTIDINE 20 MG: 10 INJECTION, SOLUTION INTRAVENOUS at 07:09

## 2023-09-15 RX ADMIN — CEFAZOLIN 2 G: 2 INJECTION, POWDER, FOR SOLUTION INTRAMUSCULAR; INTRAVENOUS at 07:09

## 2023-09-15 RX ADMIN — ROPIVACAINE HYDROCHLORIDE 30 ML: 5 INJECTION EPIDURAL; INFILTRATION; PERINEURAL at 07:09

## 2023-09-15 RX ADMIN — ACETAMINOPHEN 1000 MG: 500 TABLET ORAL at 06:09

## 2023-09-15 RX ADMIN — LIDOCAINE HYDROCHLORIDE 100 MG: 20 INJECTION INTRAVENOUS at 07:09

## 2023-09-15 RX ADMIN — CARBOXYMETHYLCELLULOSE SODIUM 2 DROP: 10 GEL OPHTHALMIC at 08:09

## 2023-09-15 RX ADMIN — CELECOXIB 400 MG: 200 CAPSULE ORAL at 06:09

## 2023-09-15 RX ADMIN — SODIUM CHLORIDE: 9 INJECTION, SOLUTION INTRAVENOUS at 07:09

## 2023-09-15 RX ADMIN — PROPOFOL 150 MCG/KG/MIN: 10 INJECTION, EMULSION INTRAVENOUS at 07:09

## 2023-09-15 RX ADMIN — MUPIROCIN: 20 OINTMENT TOPICAL at 06:09

## 2023-09-15 RX ADMIN — DEXAMETHASONE SODIUM PHOSPHATE 4 MG: 4 INJECTION, SOLUTION INTRAMUSCULAR; INTRAVENOUS at 07:09

## 2023-09-15 RX ADMIN — MIDAZOLAM 2 MG: 1 INJECTION INTRAMUSCULAR; INTRAVENOUS at 07:09

## 2023-09-15 RX ADMIN — MIDAZOLAM HYDROCHLORIDE 2 MG: 1 INJECTION, SOLUTION INTRAMUSCULAR; INTRAVENOUS at 07:09
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
## 2023-09-15 NOTE — ANESTHESIA PROCEDURE NOTES
Right Supraclavicular SS Block    Patient location during procedure: pre-op   Block not for primary anesthetic.  Reason for block: at surgeon's request and post-op pain management   Post-op Pain Location: Right Arm pain   Start time: 9/15/2023 7:20 AM  Timeout: 9/15/2023 7:20 AM   End time: 9/15/2023 7:25 AM    Staffing  Authorizing Provider: Amrit Keys MD  Performing Provider: Javier Werner MD    Staffing  Performed by: Javier Werner MD  Authorized by: Amrit Keys MD    Preanesthetic Checklist  Completed: patient identified, IV checked, site marked, risks and benefits discussed, surgical consent, monitors and equipment checked, pre-op evaluation and timeout performed  Peripheral Block  Patient position: supine  Prep: ChloraPrep  Patient monitoring: heart rate, cardiac monitor, continuous pulse ox, continuous capnometry and frequent blood pressure checks  Block type: supraclavicular  Laterality: right  Injection technique: single shot  Needle  Needle type: Echogenic   Needle gauge: 22 G  Needle length: 2 in  Needle localization: anatomical landmarks and ultrasound guidance   -ultrasound image captured on disc.  Assessment  Injection assessment: negative aspiration, negative parasthesia and local visualized surrounding nerve  Paresthesia pain: none  Heart rate change: no  Slow fractionated injection: yes  Pain Tolerance: comfortable throughout block and no complaints  Medications:    Medications: ropivacaine (NAROPIN) injection 0.5% - Perineural   30 mL - 9/15/2023 7:24:00 AM    Additional Notes  VSS.  [facility] RN monitoring vitals throughout procedure.  Patient tolerated procedure well.

## 2023-09-15 NOTE — BRIEF OP NOTE
"[place] - Surgery (Hospital)  Brief Operative Note    Surgery Date: 9/15/2023     Surgeon(s) and Role:     * Johnny Maguire MD - Primary    Assisting Surgeon: None    Pre-op Diagnosis:  Carpal tunnel syndrome of right wrist [G56.01]  Cubital tunnel syndrome, right [G56.21]    Post-op Diagnosis:  Post-Op Diagnosis Codes:     * Carpal tunnel syndrome of right wrist [G56.01]     * Cubital tunnel syndrome, right [G56.21]    Procedure(s) (LRB):  DECOMPRESSION, NERVE, ULNAR - right CuTR and ECTR (Right)  RELEASE, CARPAL TUNNEL, ENDOSCOPIC (Right)    Anesthesia: Regional    Operative Findings: See full op note    Estimated Blood Loss: minimal         Specimens:   Specimen (24h ago, onward)      None              Discharge Note    OUTCOME: Patient tolerated treatment/procedure well without complication and is now ready for discharge.    DISPOSITION: Home or Self Care    FINAL DIAGNOSIS:  <principal problem not specified>    FOLLOWUP: In clinic    DISCHARGE INSTRUCTIONS:    Discharge Procedure Orders   Diet general     Call MD for:  temperature >100.4     Call MD for:  persistent nausea and vomiting     Call MD for:  severe uncontrolled pain     Call MD for:  difficulty breathing, headache or visual disturbances     Call MD for:  redness, tenderness, or signs of infection (pain, swelling, redness, odor or green/yellow discharge around incision site)     Call MD for:  hives     Call MD for:  persistent dizziness or light-headedness     Call MD for:  extreme fatigue     Other restrictions (specify):   Order Comments: You will be discharged on a "multi-modal" pain regimen. This means that different medications are used to control your pain. Each medication works differently to control your pain so that your pain control is optimized.    You have been prescribed Ultram (Tramadol). You have been prescribed 20 pills. You may also take Motrin (Ibuprofen) and Tyelnol (Acetaminophen).    Norco and Percocet contain tylenol, do not " take tylenol with these medications if you were prescribed them.     Do not bear weight on the operative extremity.    Leave your dressing on until your follow up appointment.     Call us if you experience: fever, chills, chest pain, shortness of breath, severe headache, pain not relieved by oral medications, increased pain and swelling at the operative site, or any other questions or concerns. We are happy to assist you at any time.     Leave dressing on - Keep it clean, dry, and intact until clinic visit

## 2023-09-15 NOTE — TRANSFER OF CARE
"Anesthesia Transfer of Care Note    Patient: James Royden Peabody IV    Procedure(s) Performed: Procedure(s) (LRB):  DECOMPRESSION, NERVE, ULNAR - right CuTR and ECTR (Right)  RELEASE, CARPAL TUNNEL, ENDOSCOPIC (Right)    Patient location: PACU    Anesthesia Type: general    Transport from OR: Transported from OR on room air with adequate spontaneous ventilation    Post pain: adequate analgesia    Post assessment: no apparent anesthetic complications    Post vital signs: stable    Level of consciousness: awake    Nausea/Vomiting: no nausea/vomiting    Complications: none    Transfer of care protocol was followed      Last vitals:   Visit Vitals  /81 (BP Location: Left arm, Patient Position: Lying)   Pulse (!) 51   Temp 36.7 °C (98.1 °F) (Temporal)   Resp (!) 22   Ht 5' 9" (1.753 m)   Wt 83.5 kg (184 lb)   SpO2 95%   BMI 27.17 kg/m²     "

## 2023-09-15 NOTE — ANESTHESIA POSTPROCEDURE EVALUATION
Anesthesia Post Evaluation    Patient: James Royden Peabody IV    Procedure(s) Performed: Procedure(s) (LRB):  DECOMPRESSION, NERVE, ULNAR - right CuTR and ECTR (Right)  RELEASE, CARPAL TUNNEL, ENDOSCOPIC (Right)    Final Anesthesia Type: general      Patient location during evaluation: PACU  Patient participation: Yes- Able to Participate  Level of consciousness: awake and alert and oriented  Post-procedure vital signs: reviewed and stable  Pain management: adequate  Airway patency: patent    PONV status at discharge: No PONV  Anesthetic complications: no      Cardiovascular status: blood pressure returned to baseline and hemodynamically stable  Respiratory status: unassisted, room air and spontaneous ventilation  Hydration status: euvolemic  Follow-up not needed.          Vitals Value Taken Time   /88 09/15/23 0950   Temp 36.7 °C (98.1 °F) 09/15/23 0915   Pulse 59 09/15/23 0959   Resp 33 09/15/23 0958   SpO2 92 % 09/15/23 0959   Vitals shown include unvalidated device data.      Event Time   Out of Recovery 09:22:00         Pain/Naz Score: Pain Rating Prior to Med Admin: 3 (9/15/2023  7:12 AM)  Naz Score: 10 (9/15/2023  9:23 AM)

## 2023-09-15 NOTE — PLAN OF CARE
Discharge plan reviewed w/ pt and spouse at bedside. AVSS on RA. R arm dressing CDI. All Rx delivered to bedside. Pt states he is ready for discharge.

## 2023-09-15 NOTE — PLAN OF CARE
Need site marked, updated h&p and anesthesia consent.    Bed in lowest, locked position with side rails up X2. Call light within reach. Wife at bedside. Belongings to be locked up. No apparent distress noted. Instructed to call if he needs anything. No apparent distress noted. Will continue to monitor.

## 2023-09-15 NOTE — OP NOTE
ORTHOPEDIC SURGERY OPERATIVE NOTE     SERVICE: ORTHOPEDICS      DATE OF PROCEDURE: 09/15/2023     SURGEON: Johnny Maguire M.D.     ASSISTANT: MD Dana     PREOPERATIVE DIAGNOSIS:  1) right sided carpal tunnel syndrome  2) right cubital tunnel syndrome     POSTOPERATIVE DIAGNOSIS:  1) right sided carpal tunnel syndrome  2) right cubital tunnel syndrome     PROCEDURE PERFORMED:   1) Endoscopic right sided carpal tunnel release, CPT code 51060  2) right ulnar nerve decompression at the elbow/cubital tunnel release     ANESTHESIA: Regional/MAC     ESTIMATED BLOOD LOSS: Minimal           SPECIMENS: None     COMPLICATIONS: None              CONDITION: Stable     IV FLUIDS: Crystalloid per anesthesia     IMPLANTS: None     TOURNIQUET TIME: 34 minutes at 250 mmHg     INDICATIONS FOR PROCEDURE:  The patient is a 49yo male who presented to clinic with findings and workup consistent with carpal tunnel syndrome of the right hand and cubital tunnel syndrome of the right upper extremity.  The patient had not responded to nonoperative management and wished to proceed with surgical intervention.  The risks, benefits and alternatives to surgery were discussed with the patient at great length and in great detail.  Specific risks discussed include but are not limited to bleeding, infection, vessel damage, nerve damage, pain, numbness, tingling, weakness, stiffness, complex regional pain syndrome, hardware/surgical failure, need for further surgery, DVT, PE, arthritis, scar sensitivity, delayed healing, need for prolonged postoperative rehabilitation, and death amongst others.  The patient stated an understanding of the risks and wished to proceed with surgery.   All questions were answered.  No guarantees were implied or stated.  Informed consent was obtained.     PROCEDURE IN DETAIL: With the patient's participation in the preoperative holding area, the operative upper extremity was marked at the surgical sites. Preoperative checks  were completed and consents were verified.  Regional anesthesia was administered.  The patient was brought to the Operating Room and placed in supine position.  A well-padded nonsterile tourniquet was placed on the upper arm.  The right arm was then prepped and draped in the usual sterile fashion.  Timeout was called for to verify the correct site, procedure and patient.  All were in agreement and we proceeded.  MAC anesthesia was introduced.       Attention was 1st turned towards the right sided endoscopic carpal tunnel release.  Esmarch was utilized to exsanguinate the operative upper extremity and tourniquet was inflated to 250mmHg.  Next, a small 1 cm transverse incision was made in line with a proximal wrist flexion crease beginning radially at the palmaris longus and extending ulnarly for 1 cm.  Dissection was continued to level of antebrachial fascia.  This was transversely incised in line with the skin incision.  A narrow double skin hook was then utilized to elevate the distal most aspect of the incision to gain access to the carpal tunnel.  A series of small and large dilators were utilized to dilate our planned path with the endoscope.  A synovial elevator was then utilized to free synovium from the undersurface of the transverse carpal ligament.  Washboard effect was confirmed.  At this time, the Arthrex endoscopic carpal tunnel system was introduced into the incision.  Confirmation of placement within the carpal tunnel was confirmed.  The endoscope was advanced the distal aspect of the transverse carpal ligament and the distal fat was visualized.  The median nerve was identified radial to the endoscope and was protected throughout the duration of the procedure.  Excellent visualization of the transverse carpal ligament along its entire length was confirmed with no interposed soft tissue.  I then began my division of the transverse carpal ligament from distally and extending in a proximal direction.  The  knife was deployed and complete full-thickness transection of the transverse carpal ligament was performed beginning distally and working my way more proximally.  Complete division of the ligament was performed. Care was taken distally to ensure not to cut past [place] Cardinal Line or injure the superficial palmar arch. The distal fat was visualized at the distalmost aspect of the ligament division. Tension and pressure within the carpal tunnel was dramatically improved and decreased status post ligament division.  The endoscope was then once again advanced more distally and completion of the division was confirmed.  Fat protruded through the divided ligament throughout.  The median nerve was identified along the length of our ligament division and remained intact and uninjured.  The endoscope was then removed from the wound and the antebrachial forearm fascia was divided in line with the ligament division by hand with tenotomy scissors.  The wound was then irrigated with copious amounts of sterile saline.  Skin was closed with 4 0 nylon suture.        Attention was next turned towards the cubital tunnel release.     A 5 cm Skin incision just posterior to the medial epicondyle of the right elbow  was then made sharply with a scalpel and dissection was carried down   Through the skin and subcutaneous tissues.  Several branches of the medial antebrachial   cutaneous nerve were identified and protected throughout the duration of the   procedure.  Dissection was carried down more deeply just posterior to the medial   epicondyle and a gentle dissection was carried down to identify the ulnar nerve   in the proximal portion of our incision.  Dissection was then continued   proximally and the ulnar nerve was completely decompressed all the way up the   medial intermuscular septum well proximal to the arcade of [place].  Finger dissection along the course of the nerve revealed   that there was no additional constriction  at this time and it was free and well   decompressed proximally.  I then continued my decompression of the ulnar nerve   more distally in the wound.  The nerve was decompressed in a proximal to distal   direction at this time, and motor branches to the flexor carpi ulnaris were   identified and protected throughout the dissection.  The nerve was very tightly compressed particularly at Yen ligament.  Upon releasing Yen ligament the nerve assumed an hourglass configuration.  It was very bulbous at this site after decompression and appeared to have been severely and chronically compressed.  The FCU fascia was also   released well distal to the mid aspect of the patient's forearm.  After   this, finger dissection was utilized to ensure complete release with no evidence   of any further constriction and I was very pleased with my decompression at   this time.  After I had ensured complete decompression with proximally and   distally, I then placed the operative elbow through a full range of motion with   flexion and extension and the nerve was stable and did not sublux with full   range of motion of the elbow.  At this time, I then irrigated the wound   copiously with sterile saline.  Tourniquet was then deflated and brisk capillary   refill ensued throughout the patient's operative upper extremity.  There was no   significant bleeding and hemostasis was achieved with bipolar electrocautery.    The subcutaneous tissues at the elbow incision were closed with 4-0 Vicryl   fascia in an inverted interrupted fashion and then the skin was closed with 4-0   Nylon.       Sterile dressings were applied consisting of Xeroform 4 x 4 gauze cast padding and a long-arm splint to the right upper extremity.  Tourniquet was deflated and brisk cap refill ensued.  There was no significant bleeding.  The patient was awakened from anesthesia return the post anesthesia care in stable condition.  There were no complications as  attending surgeon was present performed the entire procedure.      DISPOSITION: The patient will be discharged home with followup in approximately 2 weeks for suture removal.  Early active range of motion in the acute postoperative period was discussed and encouraged.  They are to keep the dressing clean, dry, and intact until that time.

## 2023-09-15 NOTE — ANESTHESIA PREPROCEDURE EVALUATION
09/15/2023  Pre-operative evaluation for Procedure(s) (LRB):  DECOMPRESSION, NERVE, ULNAR - right CuTR and ECTR (Right)  RELEASE, CARPAL TUNNEL, ENDOSCOPIC (Right)    James Royden Peabody IV is a 48 y.o. male     Patient Active Problem List   Diagnosis    Hyperlipidemia    Smoker    Dysphagia    Hiatal hernia    GERD (gastroesophageal reflux disease)    History of esophageal stricture (benign s/p dilation EGD in 2019(    Colon polyps    Cervical radiculopathy    Muscle weakness of upper extremity    Carpal tunnel syndrome    Annual physical exam    Healthcare maintenance    Tobacco use disorder    H/O alcohol abuse    Chronic right shoulder pain    Bursitis of right shoulder    Instability of right shoulder joint       Review of patient's allergies indicates:  No Known Allergies    No current facility-administered medications on file prior to encounter.     Current Outpatient Medications on File Prior to Encounter   Medication Sig Dispense Refill    cyclobenzaprine (FLEXERIL) 10 MG tablet Take 1 tablet (10 mg total) by mouth 3 (three) times daily as needed for Muscle spasms. 60 tablet 1    gabapentin (NEURONTIN) 300 MG capsule Take 1 capsule (300 mg total) by mouth 3 (three) times daily. 60 capsule 1    meloxicam (MOBIC) 15 MG tablet Take 1 tablet (15 mg total) by mouth once daily. 60 tablet 1    omeprazole (PRILOSEC) 40 MG capsule Take 1 capsule (40 mg total) by mouth once daily. 90 capsule 3    simvastatin (ZOCOR) 40 MG tablet Take 1 tablet (40 mg total) by mouth every evening. 30 tablet 11    valACYclovir (VALTREX) 1000 MG tablet Take 1 tablet (1,000 mg total) by mouth every 12 (twelve) hours. for 7 days 14 tablet 0       Past Surgical History:   Procedure Laterality Date    COLONOSCOPY N/A 1/6/2022    Procedure: COLONOSCOPY;  Surgeon: Андрей Obrien MD;  Location[facility];  Service:  Endoscopy;  Laterality: N/A;    EPIDURAL STEROID INJECTION N/A 2/3/2023    Procedure: INJECTION, STEROID, EPIDURAL, C7/T1 CONTRAST DIRECT REF;  Surgeon: Cuauhtemoc Lyman MD;  Location: [facility]IN [facility]GT;  Service: Pain Management;  Laterality: N/A;    ESOPHAGOGASTRODUODENOSCOPY N/A 10/8/2019    Procedure: EGD (ESOPHAGOGASTRODUODENOSCOPY);  Surgeon: Marito Pringle MD;  Location: [facility] ENDO (4TH FLR);  Service: Endoscopy;  Laterality: N/A;    ESOPHAGOGASTRODUODENOSCOPY N/A 1/6/2022    Procedure: EGD (ESOPHAGOGASTRODUODENOSCOPY);  Surgeon: Андрей Obrien MD;  Location: [facility] ENDO;  Service: Endoscopy;  Laterality: N/A;  fully vaccinated  instructions portal -ml  Rapid covid    HERNIA REPAIR         Social History     Socioeconomic History    Marital status:    Tobacco Use    Smoking status: Every Day     Current packs/day: 1.00     Average packs/day: 1 pack/day for 34.7 years (34.7 ttl pk-yrs)     Types: Cigarettes     Start date: 1989    Smokeless tobacco: Never   Substance and Sexual Activity    Alcohol use: Yes     Alcohol/week: 7.0 standard drinks of alcohol     Types: 7 Cans of beer per week    Drug use: Yes   Social History Narrative     and repair. M x 9, 3 kids       EKG:  Sinus bradycardia   Otherwise normal ECG   When compared with ECG of 07-JUL-2008 09:16,   No significant change was found   Confirmed by ALEXANDRO ESCAMILLA MD ([id number]) on 7/13/2023 11:23:17 AM     2D Echo:  No results found for this or any previous visit.        Pre-op Assessment    I have reviewed the Patient Summary Reports.     I have reviewed the Nursing Notes. I have reviewed the NPO Status.   I have reviewed the Medications.     Review of Systems  Anesthesia Hx:  Denies Family Hx of Anesthesia complications.   Denies Personal Hx of Anesthesia complications.   Cardiovascular:   Exercise tolerance: good Denies Dysrhythmias.   Denies Angina.  Denies DASH.    Pulmonary:   Denies COPD.  Denies Asthma.    Renal/:   Denies Chronic  Renal Disease.     Hepatic/GI:   Hiatal Hernia, GERD    Musculoskeletal:   Arthritis     Neurological:   Denies CVA. Denies Seizures.    Endocrine:   Denies Diabetes.        Physical Exam  General: Well nourished, Cooperative, Alert and Oriented    Airway:  Mallampati: II / I  Mouth Opening: Normal  TM Distance: Normal  Tongue: Normal  Neck ROM: Normal ROM    Dental:  Intact    Chest/Lungs:  Clear to auscultation, Normal Respiratory Rate    Heart:  Rate: Normal  Rhythm: Regular Rhythm        Anesthesia Plan  Type of Anesthesia, risks & benefits discussed:    Anesthesia Type: Gen Natural Airway, Regional  Intra-op Monitoring Plan: Standard ASA Monitors  Post Op Pain Control Plan: multimodal analgesia and peripheral nerve block  Induction:  IV  Informed Consent: Informed consent signed with the Patient and all parties understand the risks and agree with anesthesia plan.  All questions answered. Patient consented to blood products? Yes  ASA Score: 2  Day of Surgery Review of History & Physical: H&P Update referred to the surgeon/provider.    Ready For Surgery From Anesthesia Perspective.     .

## 2023-09-25 ENCOUNTER — OFFICE VISIT (OUTPATIENT)
Dept: ORTHOPEDICS | Facility: CLINIC | Age: 48
End: 2023-09-25
Payer: COMMERCIAL

## 2023-09-25 DIAGNOSIS — G56.21 CUBITAL TUNNEL SYNDROME, RIGHT: ICD-10-CM

## 2023-09-25 DIAGNOSIS — Z98.890 POSTOPERATIVE STATE: ICD-10-CM

## 2023-09-25 DIAGNOSIS — G56.01 CARPAL TUNNEL SYNDROME OF RIGHT WRIST: Primary | ICD-10-CM

## 2023-09-25 PROCEDURE — 3044F HG A1C LEVEL LT 7.0%: CPT | Mod: CPTII,S$GLB,, | Performed by: PHYSICIAN ASSISTANT

## 2023-09-25 PROCEDURE — 99999 PR PBB SHADOW E&M-EST. PATIENT-LVL III: CPT | Mod: PBBFAC,,, | Performed by: PHYSICIAN ASSISTANT

## 2023-09-25 PROCEDURE — 3044F PR MOST RECENT HEMOGLOBIN A1C LEVEL <7.0%: ICD-10-PCS | Mod: CPTII,S$GLB,, | Performed by: PHYSICIAN ASSISTANT

## 2023-09-25 PROCEDURE — 99999 PR PBB SHADOW E&M-EST. PATIENT-LVL III: ICD-10-PCS | Mod: PBBFAC,,, | Performed by: PHYSICIAN ASSISTANT

## 2023-09-25 PROCEDURE — 1159F PR MEDICATION LIST DOCUMENTED IN MEDICAL RECORD: ICD-10-PCS | Mod: CPTII,S$GLB,, | Performed by: PHYSICIAN ASSISTANT

## 2023-09-25 PROCEDURE — 1159F MED LIST DOCD IN RCRD: CPT | Mod: CPTII,S$GLB,, | Performed by: PHYSICIAN ASSISTANT

## 2023-09-25 PROCEDURE — 99024 POSTOP FOLLOW-UP VISIT: CPT | Mod: S$GLB,,, | Performed by: PHYSICIAN ASSISTANT

## 2023-09-25 PROCEDURE — 99024 PR POST-OP FOLLOW-UP VISIT: ICD-10-PCS | Mod: S$GLB,,, | Performed by: PHYSICIAN ASSISTANT

## 2023-09-25 RX ORDER — TRAMADOL HYDROCHLORIDE 50 MG/1
50 TABLET ORAL EVERY 6 HOURS
Qty: 10 TABLET | Refills: 0 | Status: SHIPPED | OUTPATIENT
Start: 2023-09-25

## 2023-09-25 RX ORDER — IBUPROFEN 600 MG/1
600 TABLET ORAL EVERY 6 HOURS PRN
Qty: 24 TABLET | Refills: 0 | Status: SHIPPED | OUTPATIENT
Start: 2023-09-25

## 2023-09-25 NOTE — PROGRESS NOTES
Dr. Maguire is the supervising physician for this encounter/patient    James Royden Peabody IV presents for post-operative evaluation.  The patient is now 2 weeks s/p Right endoscopic carpal tunnel release and cubital tunnel release at the elbow with Dr. Maguire on 9/15/23.  Overall the patient reports doing well.  He reports 5/10 pain, denies any f/c/s, reports improvement in his numbness.    PE:    AA&O x 4.  NAD  HEENT:  NCAT, sclera nonicteric  Lungs:  Respirations are equal and unlabored.  CV:  2+ bilateral upper and lower extremity pulses.  MSK: The wounds are healing well with no signs of erythema or warmth.  There is no drainage.  No clinical signs or symptoms of infection are present.  Near full elbow motion with discomfort, full wrist and hand motion. SILT. DNVI.    A/P: Status post above, doing well  1) Continue with motion as tolerated.  2) All sutures removed today. Wound care and signs of infection discussed. Scar massage discussed.  3) F/U 4 weeks  4) Call with any questions/concerns in the interim      Jamie Russo-DiGeorge PA-C

## 2023-09-27 DIAGNOSIS — B00.9 HSV INFECTION: ICD-10-CM

## 2023-09-27 RX ORDER — VALACYCLOVIR HYDROCHLORIDE 1 G/1
1000 TABLET, FILM COATED ORAL EVERY 12 HOURS
Qty: 14 TABLET | Refills: 0 | Status: SHIPPED | OUTPATIENT
Start: 2023-09-27 | End: 2023-10-04

## 2023-09-27 NOTE — TELEPHONE ENCOUNTER
Refill Routing Note   Medication(s) are not appropriate for processing by [facility] for the following reason(s):      Non-participating provider    [facility] action(s):  Route Care Due:  None identified            Appointments  past 12m or future 3m with PCP    Date Provider   Last Visit   11/8/2021 Moses Lopez MD   Next Visit   Visit date not found Moses Lopez MD   ED visits in past 90 days: 0        Note composed:9:01 AM 09/27/2023

## 2023-10-05 ENCOUNTER — CLINICAL SUPPORT (OUTPATIENT)
Dept: REHABILITATION | Facility: HOSPITAL | Age: 48
End: 2023-10-05
Attending: ORTHOPAEDIC SURGERY
Payer: COMMERCIAL

## 2023-10-05 DIAGNOSIS — G56.01 CARPAL TUNNEL SYNDROME OF RIGHT WRIST: ICD-10-CM

## 2023-10-05 DIAGNOSIS — M79.642 LEFT HAND PAIN: ICD-10-CM

## 2023-10-05 DIAGNOSIS — G56.21 CUBITAL TUNNEL SYNDROME, RIGHT: ICD-10-CM

## 2023-10-05 DIAGNOSIS — R29.898 LEFT ARM WEAKNESS: ICD-10-CM

## 2023-10-05 PROCEDURE — 97165 OT EVAL LOW COMPLEX 30 MIN: CPT | Mod: PO

## 2023-10-05 PROCEDURE — 97530 THERAPEUTIC ACTIVITIES: CPT | Mod: PO

## 2023-10-05 NOTE — PATIENT INSTRUCTIONS
"[facility], OCCUPATIONAL THERAPY  HOME EXERCISE PROGRAM       Massage to wrist and elbow 5 mins each.  2-3 x/day                                                            Complete the following exercises for 10 repetitions, 2-3 x/day:       AROM: Elbow Flexion / Extension        Bend and straighten elbow in 3 different positions:   thumb up, palm up, palm down.          AROM: Wrist Flexion / Extension               Bend your wrist forward and back as far as possible.          AROM: Wrist Radial / Ulnar Deviation  Bend your wrist from side to side as far as possible.      AROM: Isolated MCP Flexion / Extension ("Wave")   Bend only your large, bottom knuckles. Hold 3 seconds.   Keep the tips of your fingers straight. Straighten fingers.      AROM: Isolated IPJ Flexion / Extension ("Hook")   Bend only your middle and end knuckles. Hold 3 seconds.   Straighten your fingers.       AROM: MCP and PIP Flexion / Extension ("Straight Fist")  Bend your bottom and middle knuckles, keeping the tips of your fingers straight.   Try to touch the pads of your fingers on your palm. Hold 3 seconds.   Straighten your fingers.       AROM: Composite Flexion / Extension ("Full Fist")  Bend every joint in your hand into a fist. Hold 3 seconds.   Straighten your fingers.             1 min each, 1x/day:      Resisted    Squeeze putty using thumb and all fingers.            Resisted Lateral/Key Pinch  Squeeze between thumb and side of index finger.      Resisted 3-Jaw and 2pt Pinch   Pinch putty between tip of thumb and tip of index/long fingers. Pinch putty between tip of thumb and tip of index.           Therapist: ROSARIO Brito/BRITTNEY        "
right

## 2023-10-05 NOTE — PLAN OF CARE
[facility] Occupational Therapy  Initial Evaluation     Date: 10/5/2023  Patient: James Royden Peabody IV  Chart Number: [id number]  Referring Physician: Johnny Maguire MD  Therapy Diagnosis:   1. Carpal tunnel syndrome of right wrist  Ambulatory referral/consult to Physical/Occupational Therapy      2. Cubital tunnel syndrome, right  Ambulatory referral/consult to Physical/Occupational Therapy      3. Left arm weakness        4. Left hand pain            Physician Orders: Eval and treat   Medical Diagnosis:   G56.01 (ICD-10-CM) - Carpal tunnel syndrome of right wrist   G56.21 (ICD-10-CM) - Cubital tunnel syndrome, right     Evaluation Date: 10/5/2023  Plan of Care Certification Date: 11/17/23  Authorization Period: 10/5/2023 - 11/5/2023  Surgery Date and Procedure:   9/15/23   DECOMPRESSION, NERVE, ULNAR - right CuTR and ECTR (Right)  RELEASE, CARPAL TUNNEL, ENDOSCOPIC (Right)  Date of Return to MD: 10/23/23  FOTO Completion:     Visit #: 1 of 1   Time In: 8:00 am  Time Out: 8:45 am     Total Billable Time: 45 mins     Precautions: Standard    Subjective     History of Current Condition: Pt reports hx of right hand numbness and paresthesias.He underwent a carpal tunnel and cubital tunnel release by Dr. Maguire on 9/15/23. Follow up visit at MD office on 9/25/25 for post-op dressing removal and suture removal. Pt presents to OT today for initial evaluation with main c/o of weakness and sensitive elbow scars.     Falls: no     Involved Side: right   Dominant Side: right   Date of Onset: DOS 9/15/23  Imaging: x-ray  Previous Therapy: no     Patient's Goals for Therapy: ride his motorcycle, resume full duty work pain free     Pain:  Functional Pain Scale Rating 0-10:   2/10 on average  0/10 at best  8/10 at worst  4/10 current   Location: medial elbow  Description: tight  Aggravating Factors: bumping it   Easing Factors: advil       Occupation:  build equipment for [employer]  Working presently: employed  (self-employed)  Duties: painting, sanding, grinding, drilling       Functional Limitations/Social History:    Prior Level of Function: I  Current Level of Function:I     Home/Living environment: lives with their family  DME: none   Leisure: ride his motorcycle, go to concerts   Driving: Y      Past Medical History/Physical Systems Review:   James Royden Peabody IV  has a past medical history of Carpal tunnel syndrome, Cervical spine degeneration, Cubital tunnel syndrome, right, GERD (gastroesophageal reflux disease), H/O alcohol abuse, Hyperlipidemia, and Tobacco use disorder.    James Royden Peabody IV  has a past surgical history that includes Hernia repair; Esophagogastroduodenoscopy (N/A, 10/8/2019); Esophagogastroduodenoscopy (N/A, 1/6/2022); Colonoscopy (N/A, 1/6/2022); Epidural steroid injection (N/A, 2/3/2023); decompression, nerve, ulnar (Right, 9/15/2023); and Endoscopic carpal tunnel release (Right, 9/15/2023).    Marito has a current medication list which includes the following prescription(s): acetaminophen, cyclobenzaprine, gabapentin, ibuprofen, meloxicam, omeprazole, simvastatin, tramadol, and valacyclovir.    Review of patient's allergies indicates:  No Known Allergies       Objective     Mental status: alert, oriented x3    Observation:   Fully healed wrist and elbow incision    Sensation:   Volar fingertips    10/5/2023 10/5/2023    Left Right   Monofilament Testing     Normal 1.65-2.83     Diminished Light Touch 3.22-3.61 nt 2.83 digits 2-5   3.61  Thumb    Diminished Protective 3.84-4.31     Loss of Protective 4.56-6.65     Untestable >6.65         Edema: Circumferential measurements: In centimters     Left  10/5/2023 Right   10/5/2023   Proximal Wrist Crease 17.5 cm 18 cm      Left   10/5/2023 Right   10/5/2023   Elbow crease 26.5 cm 27.7 cm       Range of Motion:     Elbow and Wrist ROM. Measured in degrees.   10/5/2023 10/5/2023    Left  Right    Elbow Ext/Flex 0 / 140 0 / 150    Supination/Pronation nt nt   Wrist Ext/Flex 55 / 75 65 / 75   Wrist RD/UD 15 / 25 20 / 35       Hand ROM. Measured in degrees.  He demonstrates bilateral composite fists.      Strength: (BRISA Dynamometer in psi.)      10/5/2023 10/5/2023    Left Right   Rung II 87 45       Limitation/Restriction for FOTO Survey    Therapist reviewed FOTO scores for James Royden Peabody IV on 10/5/2023.   FOTO documents entered into [facility] - see Media section.    Intake Score: 44         Treatment     Treatment Time In: 8:25  Treatment Time Out: 8:45 am   Total Treatment time separate from Evaluation time: 20    Tracie received the following supervised modalities after being cleared for contraindications for 10 minutes:   - MH pack to right hand     Tracie participated in dynamic functional therapeutic activities to improve functional performance for 10  minutes, including:  - Introduced home exercise program and checked for correct performance  - Introduced and instructed on scar massage - checked for correct performance  - Discussed principles of edema management - compression, elevation, active range of motion   - Discussed principles of desensitization - use different fabrics/textures on hypersensitive scars   - Review precautions - no heavy  or pinch   - Use of hot and cold modalities         Patient Education and Home Exercises     Education provided:   - Role of OT, HEP, and POC    Written Home Exercises Provided: yes.  Exercises were reviewed and Tracie was able to demonstrate them prior to the end of the session.  Tracie demonstrated good  understanding of the education provided. See EMR under Patient Instructions for exercises provided during therapy sessions.     Pt was advised to perform these exercises free of pain, and to stop performing them if pain occurs.    Patient/Family Education: role of OT, goals for OT, scheduling/cancellations - pt verbalized understanding. Discussed insurance limitations with  patient.      Assessment     James Royden Peabody IV is a 48 y.o. male referred to outpatient occupational therapy and presents with the following therapy deficits: Decreased  strength, Decreased functional hand use, and Joint Stiffness and demonstrates limitations as described in the chart below. Following medical record review it is determined that pt will benefit from occupational therapy services in order to maximize pain free and/or functional use of right UE. The following goals were discussed with the patient and patient is in agreement with them as to be addressed in the treatment plan. The patient's rehab potential is Good.     Anticipated barriers to occupational therapy: none   Pt has no cultural, educational or language barriers to learning provided.    Profile and History Assessment of Occupational Performance Level of Clinical Decision Making Complexity Score   Occupational Profile:   James Royden Peabody IV is a 48 y.o. male who is currently employed James Royden Peabody IV has difficulty with  ADLs and IADLs as listed previously, which  affecting his/her daily functional abilities.      Comorbidities:    has a past medical history of Carpal tunnel syndrome, Cervical spine degeneration, Cubital tunnel syndrome, right, GERD (gastroesophageal reflux disease), H/O alcohol abuse, Hyperlipidemia, and Tobacco use disorder.    Medical and Therapy History Review:   Expanded               Performance Deficits    Physical:  Edema  Pain    Cognitive:  No Deficits    Psychosocial:    Habits  Routines  Rituals     Clinical Decision Making:  low    Assessment Process:  Problem-Focused Assessments    Modification/Need for Assistance:  Not Necessary    Intervention Selection:  Several Treatment Options       low  Based on PMHX, co morbidities , data from assessments and functional level of assistance required with task and clinical presentation directly impacting function.         Goals:    LTG's (6 weeks):  1)    Demonstrate 75-80 psi's of right  strength to grasp heavier objects at work   2)   Decrease complaints of pain to  1 out of 10 at worst to increase functional hand use for ADL/work/leisure activities.  3)   Patient to increase FOTO score by 10 points to demonstrate improved perception of functional right upper extremity use.  4)   Pt will return to riding his motorcycle  5)   Pt will return to full duty work     STG's (2 weeks)  1)   Pt will be I with home exercise program and modalities for pain relief   2)   Demonstrate 55 - 60 psi's of right  strength to improve functional grasp for ADLs/work/leisure activities.   3)   Decrease complaints of pain to  5 out of 10 at worst to increase functional hand use for ADL/work/leisure activities.          Plan     Pt to be treated by Occupational Therapy 2 visits for 6 weeks during the certification period from 10/5/2023 to 11/17/23 to achieve the established goals.     Treatment to include: Paraffin, Fluidotherapy, Manual therapy/joint mobilizations, Modalities for pain management, US 3 mhz, Therapeutic exercises/activities., Iontophoresis with 2.0 cc Dexamethasone, Strengthening, Orthotic Fabrication/Fit/Training, Edema Control, Scar Management, Wound Care, Electrical Modalities, Joint Protection, and Energy Conservation, as well as any other treatments deemed necessary based on the patient's needs or progress.       ROSARIO Brito/BRITTNEY

## 2023-10-09 PROBLEM — Z00.00 ANNUAL PHYSICAL EXAM: Status: RESOLVED | Noted: 2023-07-10 | Resolved: 2023-10-09

## 2023-10-09 PROBLEM — Z00.00 HEALTHCARE MAINTENANCE: Status: RESOLVED | Noted: 2023-07-10 | Resolved: 2023-10-09

## 2023-10-12 ENCOUNTER — CLINICAL SUPPORT (OUTPATIENT)
Dept: REHABILITATION | Facility: HOSPITAL | Age: 48
End: 2023-10-12
Attending: ORTHOPAEDIC SURGERY
Payer: COMMERCIAL

## 2023-10-12 DIAGNOSIS — R29.898 LEFT ARM WEAKNESS: Primary | ICD-10-CM

## 2023-10-12 DIAGNOSIS — M79.642 LEFT HAND PAIN: ICD-10-CM

## 2023-10-12 PROCEDURE — 97022 WHIRLPOOL THERAPY: CPT | Mod: PO

## 2023-10-12 PROCEDURE — 97140 MANUAL THERAPY 1/> REGIONS: CPT | Mod: PO

## 2023-10-12 PROCEDURE — 97110 THERAPEUTIC EXERCISES: CPT | Mod: PO

## 2023-10-12 PROCEDURE — 97530 THERAPEUTIC ACTIVITIES: CPT | Mod: PO

## 2023-10-12 NOTE — PROGRESS NOTES
Occupational Therapy Daily Treatment Note     Name: James Royden Peabody IV  Clinic Number: [id number]    Therapy Diagnosis:   Encounter Diagnoses   Name Primary?    Left arm weakness Yes    Left hand pain      Physician: Johnny Maguire MD    Visit Date: 10/12/2023     Physician Orders: Eval and treat   Medical Diagnosis:   G56.01 (ICD-10-CM) - Carpal tunnel syndrome of right wrist   G56.21 (ICD-10-CM) - Cubital tunnel syndrome, right      Evaluation Date: 10/5/2023  Plan of Care Certification Date: 11/17/23  Authorization Period: 10/5/2023 - 11/5/2023  Surgery Date and Procedure:   9/15/23   DECOMPRESSION, NERVE, ULNAR - right CuTR and ECTR (Right)  RELEASE, CARPAL TUNNEL, ENDOSCOPIC (Right)  Date of Return to MD: 10/23/23  FOTO Completion:     Time In:8:00 am  Time Out: 9:00 am   Total Billable Time: 60 minutes    Precautions:  Standard and Weightbearing      Subjective     Pt reports: he's been trying to use the left arm for his work activities but he's taking it easy and not doing any heavy lifting   he was compliant with home exercise program given last session.   Response to previous treatment:no adverse reaction   Functional change: he remains limited in heavier activity     Pain: 1/10  Location: right medial elbow     Objective       Mental status: alert, oriented x3     Observation:   Fully healed wrist and elbow incision     Sensation:   Volar fingertips     10/5/2023 10/5/2023     Left Right   Monofilament Testing       Normal 1.65-2.83       Diminished Light Touch 3.22-3.61 nt 2.83 digits 2-5   3.61  Thumb    Diminished Protective 3.84-4.31       Loss of Protective 4.56-6.65       Untestable >6.65             Edema: Circumferential measurements: In centimters       Left  10/5/2023 Right   10/5/2023   Proximal Wrist Crease 17.5 cm 18 cm        Left   10/5/2023 Right   10/5/2023   Elbow crease 26.5 cm 27.7 cm         Range of Motion:      Elbow and Wrist ROM. Measured in degrees.    10/5/2023 10/5/2023      Left  Right    Elbow Ext/Flex 0 / 140 0 / 150   Supination/Pronation nt nt   Wrist Ext/Flex 55 / 75 65 / 75   Wrist RD/UD 15 / 25 20 / 35         Hand ROM. Measured in degrees.  He demonstrates bilateral composite fists.       Strength: (BRISA Dynamometer in psi.)        10/5/2023 10/5/2023     Left Right   Rung II 87 45         Limitation/Restriction for FOTO Survey     Therapist reviewed FOTO scores for James Royden Peabody IV on 10/5/2023.   FOTO documents entered into [employer] - see Media section.     Intake Score: 44           Treatment       Tracie received the following supervised modalities after being cleared for contraindications for 10 minutes:   - Fluidotherapy to right arm and elbow to increase tissue elasticity and to desensitize scars     Tracie received the following manual therapy techniques for 20 minutes:   - Massage to both scars with and without tool  - STM to thenars and hypothenars   - Desensitization of elbow scar with vibrating tool   - Desensitization of elbow scar with various fabrics    Tracie received therapeutic exercises for 15 minutes including:  - TGEs  x 10 each   - Elbow 3 ways x 10  - Red clip 1 container pom pom pick ups   - Yellow putty dowel presses  2 mins     Tracie participated in dynamic functional therapeutic activities to improve functional performance for 15  minutes, including:  - In hand manipulation and translation with coins  1 container   - Screw  board - fine motor coordination   - Towel rubs to hypersensitive elbow scar       Home Exercises and Education Provided     Education provided:   - Progress towards goals     Written Home Exercises Provided: Patient instructed to cont prior HEP.  Exercises were reviewed and Tracie was able to demonstrate them prior to the end of the session.  Tracie demonstrated good  understanding of the HEP provided.   .   See EMR under Patient Instructions for exercises provided  at [facility] .        Assessment     Tracie  tolerated OT session well with reports of decreased hypersensitivity at elbow scar after manual therapies. Verbalizes understanding of precautions - light ax, avoid heavy lifting for a couple more weeks. Education on using pain and discomfort as a guide for ax. Pt would continue to benefit from skilled OT.    Tracie is progressing well towards his goals and there are no updates to goals at this time. Pt prognosis is Good.     Pt will continue to benefit from skilled outpatient occupational therapy to address the deficits listed in the problem list on initial evaluation provide pt/family education and to maximize pt's level of independence in the home and community environment.     Anticipated barriers to occupational therapy: none     Pt's spiritual, cultural and educational needs considered and pt agreeable to plan of care and goals.    Goals:    LTG's (6 weeks):  1)   Demonstrate 75-80 psi's of right  strength to grasp heavier objects at work    -Ongoing   2)   Decrease complaints of pain to  1 out of 10 at worst to increase functional hand use for ADL/work/leisure activities.     -Ongoing   3)   Patient to increase FOTO score by 10 points to demonstrate improved perception of functional right upper extremity use.       -Ongoing   4)   Pt will return to riding his motorcycle      -Ongoing   5)   Pt will return to full duty work         -Ongoing      STG's (2 weeks)  1)   Pt will be I with home exercise program and modalities for pain relief         -Ongoing   2)   Demonstrate 55 - 60 psi's of right  strength to improve functional grasp for ADLs/work/leisure activities.         -Ongoing   3)   Decrease complaints of pain to  5 out of 10 at worst to increase functional hand use for ADL/work/leisure activities.       -Ongoing               Plan      Pt to be treated by Occupational Therapy 2 visits for 6 weeks during the certification period from 10/5/2023 to 11/17/23 to achieve the established goals.       Treatment to include: Paraffin, Fluidotherapy, Manual therapy/joint mobilizations, Modalities for pain management, US 3 mhz, Therapeutic exercises/activities., Iontophoresis with 2.0 cc Dexamethasone, Strengthening, Orthotic Fabrication/Fit/Training, Edema Control, Scar Management, Wound Care, Electrical Modalities, Joint Protection, and Energy Conservation, as well as any other treatments deemed necessary based on the patient's needs or progress.          Tita Joe, OT

## 2023-10-23 ENCOUNTER — OFFICE VISIT (OUTPATIENT)
Dept: ORTHOPEDICS | Facility: CLINIC | Age: 48
End: 2023-10-23
Payer: COMMERCIAL

## 2023-10-23 DIAGNOSIS — G56.01 CARPAL TUNNEL SYNDROME OF RIGHT WRIST: Primary | ICD-10-CM

## 2023-10-23 DIAGNOSIS — Z98.890 POSTOPERATIVE STATE: ICD-10-CM

## 2023-10-23 DIAGNOSIS — G56.21 CUBITAL TUNNEL SYNDROME, RIGHT: ICD-10-CM

## 2023-10-23 PROCEDURE — 1159F MED LIST DOCD IN RCRD: CPT | Mod: CPTII,S$GLB,, | Performed by: PHYSICIAN ASSISTANT

## 2023-10-23 PROCEDURE — 3044F HG A1C LEVEL LT 7.0%: CPT | Mod: CPTII,S$GLB,, | Performed by: PHYSICIAN ASSISTANT

## 2023-10-23 PROCEDURE — 1159F PR MEDICATION LIST DOCUMENTED IN MEDICAL RECORD: ICD-10-PCS | Mod: CPTII,S$GLB,, | Performed by: PHYSICIAN ASSISTANT

## 2023-10-23 PROCEDURE — 3044F PR MOST RECENT HEMOGLOBIN A1C LEVEL <7.0%: ICD-10-PCS | Mod: CPTII,S$GLB,, | Performed by: PHYSICIAN ASSISTANT

## 2023-10-23 PROCEDURE — 99024 POSTOP FOLLOW-UP VISIT: CPT | Mod: S$GLB,,, | Performed by: PHYSICIAN ASSISTANT

## 2023-10-23 PROCEDURE — 99999 PR PBB SHADOW E&M-EST. PATIENT-LVL III: ICD-10-PCS | Mod: PBBFAC,,, | Performed by: PHYSICIAN ASSISTANT

## 2023-10-23 PROCEDURE — 99024 PR POST-OP FOLLOW-UP VISIT: ICD-10-PCS | Mod: S$GLB,,, | Performed by: PHYSICIAN ASSISTANT

## 2023-10-23 PROCEDURE — 99999 PR PBB SHADOW E&M-EST. PATIENT-LVL III: CPT | Mod: PBBFAC,,, | Performed by: PHYSICIAN ASSISTANT

## 2023-10-23 NOTE — PROGRESS NOTES
Dr. Maguire is the supervising physician for this encounter/patient    James Royden Peabody IV presents for post-operative evaluation.  The patient is now 5.5 weeks s/p Right endoscopic carpal tunnel release and cubital tunnel release at the elbow with Dr. Maguire on 9/15/23.  Overall the patient reports doing well.  He reports 3-4/10 pain, mostly pilar pain, denies any f/c/s. He reports significant improvement in his preop symptoms, and notes continued improvement. He is happy with how things are progressing. He continues to work with OT and does HEP.    PE:    AA&O x 4.  NAD  HEENT:  NCAT, sclera nonicteric  Lungs:  Respirations are equal and unlabored.  CV:  2+ bilateral upper and lower extremity pulses.  MSK: The wounds are healing well with no signs of erythema or warmth.  There is no drainage.  No clinical signs or symptoms of infection are present.  full elbow, wrist and hand motion. SILT. DNVI.    A/P: Status post above, doing well  1) Continue with OT for strengthening. Activity as tolerated.  2) Scar massage discussed.  3) F/U as needed.  4) Call with any questions/concerns in the interim      Jamie Russo-DiGeorge PA-C

## 2023-10-30 ENCOUNTER — CLINICAL SUPPORT (OUTPATIENT)
Dept: REHABILITATION | Facility: HOSPITAL | Age: 48
End: 2023-10-30
Payer: COMMERCIAL

## 2023-10-30 DIAGNOSIS — M79.642 LEFT HAND PAIN: ICD-10-CM

## 2023-10-30 DIAGNOSIS — R29.898 LEFT ARM WEAKNESS: Primary | ICD-10-CM

## 2023-10-30 PROCEDURE — 97022 WHIRLPOOL THERAPY: CPT | Mod: PO

## 2023-10-30 PROCEDURE — 97140 MANUAL THERAPY 1/> REGIONS: CPT | Mod: PO

## 2023-10-30 PROCEDURE — 97110 THERAPEUTIC EXERCISES: CPT | Mod: PO

## 2023-10-30 NOTE — PROGRESS NOTES
"  Occupational Therapy Daily Treatment Note     Name: James Royden Peabody IV  Clinic Number: [id number]    Therapy Diagnosis:   Encounter Diagnoses   Name Primary?    Left arm weakness Yes    Left hand pain      Physician: Johnny Maguire MD    Visit Date: 10/30/2023     Physician Orders: Eval and treat   Medical Diagnosis:   G56.01 (ICD-10-CM) - Carpal tunnel syndrome of right wrist   G56.21 (ICD-10-CM) - Cubital tunnel syndrome, right      Evaluation Date: 10/5/2023  Plan of Care Certification Date: 11/17/23  Authorization Period: 10/5/2023 - 11/5/2023  Surgery Date and Procedure:   9/15/23   DECOMPRESSION, NERVE, ULNAR - right CuTR and ECTR (Right)  RELEASE, CARPAL TUNNEL, ENDOSCOPIC (Right)  Date of Return to MD: 10/23/23  FOTO Completion:     Time In:8:15 am  Time Out: 9:00 am   Total Billable Time: 45 minutes    Precautions:  Standard and Weightbearing      Subjective     Pt reports: "I've been waking up in the morning with it really throbbing"  he was compliant with home exercise program given last session.   Response to previous treatment:no adverse reaction   Functional change: he remains limited in heavier activity     Pain: 1/10  Location: right medial elbow     Objective       Mental status: alert, oriented x3     Observation:   Fully healed wrist and elbow incision     Sensation:   Volar fingertips     10/5/2023 10/5/2023     Left Right   Monofilament Testing       Normal 1.65-2.83       Diminished Light Touch 3.22-3.61 nt 2.83 digits 2-5   3.61  Thumb    Diminished Protective 3.84-4.31       Loss of Protective 4.56-6.65       Untestable >6.65             Edema: Circumferential measurements: In centimters       Left  10/5/2023 Right   10/5/2023   Proximal Wrist Crease 17.5 cm 18 cm        Left   10/5/2023 Right   10/5/2023   Elbow crease 26.5 cm 27.7 cm         Range of Motion:      Elbow and Wrist ROM. Measured in degrees.    10/5/2023 10/5/2023     Left  Right    Elbow Ext/Flex 0 / 140 0 / 150 "   Supination/Pronation nt nt   Wrist Ext/Flex 55 / 75 65 / 75   Wrist RD/UD 15 / 25 20 / 35         Hand ROM. Measured in degrees.  He demonstrates bilateral composite fists.       Strength: (BRISA Dynamometer in psi.)        10/5/2023 10/5/2023 10/30/23     Left Right Right   Rung II 87 45 37 lb   Prieto pinch 20  14   3 pt pinch 17  11         Limitation/Restriction for FOTO Survey     Therapist reviewed FOTO scores for James Royden Peabody IV on 10/5/2023.   FOTO documents entered into [employer] - see Media section.     Intake Score: 44           Treatment     Tracie received the following supervised modalities after being cleared for contraindications for 10 minutes:   - Fluidotherapy to right arm and elbow to increase tissue elasticity and to desensitize scars     Tracie received the following manual therapy techniques for 10 minutes:   - Massage to both scars with and without tool  - STM to thenars and hypothenars, cupping to volar wrist incision     Tracie received therapeutic exercises for 25 minutes including:  - Reassessment of /pinch strength  - Prayer stretch (3 x 10 sec)  - Composite wrist flex stretch (3 x 30 sec)  - Tabletop weightbearing (10 reps, 5 sec hold)  - Yellow theraputty  (2 min), palm presses, 3 pt pinches opposition, digit adduction (20+ ea) - updated HEP and provided with written instructions    Tracie participated in dynamic functional therapeutic activities to improve functional performance for 0 minutes, including:  - In hand manipulation and translation with coins  1 container   - Screw  board - fine motor coordination  - Red pin repetitive pom  (1 set)      Home Exercises and Education Provided     Education provided:   - Progress towards goals     Written Home Exercises Provided: yes. Prayer stretch, tabletop weightbearing, theraputty tasks  Exercises were reviewed and Tracie was able to demonstrate them prior to the end of the session.  Tracie demonstrated good   understanding of the HEP provided.   .   See EMR under Patient Instructions for exercises provided  at al .        Assessment     Tracie tolerated treatment tasks and progression of strengthening and light weightbearing well. Will progress as tolerated.    Tracie is progressing well towards his goals and there are no updates to goals at this time. Pt prognosis is Good.     Pt will continue to benefit from skilled outpatient occupational therapy to address the deficits listed in the problem list on initial evaluation provide pt/family education and to maximize pt's level of independence in the home and community environment.     Anticipated barriers to occupational therapy: none     Pt's spiritual, cultural and educational needs considered and pt agreeable to plan of care and goals.    Goals:    LTG's (6 weeks):  1)   Demonstrate 75-80 psi's of right  strength to grasp heavier objects at work    -Ongoing   2)   Decrease complaints of pain to  1 out of 10 at worst to increase functional hand use for ADL/work/leisure activities.     -Ongoing   3)   Patient to increase FOTO score by 10 points to demonstrate improved perception of functional right upper extremity use.       -Ongoing   4)   Pt will return to riding his motorcycle      -Ongoing   5)   Pt will return to full duty work         -Ongoing      STG's (2 weeks)  1)   Pt will be I with home exercise program and modalities for pain relief         -Ongoing   2)   Demonstrate 55 - 60 psi's of right  strength to improve functional grasp for ADLs/work/leisure activities.         -Ongoing   3)   Decrease complaints of pain to  5 out of 10 at worst to increase functional hand use for ADL/work/leisure activities.       -Ongoing               Plan      Pt to be treated by Occupational Therapy 2 visits for 6 weeks during the certification period from 10/5/2023 to 11/17/23 to achieve the established goals.      Treatment to include: Paraffin, Fluidotherapy, Manual  therapy/joint mobilizations, Modalities for pain management, US 3 mhz, Therapeutic exercises/activities., Iontophoresis with 2.0 cc Dexamethasone, Strengthening, Orthotic Fabrication/Fit/Training, Edema Control, Scar Management, Wound Care, Electrical Modalities, Joint Protection, and Energy Conservation, as well as any other treatments deemed necessary based on the patient's needs or progress.          Pati Wooten, OT

## 2023-10-30 NOTE — PATIENT INSTRUCTIONS
"[facility], OCCUPATIONAL THERAPY  HOME EXERCISE PROGRAM       Composite wrist flexion stretch:  -Elbow straight and palm down, make a gentle fist  -Use your other hand to gently bend fist down towards the floor  -Should feel a gentle stretch, no pain! If this is too painful, can bend the elbow to the point you feel a comfortable stretch    Hold 30 seconds. Repeat 3x.      Composite wrist extension stretch:  -Hold arm straight out in front of you, elbow extended, and palm up  -Use other hand to bend the wrist back  -Make sure to bend at the wrist, not at the finger tips! Avoid hyperextension of the fingers    Hold for 30 seconds. Repeat 3x.              Composite Extension (Passive Flexor Stretch)  Sitting with elbows on table and palms together, slowly lower wrists toward table until stretch is felt. Be sure to keep palms together throughout stretch.     Hold for 30 seconds. Repeat 3x.    Complete the following strengthening program 1x/day.        /Squeezes  - Squeeze putty, gripping for a max of 3 - 5 min                Three Point Pinches  - Roll putty into a log  - Pinch along with the thumb to each finger.  - Make sure to keep an "O"           Adduction  - Place putty between fingers  - Squeeze the fingers together, into the putty      ROSARIO Romano/L     *can also do gentle weightbearing on a rolled up towel or the presses into the putty    "

## 2023-11-06 ENCOUNTER — CLINICAL SUPPORT (OUTPATIENT)
Dept: REHABILITATION | Facility: HOSPITAL | Age: 48
End: 2023-11-06
Payer: COMMERCIAL

## 2023-11-06 DIAGNOSIS — R29.898 LEFT ARM WEAKNESS: Primary | ICD-10-CM

## 2023-11-06 DIAGNOSIS — M79.642 LEFT HAND PAIN: ICD-10-CM

## 2023-11-06 PROCEDURE — 97022 WHIRLPOOL THERAPY: CPT | Mod: PO

## 2023-11-06 PROCEDURE — 97530 THERAPEUTIC ACTIVITIES: CPT | Mod: PO

## 2023-11-06 PROCEDURE — 97110 THERAPEUTIC EXERCISES: CPT | Mod: PO

## 2023-11-06 PROCEDURE — 97140 MANUAL THERAPY 1/> REGIONS: CPT | Mod: PO

## 2023-11-06 NOTE — PROGRESS NOTES
"  Occupational Therapy Daily Treatment Note     Name: James Royden Peabody IV  Clinic Number: [id number]    Therapy Diagnosis:   Encounter Diagnoses   Name Primary?    Left arm weakness Yes    Left hand pain      Physician: Johnny Maguire MD    Visit Date: 11/6/2023     Physician Orders: Eval and treat   Medical Diagnosis:   G56.01 (ICD-10-CM) - Carpal tunnel syndrome of right wrist   G56.21 (ICD-10-CM) - Cubital tunnel syndrome, right      Evaluation Date: 10/5/2023  Plan of Care Certification Date: 11/17/23  Authorization Period: 10/5/2023 - 11/5/2023  Surgery Date and Procedure:   9/15/23   DECOMPRESSION, NERVE, ULNAR - right CuTR and ECTR (Right)  RELEASE, CARPAL TUNNEL, ENDOSCOPIC (Right)  Date of Return to MD: TBD  FOTO Completion:     Time In:7:30 am  Time Out: 8:15 am   Total Billable Time: 45 minutes    Precautions:  Standard and Weightbearing      Subjective     Pt reports: "My elbow feels tight in the morning. I rode my motorcycle over the weekend and it felt okay"  he was compliant with home exercise program given last session.   Response to previous treatment:no adverse reaction   Functional change: he remains limited in heavier activity     Pain: 3/10  Location: right medial elbow     Objective       Mental status: alert, oriented x3     Observation:   Fully healed wrist and elbow incision     Sensation:   Volar fingertips     10/5/2023 10/5/2023     Left Right   Monofilament Testing       Normal 1.65-2.83       Diminished Light Touch 3.22-3.61 nt 2.83 digits 2-5   3.61  Thumb    Diminished Protective 3.84-4.31       Loss of Protective 4.56-6.65       Untestable >6.65             Edema: Circumferential measurements: In centimters       Left  10/5/2023 Right   10/5/2023   Proximal Wrist Crease 17.5 cm 18 cm        Left   10/5/2023 Right   10/5/2023   Elbow crease 26.5 cm 27.7 cm         Range of Motion:      Elbow and Wrist ROM. Measured in degrees.    10/5/2023 10/5/2023     Left  Right    Elbow " Ext/Flex 0 / 140 0 / 150   Supination/Pronation nt nt   Wrist Ext/Flex 55 / 75 65 / 75   Wrist RD/UD 15 / 25 20 / 35         Hand ROM. Measured in degrees.  He demonstrates bilateral composite fists.       Strength: (BRISA Dynamometer in psi.)        10/5/2023 10/5/2023 10/30/23     Left Right Right   Rung II 87 45 37 lb   Prieto pinch 20  14   3 pt pinch 17  11         Limitation/Restriction for FOTO Survey     Therapist reviewed FOTO scores for James Royden Peabody IV on 10/5/2023.   FOTO documents entered into [employer] - see Media section.     Intake Score: 44           Treatment     Tracie received the following supervised modalities after being cleared for contraindications for 10 minutes:   - Fluidotherapy to right arm and elbow to increase tissue elasticity and to desensitize scars       Tracie received the following manual therapy techniques for 10 minutes:   - IASTM about incision sites, surrounding area and flexor pathway  - STM to thenars and hypothenars, cupping to volar wrist incision       Tracie received therapeutic exercises for 17 minutes including:  - Tendon glides (10 ea)  - Prayer stretch (3 x 30 sec)  - Composite wrist flex stretch (3 x 30 sec)  - Tabletop weightbearing (10 reps, 5 sec hold)  - Yellow flexbar wrist flex/ext, pro/sup (1 x 10)      Tracie participated in dynamic functional therapeutic activities to improve functional performance for 8 minutes, including:  - Red pin repetitive pom  (1 set)  - Resistive gripper 2nd rung, repetitive pom  (1 set)      Performing in HEP:  - Yellow theraputty  (2 min), palm presses, 3 pt pinches opposition, digit adduction (20+ ea)       Home Exercises and Education Provided     Education provided:   - Progress towards goals     Written Home Exercises Provided: Patient instructed to cont prior HEP.  Exercises were reviewed and Tracie was able to demonstrate them prior to the end of the session.  Tracie demonstrated good  understanding of  the HEP provided.   .   See EMR under Patient Instructions for exercises provided  at [facility] .        Assessment     Tracie tolerated treatment tasks and progression of strengthening and light weightbearing well. Will progress as tolerated.    Tracie is progressing well towards his goals and there are no updates to goals at this time. Pt prognosis is Good.     Pt will continue to benefit from skilled outpatient occupational therapy to address the deficits listed in the problem list on initial evaluation provide pt/family education and to maximize pt's level of independence in the home and community environment.     Anticipated barriers to occupational therapy: none     Pt's spiritual, cultural and educational needs considered and pt agreeable to plan of care and goals.    Goals:    LTG's (6 weeks):  1)   Demonstrate 75-80 psi's of right  strength to grasp heavier objects at work    -Ongoing   2)   Decrease complaints of pain to  1 out of 10 at worst to increase functional hand use for ADL/work/leisure activities.     -Ongoing   3)   Patient to increase FOTO score by 10 points to demonstrate improved perception of functional right upper extremity use.       -Ongoing   4)   Pt will return to riding his motorcycle    - Met 11/6/23  5)   Pt will return to full duty work         -Ongoing      STG's (2 weeks)  1)   Pt will be I with home exercise program and modalities for pain relief     - Met 11/6/23   2)   Demonstrate 55 - 60 psi's of right  strength to improve functional grasp for ADLs/work/leisure activities.         -Ongoing   3)   Decrease complaints of pain to  5 out of 10 at worst to increase functional hand use for ADL/work/leisure activities.   - Met 11/6/23              Plan      Pt to be treated by Occupational Therapy 2 visits for 6 weeks during the certification period from 10/5/2023 to 11/17/23 to achieve the established goals.      Treatment to include: Paraffin, Fluidotherapy, Manual therapy/joint  mobilizations, Modalities for pain management, US 3 mhz, Therapeutic exercises/activities., Iontophoresis with 2.0 cc Dexamethasone, Strengthening, Orthotic Fabrication/Fit/Training, Edema Control, Scar Management, Wound Care, Electrical Modalities, Joint Protection, and Energy Conservation, as well as any other treatments deemed necessary based on the patient's needs or progress.          Pati Wooten, OT

## 2023-11-13 ENCOUNTER — CLINICAL SUPPORT (OUTPATIENT)
Dept: REHABILITATION | Facility: HOSPITAL | Age: 48
End: 2023-11-13
Payer: COMMERCIAL

## 2023-11-13 DIAGNOSIS — R29.898 LEFT ARM WEAKNESS: Primary | ICD-10-CM

## 2023-11-13 DIAGNOSIS — M79.642 LEFT HAND PAIN: ICD-10-CM

## 2023-11-13 PROCEDURE — 97022 WHIRLPOOL THERAPY: CPT | Mod: PO

## 2023-11-13 PROCEDURE — 97140 MANUAL THERAPY 1/> REGIONS: CPT | Mod: PO

## 2023-11-13 PROCEDURE — 97530 THERAPEUTIC ACTIVITIES: CPT | Mod: PO

## 2023-11-13 PROCEDURE — 97110 THERAPEUTIC EXERCISES: CPT | Mod: PO

## 2023-11-13 NOTE — PROGRESS NOTES
"  Occupational Therapy Daily Treatment Note     Name: James Royden Peabody IV  Clinic Number: [id number]    Therapy Diagnosis:   Encounter Diagnoses   Name Primary?    Left arm weakness Yes    Left hand pain      Physician: Johnny Maguire MD    Visit Date: 11/13/2023     Physician Orders: Eval and treat   Medical Diagnosis:   G56.01 (ICD-10-CM) - Carpal tunnel syndrome of right wrist   G56.21 (ICD-10-CM) - Cubital tunnel syndrome, right      Evaluation Date: 10/5/2023  Plan of Care Certification Date: 11/17/23  Authorization Period: 10/5/2023 - 11/5/2023  Surgery Date and Procedure:   9/15/23   DECOMPRESSION, NERVE, ULNAR - right CuTR and ECTR (Right)  RELEASE, CARPAL TUNNEL, ENDOSCOPIC (Right)  Date of Return to MD: TBD  FOTO Completion:     Time In:8:15 am  Time Out: 9:00 am   Total Billable Time: 45 minutes    Precautions:  Standard and Weightbearing      Subjective     Pt reports: "It feels really achy with this weather"  he was compliant with home exercise program given last session.   Response to previous treatment:no adverse reaction   Functional change: he remains limited in heavier activity     Pain: 3/10  Location: right medial elbow     Objective       Mental status: alert, oriented x3     Observation:   Fully healed wrist and elbow incision     Sensation:   Volar fingertips     10/5/2023 10/5/2023     Left Right   Monofilament Testing       Normal 1.65-2.83       Diminished Light Touch 3.22-3.61 nt 2.83 digits 2-5   3.61  Thumb    Diminished Protective 3.84-4.31       Loss of Protective 4.56-6.65       Untestable >6.65             Edema: Circumferential measurements: In centimters       Left  10/5/2023 Right   10/5/2023   Proximal Wrist Crease 17.5 cm 18 cm        Left   10/5/2023 Right   10/5/2023   Elbow crease 26.5 cm 27.7 cm         Range of Motion:      Elbow and Wrist ROM. Measured in degrees.    10/5/2023 10/5/2023     Left  Right    Elbow Ext/Flex 0 / 140 0 / 150   Supination/Pronation nt nt "   Wrist Ext/Flex 55 / 75 65 / 75   Wrist RD/UD 15 / 25 20 / 35         Hand ROM. Measured in degrees.  He demonstrates bilateral composite fists.       Strength: (BRISA Dynamometer in psi.)        10/5/2023 10/5/2023 10/30/23 11/13/23     Left Right Right Right   Rung II 87 45 37 lb 49, 47, 36    44 lb avg   Key pinch 20  14 14   3 pt pinch 17  11 13         Limitation/Restriction for FOTO Survey     Therapist reviewed FOTO scores for James Royden Peabody IV on 10/5/2023.   FOTO documents entered into [employer] - see Media section.     Intake Score: 44           Treatment     Tracie received the following supervised modalities after being cleared for contraindications for 10 minutes:   - Fluidotherapy to right arm and elbow to increase tissue elasticity and to desensitize scars       Tracie received the following manual therapy techniques for 10 minutes:   - IASTM about incision sites, surrounding area and flexor pathway  - STM to thenars and hypothenars, cupping to volar wrist incision       Tracie received therapeutic exercises for 13 minutes including:  - Prayer stretch (3 x 30 sec) - performing in HEP  - Composite wrist flex stretch (3 x 30 sec)  - Tabletop weightbearing (10 reps, 5 sec hold)  - Red flexbar pro/sup (2 x 10)  - Wrist curls flex/ext, 3# (2 x 10)      Tracie participated in dynamic functional therapeutic activities to improve functional performance for 12 minutes, including:  - Green pin repetitive pom  (2 sets)  - Resistive gripper 2nd rung, repetitive pom  (2 sets)      Performing in HEP:  - Yellow or red theraputty  (2 min), palm presses, 3 pt pinches opposition, digit adduction (20+ ea)       Home Exercises and Education Provided     Education provided:   - Progress towards goals     Written Home Exercises Provided: Patient instructed to cont prior HEP.  Exercises were reviewed and Tracie was able to demonstrate them prior to the end of the session.  Tracie demonstrated good   understanding of the HEP provided.   .   See EMR under Patient Instructions for exercises provided  at [facility] .        Assessment     Tracie tolerated treatment tasks and progression of strengthening well. He will continue with HEP and recheck to reassess objective measurements and progerss strengthening in 3-4 weeks. Will progress as tolerated.    Tracie is progressing well towards his goals and there are no updates to goals at this time. Pt prognosis is Good.     Pt will continue to benefit from skilled outpatient occupational therapy to address the deficits listed in the problem list on initial evaluation provide pt/family education and to maximize pt's level of independence in the home and community environment.     Anticipated barriers to occupational therapy: none     Pt's spiritual, cultural and educational needs considered and pt agreeable to plan of care and goals.    Goals:    LTG's (6 weeks):  1)   Demonstrate 75-80 psi's of right  strength to grasp heavier objects at work    -Ongoing   2)   Decrease complaints of pain to  1 out of 10 at worst to increase functional hand use for ADL/work/leisure activities.     -Ongoing   3)   Patient to increase FOTO score by 10 points to demonstrate improved perception of functional right upper extremity use.       -Ongoing   4)   Pt will return to riding his motorcycle    - Met 11/6/23  5)   Pt will return to full duty work         -Ongoing      STG's (2 weeks)  1)   Pt will be I with home exercise program and modalities for pain relief     - Met 11/6/23   2)   Demonstrate 55 - 60 psi's of right  strength to improve functional grasp for ADLs/work/leisure activities.         -Ongoing   3)   Decrease complaints of pain to  5 out of 10 at worst to increase functional hand use for ADL/work/leisure activities.   - Met 11/6/23              Plan      Pt to be treated by Occupational Therapy 2 visits for 6 weeks during the certification period from 10/5/2023 to 11/17/23  to achieve the established goals.      Treatment to include: Paraffin, Fluidotherapy, Manual therapy/joint mobilizations, Modalities for pain management, US 3 mhz, Therapeutic exercises/activities., Iontophoresis with 2.0 cc Dexamethasone, Strengthening, Orthotic Fabrication/Fit/Training, Edema Control, Scar Management, Wound Care, Electrical Modalities, Joint Protection, and Energy Conservation, as well as any other treatments deemed necessary based on the patient's needs or progress.          Pati Wooten, OT

## 2023-11-29 ENCOUNTER — PATIENT MESSAGE (OUTPATIENT)
Dept: INTERNAL MEDICINE | Facility: CLINIC | Age: 48
End: 2023-11-29
Payer: COMMERCIAL

## 2023-12-12 ENCOUNTER — CLINICAL SUPPORT (OUTPATIENT)
Dept: REHABILITATION | Facility: HOSPITAL | Age: 48
End: 2023-12-12
Payer: COMMERCIAL

## 2023-12-12 DIAGNOSIS — M79.642 LEFT HAND PAIN: ICD-10-CM

## 2023-12-12 DIAGNOSIS — R29.898 LEFT ARM WEAKNESS: Primary | ICD-10-CM

## 2023-12-12 PROCEDURE — 97110 THERAPEUTIC EXERCISES: CPT | Mod: PO

## 2023-12-12 PROCEDURE — 97022 WHIRLPOOL THERAPY: CPT | Mod: PO

## 2023-12-12 NOTE — PROGRESS NOTES
"  Occupational Therapy Re-eval and Discharge Note     Name: James Royden Peabody IV  Clinic Number: [id number]    Therapy Diagnosis:   Encounter Diagnoses   Name Primary?    Left arm weakness Yes    Left hand pain      Physician: Johnny Maguire MD    Visit Date: 12/12/2023     Physician Orders: Eval and treat   Medical Diagnosis:   G56.01 (ICD-10-CM) - Carpal tunnel syndrome of right wrist   G56.21 (ICD-10-CM) - Cubital tunnel syndrome, right      Evaluation Date: 10/5/2023  Plan of Care Certification Date: 11/17/23  Authorization Period: 10/5/2023 - 11/5/2023  Surgery Date and Procedure:   9/15/23   DECOMPRESSION, NERVE, ULNAR - right CuTR and ECTR (Right)  RELEASE, CARPAL TUNNEL, ENDOSCOPIC (Right)  Date of Return to MD: TBD  FOTO Completion: 2/3    Time In: 7:30 am  Time Out: 8:10 am   Total Billable Time: 40 minutes    Precautions:  standard      Subjective     Pt reports: "It only hurts if it's really cold out but it's gotten a lot better"  he was compliant with home exercise program given last session.   Response to previous treatment:no adverse reaction   Functional change: he remains limited in heavier activity with pressure in the palm    Pain: 2/10  Location: right medial elbow     Objective       Mental status: alert, oriented x3     Observation:   Fully healed wrist and elbow incision     Sensation:   Volar fingertips     10/5/2023 10/5/2023 12/12/23     Left Right Right   Monofilament Testing        Normal 1.65-2.83        Diminished Light Touch 3.22-3.61 nt 2.83 digits 2-5   3.61  Thumb  2.83 digits 2-5   3.61  Thumb    Diminished Protective 3.84-4.31        Loss of Protective 4.56-6.65        Untestable >6.65              Edema: Circumferential measurements: In centimters       Left  10/5/2023 Right   10/5/2023   Proximal Wrist Crease 17.5 cm 18 cm        Left   10/5/2023 Right   10/5/2023   Elbow crease 26.5 cm 27.7 cm         Range of Motion:      Elbow and Wrist ROM. Measured in degrees.    " 10/5/2023 10/5/2023     Left  Right    Elbow Ext/Flex 0 / 140 0 / 150   Supination/Pronation nt nt   Wrist Ext/Flex 55 / 75 65 / 75   Wrist RD/UD 15 / 25 20 / 35         Hand ROM. Measured in degrees.  He demonstrates bilateral composite fists.       Strength: (BRISA Dynamometer in psi.)        10/5/2023 10/5/2023 10/30/23 11/13/23 12/12/23     Left Right Right Right Right   Rung II 87 45 37 lb 49, 47, 36    44 lb avg 55, 51, 52    53 lb   Prieto pinch 20  14 14 19   3 pt pinch 17  11 13 14         Limitation/Restriction for FOTO Survey     Therapist reviewed FOTO scores for James Royden Peabody IV on 10/5/2023.   FOTO documents entered into [employer] - see Media section.     Intake Score: 44           Treatment     Tracie received the following supervised modalities after being cleared for contraindications for 10 minutes:   - Fluidotherapy to right arm and elbow to increase tissue elasticity and to desensitize scars       Tracie received therapeutic exercises for 30 minutes including:  - Reassessment of objective measurements  - Composite wrist flex stretch (3 x 30 sec)  - Tabletop weightbearing (10 reps, 5 sec hold)  - Green flexbar pro/sup (2 x 10)  - Wrist curls flex/ext, 5# (2 x 10)  - Resistive gripper 3nd rung, repetitive pom  (1 set)      Performing in HEP:  - Red theraputty  (2 min), palm presses, 3 pt pinches opposition, digit adduction (20+ ea)       Home Exercises and Education Provided     Education provided:   - Progress towards goals     Written Home Exercises Provided: Patient instructed to cont prior HEP.  Exercises were reviewed and Tracie was able to demonstrate them prior to the end of the session.  Tracie demonstrated good  understanding of the HEP provided.   .   See EMR under Patient Instructions for exercises provided  at [facility] .        Assessment     Tracie has met all STG and 4/5 LTG with exception of  strength, although this is much improved since [facility] and progressing as  expected. He is independent with HEP and ADL/IADLs at home. He reports significant improvement in functional use of RUE since surgical procedure such as being able to ride bike, cut food, etc. He will continue independently with HEP and knows to call with any questions or concerns.    Anticipated barriers to occupational therapy: none     Pt's spiritual, cultural and educational needs considered and pt agreeable to plan of care and goals.    Goals:    LTG's (6 weeks):  1)   Demonstrate 75-80 psi's of right  strength to grasp heavier objects at work    -Ongoing, will continue to progress independently with HEP   2)   Decrease complaints of pain to  1 out of 10 at worst to increase functional hand use for ADL/work/leisure activities.   - Met 12/12/23  3)   Patient to increase FOTO score by 10 points to demonstrate improved perception of functional right upper extremity use.    - Met 12/12/23  4)   Pt will return to riding his motorcycle    - Met 11/6/23  5)   Pt will return to full duty work     - Met 12/12/23     STG's (2 weeks)  1)   Pt will be I with home exercise program and modalities for pain relief     - Met 11/6/23   2)   Demonstrate 55 - 60 psi's of right  strength to improve functional grasp for ADLs/work/leisure activities.      - Met 12/12/23  3)   Decrease complaints of pain to  5 out of 10 at worst to increase functional hand use for ADL/work/leisure activities.   - Met 11/6/23              Plan      Plan to progress independently with HEP.      Pati Wooten, OT

## 2023-12-14 ENCOUNTER — PATIENT MESSAGE (OUTPATIENT)
Dept: INTERNAL MEDICINE | Facility: CLINIC | Age: 48
End: 2023-12-14
Payer: COMMERCIAL

## 2024-01-10 ENCOUNTER — OFFICE VISIT (OUTPATIENT)
Dept: INTERNAL MEDICINE | Facility: CLINIC | Age: 49
End: 2024-01-10
Payer: COMMERCIAL

## 2024-01-10 ENCOUNTER — TELEPHONE (OUTPATIENT)
Dept: SURGERY | Facility: CLINIC | Age: 49
End: 2024-01-10
Payer: COMMERCIAL

## 2024-01-10 VITALS
HEIGHT: 69 IN | WEIGHT: 193.56 LBS | HEART RATE: 68 BPM | BODY MASS INDEX: 28.67 KG/M2 | DIASTOLIC BLOOD PRESSURE: 91 MMHG | SYSTOLIC BLOOD PRESSURE: 152 MMHG

## 2024-01-10 DIAGNOSIS — F10.11 H/O ALCOHOL ABUSE: ICD-10-CM

## 2024-01-10 DIAGNOSIS — F17.200 TOBACCO USE DISORDER: Primary | ICD-10-CM

## 2024-01-10 DIAGNOSIS — K43.9 HERNIA OF ANTERIOR ABDOMINAL WALL: ICD-10-CM

## 2024-01-10 DIAGNOSIS — Z00.00 ANNUAL PHYSICAL EXAM: ICD-10-CM

## 2024-01-10 PROCEDURE — 3008F BODY MASS INDEX DOCD: CPT | Mod: CPTII,S$GLB,, | Performed by: STUDENT IN AN ORGANIZED HEALTH CARE EDUCATION/TRAINING PROGRAM

## 2024-01-10 PROCEDURE — 99999 PR PBB SHADOW E&M-EST. PATIENT-LVL IV: CPT | Mod: PBBFAC,,, | Performed by: STUDENT IN AN ORGANIZED HEALTH CARE EDUCATION/TRAINING PROGRAM

## 2024-01-10 PROCEDURE — 3077F SYST BP >= 140 MM HG: CPT | Mod: CPTII,S$GLB,, | Performed by: STUDENT IN AN ORGANIZED HEALTH CARE EDUCATION/TRAINING PROGRAM

## 2024-01-10 PROCEDURE — 1160F RVW MEDS BY RX/DR IN RCRD: CPT | Mod: CPTII,S$GLB,, | Performed by: STUDENT IN AN ORGANIZED HEALTH CARE EDUCATION/TRAINING PROGRAM

## 2024-01-10 PROCEDURE — 99214 OFFICE O/P EST MOD 30 MIN: CPT | Mod: S$GLB,,, | Performed by: STUDENT IN AN ORGANIZED HEALTH CARE EDUCATION/TRAINING PROGRAM

## 2024-01-10 PROCEDURE — 1159F MED LIST DOCD IN RCRD: CPT | Mod: CPTII,S$GLB,, | Performed by: STUDENT IN AN ORGANIZED HEALTH CARE EDUCATION/TRAINING PROGRAM

## 2024-01-10 PROCEDURE — 3080F DIAST BP >= 90 MM HG: CPT | Mod: CPTII,S$GLB,, | Performed by: STUDENT IN AN ORGANIZED HEALTH CARE EDUCATION/TRAINING PROGRAM

## 2024-01-10 NOTE — PATIENT INSTRUCTIONS
Challenges:  - Reduce the number of drinks to no more than 6 to 8 per week  - cut down 1 cigarette daily and increase the number of cigarettes to take out of the pack weekly

## 2024-01-10 NOTE — ASSESSMENT & PLAN NOTE
Unchanged.  Patient continues to 1 pack a day.    Patient has smoked for 35 years.    Has been more than 50 minutes counseling patient about smoking cessation.    Patient still not ready with smoking but he is thinking about it.    He does not want to take medications at this point for smoking cessation.    I have challenged patient to decrease 1 cigarette daily and then increase the number of cigarettes that he will not smoke per week.    Patient accepts the challenge.

## 2024-01-10 NOTE — ASSESSMENT & PLAN NOTE
Anterior abdominal wall hernia located in the left upper quadrant noted.    History of abdominal hernia repair with mesh.    We will refer to General surgery for evaluation

## 2024-01-10 NOTE — PROGRESS NOTES
Subjective:       Patient ID: James Royden Peabody IV is a 48 y.o. male.    Chief Complaint: Follow-up (Does have an abd hernia.)    HPI    James Royden Peabody IV is a 48 y.o. male , English speaking, with a history of:  HLD  Alcohol abuse  Chronic active tobacco use  Cervical foraminal stenosis and BUE radiculopathy  BL carpal tunnel Sx  GERD  H/o esophageal stricture s/p dilation EGD in 2019  H/o colon polyps  Chronic right shoulder pain  H/o HSV2 infection      [Local Patient]  Originally from [facility]  Lives in: [place] [id number]       Patient comes to the clinic for a follow-up of alcohol use and tobacco use.      During the past year patient has had multiple encounters with sports Medicine or Orthopedics for his shoulder pain.  He has also had multiple encounters with physical therapy.      He reports he has been doing better overall, he has found a better work/life balance.    He continues to drink on the weekends with friends between 6-12 drinks (beer).  He is still interested in cutting down more.    He continues to smoke 1 pack a day.  He wants to quit smoking but he has not ready yet.      He is working on his mental health and doing psychotherapy.    He is also journaling.    He feels better.    Patient denies unintentional weight loss, chest pain, shortness for breath, cough.    No other complaints or concerns      Since last seen by me:      7/10/23 AWV - establish care      Changes in health or medications: No    Specialists visits and recommendations:        H/o ER visits:   NO    H/o Hospitalizations:  NO    H/o falls: None     Life events / lifestyle:   Nothing new      Most recent laboratories reviewed:    Recent Labs   Lab 05/14/21  0712 07/10/23  0827   WBC 6.72 6.34   Hemoglobin 15.1 14.9   Hematocrit 45.8 44.2    H 100 H   Platelets 251 242       Recent Labs   Lab 05/14/21  0712 07/10/23  0827   Glucose 84 92   Sodium 140 139   Potassium 4.9 4.5   BUN 16 14   Creatinine 0.9 0.8   eGFR if  non  >60.0  --    Total Bilirubin 0.5 0.5   AST 29 21   ALT 34 19       Recent Labs   Lab 07/10/23  0827   Hemoglobin A1C 5.2       Recent Labs   Lab 05/14/21 0712 07/10/23  0827   Cholesterol 228 H 267 H   Triglycerides 167 H 91   HDL 54 61   LDL Cholesterol 140.6 187.8 H   Non-HDL Cholesterol 174 206       Recent Labs   Lab 05/14/21  0712 07/10/23  0827   TSH 0.849 1.216   PSA, Screen  --  0.95       Recent Labs   Lab 05/14/21 0712   HIV 1/2 Ag/Ab Negative   Hepatitis C Ab Negative         Current medications:    Current Outpatient Medications:     acetaminophen (TYLENOL) 500 MG tablet, Take 2 tablets (1,000 mg total) by mouth every 8 (eight) hours., Disp: 60 tablet, Rfl: 0    cyclobenzaprine (FLEXERIL) 10 MG tablet, Take 1 tablet (10 mg total) by mouth 3 (three) times daily as needed for Muscle spasms. (Patient taking differently: Take 10 mg by mouth 3 (three) times daily as needed for Muscle spasms. AS NEEDED), Disp: 60 tablet, Rfl: 1    gabapentin (NEURONTIN) 300 MG capsule, Take 1 capsule (300 mg total) by mouth 3 (three) times daily. (Patient taking differently: Take 300 mg by mouth 3 (three) times daily. AS NEEDED), Disp: 60 capsule, Rfl: 1    ibuprofen (ADVIL,MOTRIN) 600 MG tablet, Take 1 tablet (600 mg total) by mouth every 6 (six) hours as needed for Pain. (Patient taking differently: Take 600 mg by mouth every 6 (six) hours as needed for Pain. AS NEEDED), Disp: 24 tablet, Rfl: 0    meloxicam (MOBIC) 15 MG tablet, Take 1 tablet (15 mg total) by mouth once daily. (Patient taking differently: Take 15 mg by mouth once daily. AS NEEDED), Disp: 60 tablet, Rfl: 1    omeprazole (PRILOSEC) 40 MG capsule, Take 1 capsule (40 mg total) by mouth once daily., Disp: 90 capsule, Rfl: 3    simvastatin (ZOCOR) 40 MG tablet, Take 1 tablet (40 mg total) by mouth every evening., Disp: 30 tablet, Rfl: 11    traMADoL (ULTRAM) 50 mg tablet, Take 1 tablet (50 mg total) by mouth every 6 (six) hours. (Patient  "taking differently: Take 50 mg by mouth every 6 (six) hours. AS NEEDED), Disp: 10 tablet, Rfl: 0    valACYclovir (VALTREX) 1000 MG tablet, Take 1 tablet (1,000 mg total) by mouth every 12 (twelve) hours. for 7 days, Disp: 14 tablet, Rfl: 0      Healthcare Maintenance:  Colon cancer screening:   Last Colonoscopy completed on 1/6/2022   EGD 2022  Vaccinations: Pneumonia (NO), Zoster (no), Tetanus 2016  COVID vaccination: completed x 3  Depression screening: PHQ2 score = 0     Annual visit approx. Month: July    ROS  11-point review of systems done. Negative except for detailed in the HPI.        Objective:      BP (!) 152/91   Pulse 68   Ht 5' 9" (1.753 m)   Wt 87.8 kg (193 lb 9 oz)   BMI 28.58 kg/m²     Physical Exam   Physical Exam  Vitals and nursing note reviewed.   Constitutional:       Appearance: Normal appearance.   HENT:      Head: Normocephalic and atraumatic.      Right Ear: Tympanic membrane normal.      Left Ear: Tympanic membrane normal.      Nose: Nose normal.      Mouth/Throat:      Mouth: Mucous membranes are moist.      Pharynx: Oropharynx is clear.   Eyes:      Extraocular Movements: Extraocular movements intact.      Conjunctiva/sclera: Conjunctivae normal.      Pupils: Pupils are equal, round, and reactive to light.   Cardiovascular:      Rate and Rhythm: Normal rate and regular rhythm.      Pulses: Normal pulses.      Heart sounds: Normal heart sounds.   Pulmonary:      Effort: Pulmonary effort is normal.      Breath sounds: Normal breath sounds.   Abdominal:      General: Bowel sounds are normal. There is no distension.      Palpations: Abdomen is soft.      Tenderness: There is no abdominal tenderness.   Musculoskeletal:         General: Normal range of motion.      Cervical back: Normal range of motion and neck supple.   Skin:     General: Skin is warm.   Neurological:      General: No focal deficit present.      Mental Status: Alert and oriented to person, place, and time. Mental status " is at baseline.   Psychiatric:         Mood and Affect: Mood normal.           Assessment:       1. Tobacco use disorder  Assessment & Plan:  Unchanged.  Patient continues to 1 pack a day.    Patient has smoked for 35 years.    Has been more than 50 minutes counseling patient about smoking cessation.    Patient still not ready with smoking but he is thinking about it.    He does not want to take medications at this point for smoking cessation.    I have challenged patient to decrease 1 cigarette daily and then increase the number of cigarettes that he will not smoke per week.    Patient accepts the challenge.    Orders:  -     X-Ray Chest PA And Lateral; Future; Expected date: 07/10/2024    2. H/O alcohol abuse  Assessment & Plan:  Unchanged.    Patient continues to drink during the weekends 6-12 beers with friends.    We have discussed alcohol cessation.  I have encouraged patient to decrease the number of alcohol beverages consumed during the weekends, maximum 6-8.          3. Hernia of anterior abdominal wall  Assessment & Plan:  Anterior abdominal wall hernia located in the left upper quadrant noted.    History of abdominal hernia repair with mesh.    We will refer to General surgery for evaluation    Orders:  -     Ambulatory referral/consult to General Surgery; Future; Expected date: 01/17/2024    4. Annual physical exam  -     CBC Auto Differential; Future; Expected date: 07/10/2024  -     Comprehensive Metabolic Panel; Future; Expected date: 07/10/2024  -     Hemoglobin A1C; Future; Expected date: 07/10/2024  -     Lipid Panel; Future; Expected date: 07/10/2024  -     EKG 12-lead; Future; Expected date: 07/10/2024       Plan:       Extensive counseling provided for alcohol cessation and tobacco cessation.        Patient Instructions   Challenges:  - Reduce the number of drinks to no more than 6 to 8 per week  - cut down 1 cigarette daily and increase the number of cigarettes to take out of the pack weekly      Problem list updated, medications reconciled, education provided  Lifestyle recommendations given  AVS printed, explained, and given to the patient.  RTC in :  6 months for annual wellness visit with labs, ordered            ANDRES ALDRICH MD, MPH  Internal Medicine  [facility]

## 2024-01-10 NOTE — ASSESSMENT & PLAN NOTE
Unchanged.    Patient continues to drink during the weekends 6-12 beers with friends.    We have discussed alcohol cessation.  I have encouraged patient to decrease the number of alcohol beverages consumed during the weekends, maximum 6-8.

## 2024-01-31 ENCOUNTER — OFFICE VISIT (OUTPATIENT)
Dept: SURGERY | Facility: CLINIC | Age: 49
End: 2024-01-31
Payer: COMMERCIAL

## 2024-01-31 DIAGNOSIS — K43.9 HERNIA OF ANTERIOR ABDOMINAL WALL: ICD-10-CM

## 2024-01-31 PROCEDURE — 99203 OFFICE O/P NEW LOW 30 MIN: CPT | Mod: 95,,, | Performed by: STUDENT IN AN ORGANIZED HEALTH CARE EDUCATION/TRAINING PROGRAM

## 2024-01-31 PROCEDURE — 1159F MED LIST DOCD IN RCRD: CPT | Mod: CPTII,95,, | Performed by: STUDENT IN AN ORGANIZED HEALTH CARE EDUCATION/TRAINING PROGRAM

## 2024-01-31 PROCEDURE — 1160F RVW MEDS BY RX/DR IN RCRD: CPT | Mod: CPTII,95,, | Performed by: STUDENT IN AN ORGANIZED HEALTH CARE EDUCATION/TRAINING PROGRAM

## 2024-01-31 NOTE — PROGRESS NOTES
Audio Only Telehealth Visit     The patient location is: home  The chief complaint leading to consultation is: ventral hernia  Visit type: Virtual visit with audio only (telephone)  Total time spent with patient: 10min     The reason for the audio only service rather than synchronous audio and video virtual visit was related to technical difficulties or patient preference/necessity.     Each patient to whom I provide medical services by telemedicine is:  (1) informed of the relationship between the physician and patient and the respective role of any other health care provider with respect to management of the patient; and (2) notified that they may decline to receive medical services by telemedicine and may withdraw from such care at any time. Patient verbally consented to receive this service via voice-only telephone call.       HPI: 48m reports upper abdominal bulge for about 10 years, just to left of midline. Had lap epigastric and ventral hernia repair by dr al in 2011. Hernias were 5cm apart. Type of mesh not mentioned but it was a single 12cm by 7cm and secured with goretex suture and vicryl tacks. About a year after this he noted recurrent bulge up and to the left. Denies pain, NV. No real change in years.  CMP normal  BMI 28  Hga1c 5.2     Assessment and plan:  Has recurrent ventral hernia, minimally symptomatic as of now. Prefers to obs but wanted to know what might be involved with getting it fixed. Busy with work.  Would get CT scan prior to any surgery.   Discussed obs vs repair. He will think aobut it and let us know if he decides to proceed with getting it repaired                        This service was not originating from a related E/M service provided within the previous 7 days nor will  to an E/M service or procedure within the next 24 hours or my soonest available appointment.  Prevailing standard of care was able to be met in this audio-only visit.

## 2024-05-24 ENCOUNTER — PATIENT MESSAGE (OUTPATIENT)
Dept: INTERNAL MEDICINE | Facility: CLINIC | Age: 49
End: 2024-05-24
Payer: COMMERCIAL

## 2024-05-24 DIAGNOSIS — T63.444A BEE STING REACTION, UNDETERMINED INTENT, INITIAL ENCOUNTER: Primary | ICD-10-CM

## 2024-05-24 RX ORDER — EPINEPHRINE 0.3 MG/.3ML
1 INJECTION SUBCUTANEOUS ONCE
Qty: 0.3 ML | Refills: 0 | Status: SHIPPED | OUTPATIENT
Start: 2024-05-24 | End: 2024-05-24

## 2024-05-24 NOTE — TELEPHONE ENCOUNTER
Patient reports he became a  and he currently has 3 hives that he manages.    He is requesting EpiPen to prevent possible anaphylaxis should he get stings by bees.    I am ordering 1 EpiPen to use p.r.n. in case of allergic/anaphylactic reaction for bee stings

## 2024-07-05 ENCOUNTER — OFFICE VISIT (OUTPATIENT)
Dept: URGENT CARE | Facility: CLINIC | Age: 49
End: 2024-07-05
Payer: COMMERCIAL

## 2024-07-05 VITALS
WEIGHT: 193.56 LBS | OXYGEN SATURATION: 97 % | HEIGHT: 69 IN | DIASTOLIC BLOOD PRESSURE: 73 MMHG | RESPIRATION RATE: 17 BRPM | TEMPERATURE: 98 F | BODY MASS INDEX: 28.67 KG/M2 | SYSTOLIC BLOOD PRESSURE: 123 MMHG | HEART RATE: 56 BPM

## 2024-07-05 DIAGNOSIS — J32.9 RHINOSINUSITIS: Primary | ICD-10-CM

## 2024-07-05 RX ORDER — MONTELUKAST SODIUM 10 MG/1
10 TABLET ORAL NIGHTLY
Qty: 30 TABLET | Refills: 0 | Status: SHIPPED | OUTPATIENT
Start: 2024-07-05 | End: 2024-08-04

## 2024-07-05 RX ORDER — METHYLPREDNISOLONE 4 MG/1
TABLET ORAL
Qty: 21 EACH | Refills: 0 | Status: SHIPPED | OUTPATIENT
Start: 2024-07-05 | End: 2024-07-26

## 2024-07-05 NOTE — PROGRESS NOTES
"Subjective:      Patient ID: James Royden Peabody IV is a 48 y.o. male.    Vitals:  height is 5' 9" (1.753 m) and weight is 87.8 kg (193 lb 9 oz). His temporal temperature is 97.8 °F (36.6 °C). His blood pressure is 123/73 and his pulse is 56 (abnormal). His respiration is 17 and oxygen saturation is 97%.     Chief Complaint: Cough (With congestion associted. )    Patient is a 48 y.o. male with the compliant of a cough and congestion that presented about 2-3 weeks ago, he reports with using several otc medications to help combat his symptoms and reports with no relief.     Cough  This is a new problem. The current episode started 1 to 4 weeks ago. The problem has been waxing and waning. The cough is Productive of sputum. Associated symptoms include ear congestion, nasal congestion, postnasal drip and wheezing. Pertinent negatives include no chest pain, chills, ear pain, fever, headaches, heartburn, hemoptysis, myalgias, rash, rhinorrhea, sore throat, shortness of breath, sweats or weight loss. Nothing aggravates the symptoms. He has tried OTC cough suppressant for the symptoms. The treatment provided no relief. His past medical history is significant for environmental allergies. There is no history of asthma, bronchiectasis, bronchitis, COPD, emphysema or pneumonia.       Constitution: Negative for chills and fever.   HENT:  Positive for postnasal drip. Negative for ear pain and sore throat.    Cardiovascular:  Negative for chest pain.   Respiratory:  Positive for cough and wheezing. Negative for bloody sputum and shortness of breath.    Gastrointestinal:  Negative for heartburn.   Musculoskeletal:  Negative for muscle ache.   Skin:  Negative for rash.   Allergic/Immunologic: Positive for environmental allergies.   Neurological:  Negative for headaches.      Objective:     Physical Exam   Constitutional: No distress.   HENT:   Head: Normocephalic.   Ears:   Right Ear: Tympanic membrane, external ear and ear canal " normal.   Left Ear: Tympanic membrane, external ear and ear canal normal.   Nose: Nose normal.   Neck: Neck supple.   Pulmonary/Chest: Effort normal and breath sounds normal.   Abdominal: Normal appearance.   Neurological: He is alert.   Skin: Skin is warm and dry.       Assessment:     1. Rhinosinusitis        Plan:       Rhinosinusitis  -     methylPREDNISolone (MEDROL DOSEPACK) 4 mg tablet; use as directed  Dispense: 21 each; Refill: 0  -     montelukast (SINGULAIR) 10 mg tablet; Take 1 tablet (10 mg total) by mouth every evening.  Dispense: 30 tablet; Refill: 0      Patient Instructions   Try to thin the mucus.  Drink lots of liquids to stay hydrated.  Use a cool mist humidifier to avoid dry air.  Use saline nose drops or a saline nose rinse to relieve stuffiness.  Wash your hands often. This will help keep others healthy.  Do not smoke or be in smoke-filled places. Avoid things that may cause breathing problems like fumes, pollution, dust, and other common allergens and irritants.  You may want to take medicine like ibuprofen, naproxen, or acetaminophen to help with pain.

## 2024-07-09 ENCOUNTER — LAB VISIT (OUTPATIENT)
Dept: LAB | Facility: HOSPITAL | Age: 49
End: 2024-07-09
Payer: COMMERCIAL

## 2024-07-09 DIAGNOSIS — Z00.00 ANNUAL PHYSICAL EXAM: ICD-10-CM

## 2024-07-09 LAB
ALBUMIN SERPL BCP-MCNC: 3.7 G/DL (ref 3.5–5.2)
ALP SERPL-CCNC: 65 U/L (ref 55–135)
ALT SERPL W/O P-5'-P-CCNC: 24 U/L (ref 10–44)
ANION GAP SERPL CALC-SCNC: 7 MMOL/L (ref 8–16)
AST SERPL-CCNC: 21 U/L (ref 10–40)
BASOPHILS # BLD AUTO: 0.04 K/UL (ref 0–0.2)
BASOPHILS NFR BLD: 0.4 % (ref 0–1.9)
BILIRUB SERPL-MCNC: 0.6 MG/DL (ref 0.1–1)
BUN SERPL-MCNC: 14 MG/DL (ref 6–20)
CALCIUM SERPL-MCNC: 9.2 MG/DL (ref 8.7–10.5)
CHLORIDE SERPL-SCNC: 109 MMOL/L (ref 95–110)
CHOLEST SERPL-MCNC: 255 MG/DL (ref 120–199)
CHOLEST/HDLC SERPL: 5 {RATIO} (ref 2–5)
CO2 SERPL-SCNC: 25 MMOL/L (ref 23–29)
CREAT SERPL-MCNC: 0.8 MG/DL (ref 0.5–1.4)
DIFFERENTIAL METHOD BLD: ABNORMAL
EOSINOPHIL # BLD AUTO: 0.1 K/UL (ref 0–0.5)
EOSINOPHIL NFR BLD: 1.1 % (ref 0–8)
ERYTHROCYTE [DISTWIDTH] IN BLOOD BY AUTOMATED COUNT: 13.3 % (ref 11.5–14.5)
EST. GFR  (NO RACE VARIABLE): >60 ML/MIN/1.73 M^2
ESTIMATED AVG GLUCOSE: 108 MG/DL (ref 68–131)
GLUCOSE SERPL-MCNC: 85 MG/DL (ref 70–110)
HBA1C MFR BLD: 5.4 % (ref 4–5.6)
HCT VFR BLD AUTO: 43.8 % (ref 40–54)
HDLC SERPL-MCNC: 51 MG/DL (ref 40–75)
HDLC SERPL: 20 % (ref 20–50)
HGB BLD-MCNC: 14.1 G/DL (ref 14–18)
IMM GRANULOCYTES # BLD AUTO: 0.04 K/UL (ref 0–0.04)
IMM GRANULOCYTES NFR BLD AUTO: 0.4 % (ref 0–0.5)
LDLC SERPL CALC-MCNC: 182.8 MG/DL (ref 63–159)
LYMPHOCYTES # BLD AUTO: 3.7 K/UL (ref 1–4.8)
LYMPHOCYTES NFR BLD: 35.2 % (ref 18–48)
MCH RBC QN AUTO: 32.3 PG (ref 27–31)
MCHC RBC AUTO-ENTMCNC: 32.2 G/DL (ref 32–36)
MCV RBC AUTO: 101 FL (ref 82–98)
MONOCYTES # BLD AUTO: 0.8 K/UL (ref 0.3–1)
MONOCYTES NFR BLD: 7.4 % (ref 4–15)
NEUTROPHILS # BLD AUTO: 5.8 K/UL (ref 1.8–7.7)
NEUTROPHILS NFR BLD: 55.5 % (ref 38–73)
NONHDLC SERPL-MCNC: 204 MG/DL
NRBC BLD-RTO: 0 /100 WBC
PLATELET # BLD AUTO: 343 K/UL (ref 150–450)
PMV BLD AUTO: 10.2 FL (ref 9.2–12.9)
POTASSIUM SERPL-SCNC: 4.2 MMOL/L (ref 3.5–5.1)
PROT SERPL-MCNC: 6.6 G/DL (ref 6–8.4)
RBC # BLD AUTO: 4.36 M/UL (ref 4.6–6.2)
SODIUM SERPL-SCNC: 141 MMOL/L (ref 136–145)
TRIGL SERPL-MCNC: 106 MG/DL (ref 30–150)
WBC # BLD AUTO: 10.53 K/UL (ref 3.9–12.7)

## 2024-07-09 PROCEDURE — 80061 LIPID PANEL: CPT | Performed by: STUDENT IN AN ORGANIZED HEALTH CARE EDUCATION/TRAINING PROGRAM

## 2024-07-09 PROCEDURE — 85025 COMPLETE CBC W/AUTO DIFF WBC: CPT | Performed by: STUDENT IN AN ORGANIZED HEALTH CARE EDUCATION/TRAINING PROGRAM

## 2024-07-09 PROCEDURE — 83036 HEMOGLOBIN GLYCOSYLATED A1C: CPT | Performed by: STUDENT IN AN ORGANIZED HEALTH CARE EDUCATION/TRAINING PROGRAM

## 2024-07-09 PROCEDURE — 80053 COMPREHEN METABOLIC PANEL: CPT | Performed by: STUDENT IN AN ORGANIZED HEALTH CARE EDUCATION/TRAINING PROGRAM

## 2024-07-09 PROCEDURE — 36415 COLL VENOUS BLD VENIPUNCTURE: CPT | Mod: PN | Performed by: STUDENT IN AN ORGANIZED HEALTH CARE EDUCATION/TRAINING PROGRAM

## 2024-07-10 ENCOUNTER — OFFICE VISIT (OUTPATIENT)
Dept: INTERNAL MEDICINE | Facility: CLINIC | Age: 49
End: 2024-07-10
Payer: COMMERCIAL

## 2024-07-10 VITALS
BODY MASS INDEX: 26.74 KG/M2 | HEART RATE: 60 BPM | WEIGHT: 180.56 LBS | SYSTOLIC BLOOD PRESSURE: 124 MMHG | DIASTOLIC BLOOD PRESSURE: 88 MMHG | HEIGHT: 69 IN

## 2024-07-10 DIAGNOSIS — M54.12 CERVICAL RADICULOPATHY: ICD-10-CM

## 2024-07-10 DIAGNOSIS — F17.200 TOBACCO USE DISORDER: ICD-10-CM

## 2024-07-10 DIAGNOSIS — Z00.00 HEALTHCARE MAINTENANCE: ICD-10-CM

## 2024-07-10 DIAGNOSIS — F10.11 H/O ALCOHOL ABUSE: ICD-10-CM

## 2024-07-10 DIAGNOSIS — K21.9 GASTROESOPHAGEAL REFLUX DISEASE, UNSPECIFIED WHETHER ESOPHAGITIS PRESENT: ICD-10-CM

## 2024-07-10 DIAGNOSIS — Z00.00 ANNUAL PHYSICAL EXAM: Primary | ICD-10-CM

## 2024-07-10 DIAGNOSIS — J06.9 UPPER RESPIRATORY TRACT INFECTION, UNSPECIFIED TYPE: ICD-10-CM

## 2024-07-10 DIAGNOSIS — E78.2 MIXED HYPERLIPIDEMIA: ICD-10-CM

## 2024-07-10 PROCEDURE — 3008F BODY MASS INDEX DOCD: CPT | Mod: CPTII,S$GLB,, | Performed by: STUDENT IN AN ORGANIZED HEALTH CARE EDUCATION/TRAINING PROGRAM

## 2024-07-10 PROCEDURE — 99396 PREV VISIT EST AGE 40-64: CPT | Mod: S$GLB,,, | Performed by: STUDENT IN AN ORGANIZED HEALTH CARE EDUCATION/TRAINING PROGRAM

## 2024-07-10 PROCEDURE — 3079F DIAST BP 80-89 MM HG: CPT | Mod: CPTII,S$GLB,, | Performed by: STUDENT IN AN ORGANIZED HEALTH CARE EDUCATION/TRAINING PROGRAM

## 2024-07-10 PROCEDURE — 3074F SYST BP LT 130 MM HG: CPT | Mod: CPTII,S$GLB,, | Performed by: STUDENT IN AN ORGANIZED HEALTH CARE EDUCATION/TRAINING PROGRAM

## 2024-07-10 PROCEDURE — 99999 PR PBB SHADOW E&M-EST. PATIENT-LVL IV: CPT | Mod: PBBFAC,,, | Performed by: STUDENT IN AN ORGANIZED HEALTH CARE EDUCATION/TRAINING PROGRAM

## 2024-07-10 PROCEDURE — 3044F HG A1C LEVEL LT 7.0%: CPT | Mod: CPTII,S$GLB,, | Performed by: STUDENT IN AN ORGANIZED HEALTH CARE EDUCATION/TRAINING PROGRAM

## 2024-07-10 PROCEDURE — 1159F MED LIST DOCD IN RCRD: CPT | Mod: CPTII,S$GLB,, | Performed by: STUDENT IN AN ORGANIZED HEALTH CARE EDUCATION/TRAINING PROGRAM

## 2024-07-10 PROCEDURE — 1160F RVW MEDS BY RX/DR IN RCRD: CPT | Mod: CPTII,S$GLB,, | Performed by: STUDENT IN AN ORGANIZED HEALTH CARE EDUCATION/TRAINING PROGRAM

## 2024-07-10 RX ORDER — SIMVASTATIN 80 MG/1
80 TABLET, FILM COATED ORAL NIGHTLY
Qty: 90 TABLET | Refills: 3 | Status: SHIPPED | OUTPATIENT
Start: 2024-07-10 | End: 2025-07-10

## 2024-07-10 NOTE — ASSESSMENT & PLAN NOTE
Stable.    Patient has managed pain with over-the-counter medications, physical therapy, physical activity.  Continue

## 2024-07-10 NOTE — ASSESSMENT & PLAN NOTE
Significant decrease month of alcohol consumed during the week.    I have encouraged for patient to continue to avoid alcohol consumption

## 2024-07-10 NOTE — ASSESSMENT & PLAN NOTE
Unchanged.  Patient continues to 1 pack a day.    Patient has smoked for 36 years.    Has been more than 20 minutes counseling patient about smoking cessation.    Patient still not ready with smoking but he is thinking about it.    He does not want to take medications at this point for smoking cessation.    I have provided a handout about steps to stop smoking

## 2024-07-10 NOTE — ASSESSMENT & PLAN NOTE
Lab Results   Component Value Date    CHOL 255 (H) 07/09/2024    CHOL 267 (H) 07/10/2023    CHOL 228 (H) 05/14/2021     Lab Results   Component Value Date    HDL 51 07/09/2024    HDL 61 07/10/2023    HDL 54 05/14/2021     Lab Results   Component Value Date    LDLCALC 182.8 (H) 07/09/2024    LDLCALC 187.8 (H) 07/10/2023    LDLCALC 140.6 05/14/2021     Lab Results   Component Value Date    TRIG 106 07/09/2024    TRIG 91 07/10/2023    TRIG 167 (H) 05/14/2021       Lab Results   Component Value Date    CHOLHDL 20.0 07/09/2024    CHOLHDL 22.8 07/10/2023    CHOLHDL 23.7 05/14/2021     Unchanged  Likely 2/2 poor medication adherence  I have stressed importance of taking his medications as prescribed.    I am going to increase the dose of simvastatin.  Rx sent to the pharmacy

## 2024-07-10 NOTE — ASSESSMENT & PLAN NOTE
One week history of upper respiratory symptoms, status post oral steroids.  I have recommended to continue symptomatic treatment for now.    Return to clinic or call us back if cough persists or new onset of fever

## 2024-07-10 NOTE — PROGRESS NOTES
"Subjective:       Patient ID: James Royden Peabody IV is a 48 y.o. male.    Chief Complaint: Annual Exam    HPI    James Royden Peabody IV is a 48 y.o. male , English speaking, with a history of:  HLD  Alcohol abuse  Chronic active tobacco use  Cervical foraminal stenosis and BUE radiculopathy  BL carpal tunnel Sx  GERD  H/o esophageal stricture s/p dilation EGD in 2019  H/o colon polyps  Chronic right shoulder pain  H/o HSV2 infection      [Local Patient]  Originally from [facility]  Lives in: [place] [address]       Patient comes to the clinic for for his annual physical exam.      Patient overall feels well, he has been asymptomatic, he denies cardiovascular symptoms such as chest pain, palpitations, shortness for breath.      During the past week he had an upper respiratory infection, he went to an urgent care and he was prescribed a short course of steroids.  Last dose today.    He refers please symptoms have resolved.  Currently he only experiences mild rhinorrhea.    He is very physically active, he is doing Pilates training twice daily, and he has been working on strengthening his core so he can ride his motorcycle long distances.    He has completed his follow-up appointments with Orthopedics for his various ortho pains (shoulder, cervical spine)    He has significantly decreased the alcohol consumption, he feels better, he feels he is working for improving his health.  He says his body has a "project"    He continues to smoke 1 pack of cigarettes a day.  He has not ready to start smoking cessation at this time.    Patient denies unintentional weight loss, chest pain, shortness for breath, cough.    No other complaints or concerns      Since last seen by me:      7/10/23 AWV - establish care      Changes in health or medications: No    Specialists visits and recommendations:        H/o ER visits:   NO    H/o Hospitalizations:  NO    H/o falls: None     Life events / lifestyle:   Nothing new      Most recent " laboratories reviewed:    Recent Labs   Lab 07/10/23  0827 07/09/24  0648   WBC 6.34 10.53   Hemoglobin 14.9 14.1   Hematocrit 44.2 43.8    H 101 H   Platelets 242 343       Recent Labs   Lab 07/10/23  0827 07/09/24  0648   Glucose 92 85   Sodium 139 141   Potassium 4.5 4.2   BUN 14 14   Creatinine 0.8 0.8   Total Bilirubin 0.5 0.6   AST 21 21   ALT 19 24       Recent Labs   Lab 07/10/23  0827 07/09/24  0648   Hemoglobin A1C 5.2 5.4       Recent Labs   Lab 07/10/23  0827 07/09/24  0648   Cholesterol 267 H 255 H   Triglycerides 91 106   HDL 61 51   LDL Cholesterol 187.8 H 182.8 H   Non-HDL Cholesterol 206 204       Recent Labs   Lab 07/10/23  0827   TSH 1.216   PSA, Screen 0.95                 Current medications:    Current Outpatient Medications:     acetaminophen (TYLENOL) 500 MG tablet, Take 2 tablets (1,000 mg total) by mouth every 8 (eight) hours., Disp: 60 tablet, Rfl: 0    gabapentin (NEURONTIN) 300 MG capsule, Take 1 capsule (300 mg total) by mouth 3 (three) times daily. (Patient taking differently: Take 300 mg by mouth 3 (three) times daily. AS NEEDED), Disp: 60 capsule, Rfl: 1    ibuprofen (ADVIL,MOTRIN) 600 MG tablet, Take 1 tablet (600 mg total) by mouth every 6 (six) hours as needed for Pain. (Patient taking differently: Take 600 mg by mouth every 6 (six) hours as needed for Pain. AS NEEDED), Disp: 24 tablet, Rfl: 0    meloxicam (MOBIC) 15 MG tablet, Take 1 tablet (15 mg total) by mouth once daily. (Patient taking differently: Take 15 mg by mouth once daily. AS NEEDED), Disp: 60 tablet, Rfl: 1    montelukast (SINGULAIR) 10 mg tablet, Take 1 tablet (10 mg total) by mouth every evening., Disp: 30 tablet, Rfl: 0    omeprazole (PRILOSEC) 40 MG capsule, Take 1 capsule (40 mg total) by mouth once daily., Disp: 90 capsule, Rfl: 3    EPINEPHrine (EPIPEN) 0.3 mg/0.3 mL AtIn, Inject 0.3 mLs (0.3 mg total) into the muscle once. for 1 dose, Disp: 0.3 mL, Rfl: 0    simvastatin (ZOCOR) 80 MG tablet, Take 1  "tablet (80 mg total) by mouth every evening., Disp: 90 tablet, Rfl: 3    valACYclovir (VALTREX) 1000 MG tablet, Take 1 tablet (1,000 mg total) by mouth every 12 (twelve) hours. for 7 days, Disp: 14 tablet, Rfl: 0      Healthcare Maintenance:  Colon cancer screening:   Last Colonoscopy completed on 1/6/2022   EGD 2022  Vaccinations: Pneumonia (NO), Zoster (no), Tetanus 2016  COVID vaccination: completed x 3  Depression screening: PHQ2 score = 0     Annual visit approx. Month: July ROS  11-point review of systems done. Negative except for detailed in the HPI.  Redness of right great toe        Objective:      /88   Pulse 60   Ht 5' 9" (1.753 m)   Wt 81.9 kg (180 lb 8.9 oz)   BMI 26.66 kg/m²     Physical Exam   Physical Exam  Vitals and nursing note reviewed.   Constitutional:       Appearance: Normal appearance.   HENT:      Head: Normocephalic and atraumatic.      Right Ear: Tympanic membrane normal.      Left Ear: Tympanic membrane normal.      Nose: Nose normal.      Mouth/Throat:      Mouth: Mucous membranes are moist.      Pharynx: Oropharynx is clear.   Eyes:      Extraocular Movements: Extraocular movements intact.      Conjunctiva/sclera: Conjunctivae normal.      Pupils: Pupils are equal, round, and reactive to light.   Cardiovascular:      Rate and Rhythm: Normal rate and regular rhythm.      Pulses: Normal pulses.      Heart sounds: Normal heart sounds.   Pulmonary:      Effort: Pulmonary effort is normal.      Breath sounds: Normal breath sounds.   Abdominal:      General: Bowel sounds are normal. There is no distension.      Palpations: Abdomen is soft.      Tenderness: There is no abdominal tenderness.   Musculoskeletal:         General: Normal range of motion.      Cervical back: Normal range of motion and neck supple.   Skin:     General: Skin is warm.   Small callus noted over the medial side of the right great toe  Neurological:      General: No focal deficit present.      Mental Status: " Alert and oriented to person, place, and time. Mental status is at baseline.   Psychiatric:         Mood and Affect: Mood normal.           Assessment:       1. Annual physical exam  Assessment & Plan:  Patient is in overall good general health.  Stable medical conditions listed on the PMH.  Labs reviewed and patient notified.      Orders:  -     CBC Auto Differential; Future; Expected date: 07/10/2025  -     Comprehensive Metabolic Panel; Future; Expected date: 07/10/2025  -     Hemoglobin A1C; Future; Expected date: 07/10/2025  -     Lipid Panel; Future; Expected date: 07/10/2025  -     PSA, Screening; Future; Expected date: 07/10/2025  -     Urinalysis; Future; Expected date: 07/10/2025    2. Upper respiratory tract infection, unspecified type  Assessment & Plan:  One week history of upper respiratory symptoms, status post oral steroids.  I have recommended to continue symptomatic treatment for now.    Return to clinic or call us back if cough persists or new onset of fever      3. Mixed hyperlipidemia  Assessment & Plan:  Lab Results   Component Value Date    CHOL 255 (H) 07/09/2024    CHOL 267 (H) 07/10/2023    CHOL 228 (H) 05/14/2021     Lab Results   Component Value Date    HDL 51 07/09/2024    HDL 61 07/10/2023    HDL 54 05/14/2021     Lab Results   Component Value Date    LDLCALC 182.8 (H) 07/09/2024    LDLCALC 187.8 (H) 07/10/2023    LDLCALC 140.6 05/14/2021     Lab Results   Component Value Date    TRIG 106 07/09/2024    TRIG 91 07/10/2023    TRIG 167 (H) 05/14/2021       Lab Results   Component Value Date    CHOLHDL 20.0 07/09/2024    CHOLHDL 22.8 07/10/2023    CHOLHDL 23.7 05/14/2021     Unchanged  Likely 2/2 poor medication adherence  I have stressed importance of taking his medications as prescribed.    I am going to increase the dose of simvastatin.  Rx sent to the pharmacy    Orders:  -     simvastatin (ZOCOR) 80 MG tablet; Take 1 tablet (80 mg total) by mouth every evening.  Dispense: 90 tablet;  Refill: 3    4. Cervical radiculopathy  Assessment & Plan:  Stable.    Patient has managed pain with over-the-counter medications, physical therapy, physical activity.  Continue      5. H/O alcohol abuse  Assessment & Plan:  Significant decrease month of alcohol consumed during the week.    I have encouraged for patient to continue to avoid alcohol consumption      6. Gastroesophageal reflux disease, unspecified whether esophagitis present  Assessment & Plan:  Improved, asymptomatic, on omeprazole p.r.n.      7. Tobacco use disorder  Assessment & Plan:  Unchanged.  Patient continues to 1 pack a day.    Patient has smoked for 36 years.    Has been more than 20 minutes counseling patient about smoking cessation.    Patient still not ready with smoking but he is thinking about it.    He does not want to take medications at this point for smoking cessation.    I have provided a handout about steps to stop smoking      8. Healthcare maintenance  Assessment & Plan:  Health care maintenance and core gaps reviewed and assessed with patient.  Vaccinations recommended:        Tetanus - 2016       Shingles - no       Influenza - no       Pneumonia - no  Colon cancer screening:   Colonoscopy: 2022  Lifestyle recommendations given.  Physical activity recommended.    Legend: Ordered (O), Declined (D), Current (C)           Plan:       Resume simvastatin  Extensive counseling provided for alcohol cessation and tobacco cessation.        There are no Patient Instructions on file for this visit.     Problem list updated, medications reconciled, education provided  Lifestyle recommendations given  AVS printed, explained, and given to the patient.  RTC in :  1 year for annual wellness visit, labs ordered              ANDRES ALDRICH MD, MPH  Internal Medicine  [facility]  [facility]

## 2024-07-10 NOTE — ASSESSMENT & PLAN NOTE
Health care maintenance and core gaps reviewed and assessed with patient.  Vaccinations recommended:        Tetanus - 2016       Shingles - no       Influenza - no       Pneumonia - no  Colon cancer screening:   Colonoscopy: 2022  Lifestyle recommendations given.  Physical activity recommended.    Legend: Ordered (O), Declined (D), Current (C)

## 2024-07-11 ENCOUNTER — HOSPITAL ENCOUNTER (OUTPATIENT)
Dept: RADIOLOGY | Facility: HOSPITAL | Age: 49
Discharge: HOME | End: 2024-07-11
Attending: STUDENT IN AN ORGANIZED HEALTH CARE EDUCATION/TRAINING PROGRAM
Payer: COMMERCIAL

## 2024-07-11 DIAGNOSIS — F17.200 TOBACCO USE DISORDER: ICD-10-CM

## 2024-07-11 PROCEDURE — 71046 X-RAY EXAM CHEST 2 VIEWS: CPT | Mod: TC,PN

## 2024-07-11 PROCEDURE — 71046 X-RAY EXAM CHEST 2 VIEWS: CPT | Mod: 26,,, | Performed by: RADIOLOGY

## 2024-07-23 ENCOUNTER — OFFICE VISIT (OUTPATIENT)
Dept: URGENT CARE | Facility: CLINIC | Age: 49
End: 2024-07-23
Payer: COMMERCIAL

## 2024-07-23 VITALS
HEART RATE: 76 BPM | HEIGHT: 69 IN | TEMPERATURE: 99 F | WEIGHT: 180 LBS | SYSTOLIC BLOOD PRESSURE: 116 MMHG | OXYGEN SATURATION: 98 % | BODY MASS INDEX: 26.66 KG/M2 | DIASTOLIC BLOOD PRESSURE: 74 MMHG | RESPIRATION RATE: 18 BRPM

## 2024-07-23 DIAGNOSIS — U07.1 COVID: Primary | ICD-10-CM

## 2024-07-23 LAB
CTP QC/QA: YES
SARS-COV-2 AG RESP QL IA.RAPID: POSITIVE

## 2024-07-23 PROCEDURE — 87811 SARS-COV-2 COVID19 W/OPTIC: CPT | Mod: QW,S$GLB,, | Performed by: NURSE PRACTITIONER

## 2024-07-23 PROCEDURE — 99213 OFFICE O/P EST LOW 20 MIN: CPT | Mod: S$GLB,,, | Performed by: NURSE PRACTITIONER

## 2024-07-23 RX ORDER — NIRMATRELVIR AND RITONAVIR 300-100 MG
KIT ORAL
Qty: 30 TABLET | Refills: 0 | Status: SHIPPED | OUTPATIENT
Start: 2024-07-23

## 2024-07-23 NOTE — PROGRESS NOTES
"Subjective:      Patient ID: James Royden Peabody IV is a 48 y.o. male.    Vitals:  height is 5' 9" (1.753 m) and weight is 81.6 kg (180 lb). His oral temperature is 99.3 °F (37.4 °C). His blood pressure is 116/74 and his pulse is 76. His respiration is 18 and oxygen saturation is 98%.     Chief Complaint: Fatigue    Patient presents c.o. fatigue.Symps include body aches and cough.Symps began yesterday. Patient has taken Advil for his symptoms.    Fatigue  The current episode started yesterday. The problem occurs constantly. The problem has been unchanged. Associated symptoms include chills, congestion, coughing and fatigue. Pertinent negatives include no abdominal pain, anorexia, arthralgias, change in bowel habit, chest pain, diaphoresis, fever, headaches, joint swelling, myalgias, neck pain, numbness, rash, sore throat, urinary symptoms, visual change, vomiting or weakness. Nothing aggravates the symptoms. He has tried NSAIDs for the symptoms. The treatment provided mild relief.       Constitution: Positive for chills and fatigue. Negative for sweating and fever.   HENT:  Positive for congestion. Negative for sore throat.    Neck: Negative for neck pain.   Cardiovascular:  Negative for chest pain.   Respiratory:  Positive for cough.    Gastrointestinal:  Negative for abdominal pain and vomiting.   Musculoskeletal:  Negative for joint pain, joint swelling and muscle ache.   Skin:  Negative for rash.   Neurological:  Negative for headaches and numbness.      Objective:     Physical Exam   HENT:   Head: Normocephalic.   Nose: Nose normal.   Mouth/Throat: Mucous membranes are moist. Oropharynx is clear.   Cardiovascular: Normal rate and regular rhythm.   Pulmonary/Chest: Effort normal and breath sounds normal. No stridor. No respiratory distress. He has no wheezes. He has no rhonchi. He has no rales.   Abdominal: Normal appearance.   Neurological: He is alert.   Skin: Skin is warm and dry.   Psychiatric: His behavior " "is normal.       Assessment:     1. COVID        Plan:       COVID  -     SARS Coronavirus 2 Antigen, POCT Manual Read  -     nirmatrelvir-ritonavir (PAXLOVID) 300 mg (150 mg x 2)-100 mg copackaged tablets (EUA); Take 3 tablets by mouth 2 (two) times daily. Each dose contains 2 nirmatrelvir (pink tablets) and 1 ritonavir (white tablet). Take all 3 tablets together  Dispense: 30 tablet; Refill: 0      Results for orders placed or performed in visit on 07/23/24   SARS Coronavirus 2 Antigen, POCT Manual Read   Result Value Ref Range    SARS Coronavirus 2 Antigen Positive (A) Negative     Acceptable Yes      Patient Instructions   HOLD Zocor While Taking Paxlovid      I spoke with the patient and reviewed results.  Covid 19 counseling and education reviewed.  No questions.      - Rest at home.     - Drink plenty of fluids so you won't get dehydrated.      Avoid taking Decongestants such as Sudafed, pseudoephedrine, phenylephrine or meds that say "cold," "sinus" or "-D".      - Cough recommendations:   Warm tea with honey can help with cough. Honey is a natural cough suppressant.  - Dextromethorphan (DM) is a cough suppressant over the counter (ie. mucinex DM OR delsym).        - Congestion recommendations:    - Mucinex (guaifenesin) twice a day (or as directed) to help loosen mucous.       - Fever/Pain recommendations:  Alternate Tylenol or Ibuprofen as directed for fever/pain.   - Motrin/Ibuprofen every 6-8 hours for pain and inflammation. Do not take ibuprofen if you have a history of GI bleeding, kidney disease, or if you take blood thinners.    - Tylenol/acetaminophen every 6-8 hours for added pain relief.  Avoid tylenol if you have a history of liver disease.       -Sore throat recommendations: Warm fluids, warm salt water gargles, throat lozenges, tea, honey, soup, or drinking something cold or frozen.  Throat lozenges or sprays help reduce pain. Gargling with warm saltwater (1/4 teaspoon of salt in " 1/2 cup of warm water) or an OTC anesthetic gargle may be useful for irritation.      - Go to the ER if you develop new or worsening symptoms.      When to seek medical advice  Call your healthcare provider right away if any of these occur:  Fever that is poorly controlled with OTC fever reducing medication  New or worsening ear pain, sinus pain, or headache  Stiff neck  You can't swallow liquids or you can't open your mouth wide because of throat pain  Signs of dehydration. These include very dark urine or no urine, sunken eyes, and dizziness.  Trouble breathing or noisy breathing  Muffled voice  Rash

## 2024-07-23 NOTE — PATIENT INSTRUCTIONS
"HOLD Zocor While Taking Paxlovid      I spoke with the patient and reviewed results.  Covid 19 counseling and education reviewed.  No questions.      - Rest at home.     - Drink plenty of fluids so you won't get dehydrated.      Avoid taking Decongestants such as Sudafed, pseudoephedrine, phenylephrine or meds that say "cold," "sinus" or "-D".      - Cough recommendations:   Warm tea with honey can help with cough. Honey is a natural cough suppressant.  - Dextromethorphan (DM) is a cough suppressant over the counter (ie. mucinex DM OR delsym).        - Congestion recommendations:    - Mucinex (guaifenesin) twice a day (or as directed) to help loosen mucous.       - Fever/Pain recommendations:  Alternate Tylenol or Ibuprofen as directed for fever/pain.   - Motrin/Ibuprofen every 6-8 hours for pain and inflammation. Do not take ibuprofen if you have a history of GI bleeding, kidney disease, or if you take blood thinners.    - Tylenol/acetaminophen every 6-8 hours for added pain relief.  Avoid tylenol if you have a history of liver disease.       -Sore throat recommendations: Warm fluids, warm salt water gargles, throat lozenges, tea, honey, soup, or drinking something cold or frozen.  Throat lozenges or sprays help reduce pain. Gargling with warm saltwater (1/4 teaspoon of salt in 1/2 cup of warm water) or an OTC anesthetic gargle may be useful for irritation.      - Go to the ER if you develop new or worsening symptoms.      When to seek medical advice  Call your healthcare provider right away if any of these occur:  Fever that is poorly controlled with OTC fever reducing medication  New or worsening ear pain, sinus pain, or headache  Stiff neck  You can't swallow liquids or you can't open your mouth wide because of throat pain  Signs of dehydration. These include very dark urine or no urine, sunken eyes, and dizziness.  Trouble breathing or noisy breathing  Muffled voice  Rash   "

## 2024-07-23 NOTE — LETTER
[address] [facility] [address]  Phone: [phone]  Fax: [phone]          Return to Work/School    Patient: James Royden Peabody IV  YOB: 1975   Date: 07/23/2024     To Whom It May Concern:     James Royden Peabody IV was in contact with/seen in my office on 07/23/2024. COVID-19 is present in our communities across the state. There is limited testing for COVID at this time, so not all patients can be tested. In this situation, your employee meets the following criteria:     James Royden Peabody IV has met the criteria for COVID-19 testing and has a POSITIVE result. He can return to work once they are asymptomatic for 24 hours without the use of fever reducing medications AND at least five days from the start of symptoms (or from the first positive result if they have no symptoms).      If you have any questions or concerns, or if I can be of further assistance, please do not hesitate to contact me.     Sincerely,    Celestine Luz NP

## 2024-07-30 DIAGNOSIS — M54.12 CERVICAL RADICULOPATHY: ICD-10-CM

## 2024-07-30 RX ORDER — MELOXICAM 15 MG/1
15 TABLET ORAL DAILY
Qty: 60 TABLET | Refills: 1 | Status: SHIPPED | OUTPATIENT
Start: 2024-07-30

## 2024-07-30 RX ORDER — OMEPRAZOLE 40 MG/1
40 CAPSULE, DELAYED RELEASE ORAL DAILY
Qty: 90 CAPSULE | Refills: 3 | Status: SHIPPED | OUTPATIENT
Start: 2024-07-30 | End: 2025-07-30

## 2024-08-17 ENCOUNTER — TELEPHONE (OUTPATIENT)
Dept: PHARMACY | Facility: CLINIC | Age: 49
End: 2024-08-17
Payer: COMMERCIAL

## 2024-08-17 NOTE — TELEPHONE ENCOUNTER
[facility]Population [facility] Chart Review & Patient Outreach Details For Medication Adherence Project    Reason for Outreach Encounter: 3rd Party payor non-compliance report ([employer], [employer], C, etc)  2.  Patient Outreach Method: Reviewed Patient Chart  3.   Medication in question: simvastatin 80 mg    LAST FILLED: 7/10/24 for 90 day supply  Hyperlipidemia Medications               simvastatin (ZOCOR) 80 MG tablet Take 1 tablet (80 mg total) by mouth every evening.               4.  Reviewed and or Updates Made To: Patient Chart  5. Outreach Outcomes and/or actions taken: Patient filled medication and is on track to be adherent

## 2024-10-14 PROBLEM — Z00.00 HEALTHCARE MAINTENANCE: Status: RESOLVED | Noted: 2024-07-10 | Resolved: 2024-10-14

## 2024-10-14 PROBLEM — Z00.00 ANNUAL PHYSICAL EXAM: Status: RESOLVED | Noted: 2024-07-10 | Resolved: 2024-10-14

## 2024-11-13 ENCOUNTER — TELEPHONE (OUTPATIENT)
Dept: PHARMACY | Facility: CLINIC | Age: 49
End: 2024-11-13
Payer: COMMERCIAL

## 2024-11-14 DIAGNOSIS — B00.9 HSV INFECTION: ICD-10-CM

## 2024-11-14 NOTE — TELEPHONE ENCOUNTER
[facility]Population [facility] Chart Review & Patient Outreach Details For Medication Adherence Project    Reason for Outreach Encounter: 3rd Party payor non-compliance report ([employer], [employer], C, etc)  2.  Patient Outreach Method: Reviewed Patient Chart  3.   Medication in question: simvastatin    LAST FILLED: 10/6/24 for 90 day supply  Hyperlipidemia Medications               simvastatin (ZOCOR) 80 MG tablet Take 1 tablet (80 mg total) by mouth every evening.               4.  Reviewed and or Updates Made To: Patient Chart  5. Outreach Outcomes and/or actions taken: Patient filled medication and is on track to be adherent

## 2024-11-15 RX ORDER — VALACYCLOVIR HYDROCHLORIDE 1 G/1
1000 TABLET, FILM COATED ORAL EVERY 12 HOURS
Qty: 14 TABLET | Refills: 0 | Status: SHIPPED | OUTPATIENT
Start: 2024-11-15 | End: 2024-11-22

## 2024-11-15 NOTE — TELEPHONE ENCOUNTER
Refill Routing Note   Medication(s) are not appropriate for processing by [facility] for the following reason(s):        Non-participating provider    [facility] action(s):  Route               Appointments  past 12m or future 3m with PCP    Date Provider   Last Visit   7/10/2024 Mary Kay Conroy MD   Next Visit   Visit date not found Mary Kay Conroy MD   ED visits in past 90 days: 0        Note composed:9:12 AM 11/15/2024

## 2025-01-16 ENCOUNTER — TELEPHONE (OUTPATIENT)
Dept: PHARMACY | Facility: CLINIC | Age: 50
End: 2025-01-16
Payer: COMMERCIAL

## 2025-01-16 NOTE — TELEPHONE ENCOUNTER
[facility] Chart Review & Patient Outreach Details For Medication Adherence Project    Reason for Outreach Encounter: 3rd Party payor non-compliance report ([employer], [employer], C, etc)  2.  Patient Outreach Method: Reviewed Patient Chart  3.   Medication in question: simvastatin   LAST FILLED: 1/6/25 for 90 day supply  Hyperlipidemia Medications               simvastatin (ZOCOR) 80 MG tablet Take 1 tablet (80 mg total) by mouth every evening.               4.  Reviewed and or Updates Made To: Patient Chart  5. Outreach Outcomes and/or actions taken: Patient filled medication and is on track to be adherent

## 2025-01-28 ENCOUNTER — OFFICE VISIT (OUTPATIENT)
Dept: SPORTS MEDICINE | Facility: CLINIC | Age: 50
End: 2025-01-28
Payer: COMMERCIAL

## 2025-01-28 VITALS
BODY MASS INDEX: 27.3 KG/M2 | HEIGHT: 69 IN | HEART RATE: 69 BPM | DIASTOLIC BLOOD PRESSURE: 76 MMHG | WEIGHT: 184.31 LBS | SYSTOLIC BLOOD PRESSURE: 125 MMHG

## 2025-01-28 DIAGNOSIS — M99.03 SOMATIC DYSFUNCTION OF LUMBAR REGION: ICD-10-CM

## 2025-01-28 DIAGNOSIS — M79.10 MYALGIA: ICD-10-CM

## 2025-01-28 DIAGNOSIS — M99.08 SOMATIC DYSFUNCTION OF RIB CAGE REGION: ICD-10-CM

## 2025-01-28 DIAGNOSIS — M25.552 BILATERAL HIP PAIN: ICD-10-CM

## 2025-01-28 DIAGNOSIS — M54.59 MECHANICAL LOW BACK PAIN: Primary | ICD-10-CM

## 2025-01-28 DIAGNOSIS — M99.05 SOMATIC DYSFUNCTION OF PELVIS REGION: ICD-10-CM

## 2025-01-28 DIAGNOSIS — M99.02 SOMATIC DYSFUNCTION OF THORACIC REGION: ICD-10-CM

## 2025-01-28 DIAGNOSIS — M21.70 LEG LENGTH DISCREPANCY: ICD-10-CM

## 2025-01-28 DIAGNOSIS — M99.07 SOMATIC DYSFUNCTION OF UPPER EXTREMITY: ICD-10-CM

## 2025-01-28 DIAGNOSIS — M25.551 BILATERAL HIP PAIN: ICD-10-CM

## 2025-01-28 DIAGNOSIS — M99.04 SOMATIC DYSFUNCTION OF SACRAL REGION: ICD-10-CM

## 2025-01-28 DIAGNOSIS — M99.06 SOMATIC DYSFUNCTION OF LOWER EXTREMITY: ICD-10-CM

## 2025-01-28 PROCEDURE — 3074F SYST BP LT 130 MM HG: CPT | Mod: CPTII,S$GLB,, | Performed by: ORTHOPAEDIC SURGERY

## 2025-01-28 PROCEDURE — 3078F DIAST BP <80 MM HG: CPT | Mod: CPTII,S$GLB,, | Performed by: ORTHOPAEDIC SURGERY

## 2025-01-28 PROCEDURE — 97110 THERAPEUTIC EXERCISES: CPT | Mod: S$GLB,,, | Performed by: ORTHOPAEDIC SURGERY

## 2025-01-28 PROCEDURE — 3008F BODY MASS INDEX DOCD: CPT | Mod: CPTII,S$GLB,, | Performed by: ORTHOPAEDIC SURGERY

## 2025-01-28 PROCEDURE — 99214 OFFICE O/P EST MOD 30 MIN: CPT | Mod: 25,S$GLB,, | Performed by: ORTHOPAEDIC SURGERY

## 2025-01-28 PROCEDURE — 1160F RVW MEDS BY RX/DR IN RCRD: CPT | Mod: CPTII,S$GLB,, | Performed by: ORTHOPAEDIC SURGERY

## 2025-01-28 PROCEDURE — 1159F MED LIST DOCD IN RCRD: CPT | Mod: CPTII,S$GLB,, | Performed by: ORTHOPAEDIC SURGERY

## 2025-01-28 PROCEDURE — 98928 OSTEOPATH MANJ 7-8 REGIONS: CPT | Mod: S$GLB,,, | Performed by: ORTHOPAEDIC SURGERY

## 2025-01-28 PROCEDURE — 99999 PR PBB SHADOW E&M-EST. PATIENT-LVL III: CPT | Mod: PBBFAC,,, | Performed by: ORTHOPAEDIC SURGERY

## 2025-01-28 NOTE — PROGRESS NOTES
CC: bilateral low back/hip pain    49 y.o. Male presents today for evaluation of his bilateral low back/hip pain. Pain is primarily in the buttocks bilaterally as a deep ache and radiates superiorly into the flanks and laterally into the lateral hips as a pulling tightness. He reports he has begun to notice this over the past 6 months as he as gotten further into piliates. Denies inciting event. He reports his overall MSK health is improving with piliates but that he cannot progress further due to this pain/tightness. Denies weakness/sensory changes, bowel/bladder incontinence, and saddle anesthesia. Looking for conservative measures.     How long: ~6 months   What makes it better: stretching  What makes it worse: manufacturing work   Does it radiate: superiorly into the flanks and laterally into the lateral hips as a pulling tightness.  Numbness/tingling down legs: No  Attempted treatments: None  Pain score: 5  Any mechanical symptoms: No  Feelings of instability: No  Problems with ADLs: No     Occupation: owns a [employer]      PAST MEDICAL HISTORY:   Past Medical History:   Diagnosis Date    Carpal tunnel syndrome     Cervical spine degeneration     Cubital tunnel syndrome, right     GERD (gastroesophageal reflux disease)     H/O alcohol abuse     Hyperlipidemia     Tobacco use disorder        PAST SURGICAL HISTORY:   Past Surgical History:   Procedure Laterality Date    COLONOSCOPY N/A 1/6/2022    Procedure: COLONOSCOPY;  Surgeon: Андрей Obrien MD;  Location: [facility];  Service: Endoscopy;  Laterality: N/A;    DECOMPRESSION, NERVE, ULNAR Right 9/15/2023    Procedure: DECOMPRESSION, NERVE, ULNAR - right CuTR and ECTR;  Surgeon: Johnny Maguire MD;  Location: [facility] OR;  Service: Orthopedics;  Laterality: Right;    ENDOSCOPIC CARPAL TUNNEL RELEASE Right 9/15/2023    Procedure: RELEASE, CARPAL TUNNEL, ENDOSCOPIC;  Surgeon: Johnny Maguire MD;  Location: [facility] OR;  Service: Orthopedics;  Laterality: Right;     EPIDURAL STEROID INJECTION N/A 2/3/2023    Procedure: INJECTION, STEROID, EPIDURAL, C7/T1 CONTRAST DIRECT REF;  Surgeon: Cuauhtemoc Lyman MD;  Location: [facility] [place] [facility];  Service: Pain Management;  Laterality: N/A;    ESOPHAGOGASTRODUODENOSCOPY N/A 10/8/2019    Procedure: EGD (ESOPHAGOGASTRODUODENOSCOPY);  Surgeon: Marito Pringle MD;  Location: [facility] ENDO (4TH FLR);  Service: Endoscopy;  Laterality: N/A;    ESOPHAGOGASTRODUODENOSCOPY N/A 1/6/2022    Procedure: EGD (ESOPHAGOGASTRODUODENOSCOPY);  Surgeon: Андрей Obrien MD;  Location: [facility] ENDO;  Service: Endoscopy;  Laterality: N/A;  fully vaccinated  instructions portal -ml  Rapid covid    HERNIA REPAIR         FAMILY HISTORY:   Family History   Problem Relation Name Age of Onset    COPD Mother      Alcohol abuse Mother      Nephrolithiasis Father      Celiac disease Neg Hx      Colon cancer Neg Hx      Colon polyps Neg Hx      Esophageal cancer Neg Hx      Liver cancer Neg Hx      Liver disease Neg Hx      Rectal cancer Neg Hx      Stomach cancer Neg Hx         SOCIAL HISTORY:   Social History     Socioeconomic History    Marital status:    Tobacco Use    Smoking status: Every Day     Current packs/day: 1.00     Average packs/day: 1 pack/day for 36.1 years (36.1 ttl pk-yrs)     Types: Cigarettes     Start date: 1989     Passive exposure: Current    Smokeless tobacco: Never   Substance and Sexual Activity    Alcohol use: Yes     Alcohol/week: 7.0 standard drinks of alcohol     Types: 7 Cans of beer per week    Drug use: Yes   Social History Narrative     and repair. M x 9, 3 kids     Social Drivers of Health     Financial Resource Strain: Low Risk  (1/31/2024)    Overall Financial Resource Strain (CARDIA)     Difficulty of Paying Living Expenses: Not hard at all   Food Insecurity: No Food Insecurity (1/31/2024)    Hunger Vital Sign     Worried About Running Out of Food in the Last Year: Never true     Ran Out of Food in the Last Year: Never true    Transportation Needs: No Transportation Needs (1/31/2024)    PRAPARE - Transportation     Lack of Transportation (Medical): No     Lack of Transportation (Non-Medical): No   Physical Activity: Sufficiently Active (1/31/2024)    Exercise Vital Sign     Days of Exercise per Week: 3 days     Minutes of Exercise per Session: 50 min   Stress: Stress Concern Present (1/31/2024)    [facility] - Occupational Stress Questionnaire     Feeling of Stress : To some extent   Housing Stability: Low Risk  (1/31/2024)    Housing Stability Vital Sign     Unable to Pay for Housing in the Last Year: No     Number of Places Lived in the Last Year: 1     Unstable Housing in the Last Year: No       MEDICATIONS:     Current Outpatient Medications:     acetaminophen (TYLENOL) 500 MG tablet, Take 2 tablets (1,000 mg total) by mouth every 8 (eight) hours., Disp: 60 tablet, Rfl: 0    gabapentin (NEURONTIN) 300 MG capsule, Take 1 capsule (300 mg total) by mouth 3 (three) times daily., Disp: 60 capsule, Rfl: 1    ibuprofen (ADVIL,MOTRIN) 600 MG tablet, Take 1 tablet (600 mg total) by mouth every 6 (six) hours as needed for Pain., Disp: 24 tablet, Rfl: 0    meloxicam (MOBIC) 15 MG tablet, Take 1 tablet (15 mg total) by mouth once daily., Disp: 60 tablet, Rfl: 1    nirmatrelvir-ritonavir (PAXLOVID) 300 mg (150 mg x 2)-100 mg copackaged tablets (EUA), Take 3 tablets by mouth 2 (two) times daily. Each dose contains 2 nirmatrelvir (pink tablets) and 1 ritonavir (white tablet). Take all 3 tablets together, Disp: 30 tablet, Rfl: 0    omeprazole (PRILOSEC) 40 MG capsule, Take 1 capsule (40 mg total) by mouth once daily., Disp: 90 capsule, Rfl: 3    simvastatin (ZOCOR) 80 MG tablet, Take 1 tablet (80 mg total) by mouth every evening., Disp: 90 tablet, Rfl: 3    EPINEPHrine (EPIPEN) 0.3 mg/0.3 mL AtIn, Inject 0.3 mLs (0.3 mg total) into the muscle once. for 1 dose, Disp: 0.3 mL, Rfl: 0    valACYclovir (VALTREX) 1000 MG  "tablet, Take 1 tablet (1,000 mg total) by mouth every 12 (twelve) hours. for 7 days, Disp: 14 tablet, Rfl: 0    ALLERGIES:   Review of patient's allergies indicates:  No Known Allergies     PHYSICAL EXAMINATION:  /76 (Patient Position: Sitting)   Pulse 69   Ht 5' 9" (1.753 m)   Wt 83.6 kg (184 lb 4.9 oz)   BMI 27.22 kg/m²   Vitals signs and nursing note have been reviewed.  General: In no acute distress, well developed, well nourished, no diaphoresis  Eyes: EOM full and smooth, no eye redness or discharge  HENT: normocephalic and atraumatic, neck supple, trachea midline, no nasal discharge, no external ear redness or discharge  Cardiovascular: no LE edema  Lungs: respirations non-labored, no conversational dyspnea   Abd: non-distended, no rigidity  MSK: no amputation or deformity, no swelling of extremities  Neuro: AAOx3, CN2-12 grossly intact  Skin: No rashes, warm and dry  Psychiatric: cooperative, pleasant, mood and affect appropriate for age    Lumbar Spine: bilateral lumbar region    Observation:    Mid-thoracic scoliosis.    No obvious pelvic obliquity while standing.    No edema, erythema, or ecchymosis noted in lumbosacral region.    No midline skin abnormalities.    No atrophy of lower limb musculature.    Tenderness:   No tenderness throughout the lumbar spine, iliolumbar region, posterior pelvis.  + tenderness over the lower sacrum, piriformis, and greater/lesser trochanters.  No bony deformities or step-offs palpated.     Range of Motion:  Active flexion to 60°.   Active extension to 25°.   Active rotation to 30° on left and 30° on right.    Active sidebending to 25° on left and 25° on right.  Passive hip flexion to 135° on left and 135° on right.    Passive hip internal rotation to 45° on left and 45° on right.    Passive hip external rotation to 45° on left and 45° on right.    All ROM testing is without pain.    Strength Testing: (*pain)  Hip flexion - 5/5 on left and 5/5 on right  Hip " extension - 5/5 on left and 5/5 on right  Knee flexion - 5/5 on left and 5/5 on right  Knee extension - 5/5 on left and 5/5 on right  Dorsiflexion - 5/5 on left and 5/5 on right  Plantarflexion - 5/5 on left and 5/5 on right  Great toe extension - 5/5 on left and 5/5 on right    Special Tests:  Seated straight leg raise - negative on left and negative on right  Supine straight leg raise - negative on left and negative on right  Slump test - negative on left and negative on right  Provocation maneuvers exhibit no worsening of symptoms on left or on right.    DANO test - negative  FADIR test - negative  Log roll test - negative    HIP: bilateral   The affected hip is compared to the contralateral hip.    Observation:    There is no edema, erythema, or ecchymosis in the lumbosacral region.   There is no Trendelenburg sign on either side  No obvious pelvic obliquity while standing.    No midline skin abnormalities.  No atrophy noted in the lower limbs.    ROM:   Passive hip flexion to 120° bilaterally.    Passive hip internal rotation to 45° bilaterally.    Passive hip external rotation to 45° bilaterally.    Passive hip abduction to 45° bilaterally.    All motions above are without pain.    Tenderness To Palpation:  No tenderness at the ASIS, AIIS, PSIS, PIIS, iliac crest, pubic bones, ischial tuberosity.  No tenderness throughout the lumbar spine, iliolumbar region, or posterior pelvis.  + tenderness throughout the sacrum or piriformis  Mild tenderness over the greater trochanteric bursa or greater/lesser trochanters.  Mild tenderness at the glut attachments on the greater trochanter  Mild tenderness over proximal IT band or hip flexor musculature.    Strength Testing:  Hip flexion - 5/5  Hip extension - 5/5  Hip adduction - 5/5  Hip abduction - 5/5  Knee flexion - 5/5  Knee extension - 5/5    Special Tests:  Seated straight leg raise - negative  Supine straight leg raise - negative  Hamstring flexibility  symmetric    Log roll - negative  DANO - negative  FADIR - negative  Scour test - negative  No pain with posterior hip capsule compression    ASIS compression test - negative  SI drawer test - negative   Thigh thrust test - negative     Piriformis test (Bonnet's) - Positive   Ely's test - negative  Quadriceps flexibility symmetric.  Greg test - negative  [place] compression test - negative      Neurovascular Exam:  Normal gait without Trendelenburg.  Hamstring/gluteal firing pattern normal and symmetric.  2+ femoral, DP, and PT pulses BL.  No skin changes, no abnormal hair distribution.  Sensation intact to light touch throughout the obturator and medial/lateral/posterior femoral cutaneous nerves.  2+/4 reflexes at L4 and S1 dermatomes  Capillary refill intact to <2 seconds in all lower limb digits.       Posture:  Upright and Increased thoracic kyphosis  Gait: Non-antalgic    TART (Tissue texture abnormality, Asymmetry,  Restriction of motion and/or Tenderness) changes:    Head:     Cervical Spine  Thoracic Spine  Lumbar Spine   C1 Neutral T1 Neutral L1 NSLRR   C2 Neutral T2 Neutral L2 Neutral   C3 Neutral T3 Neutral L3 Neutral   C4 Neutral T4 Neutral L4 FRSL   C5 Neutral T5 Neutral L5 FRSL   C6 Neutral T6 Neutral     C7 Neutral T7 Neutral       T8 NSLRR       T9 NSLRR       T10 NSLRR       T11 NSLRR       T12 NSLRR       Ribs:  Exhalation restriction bilateral upper and middle ribcage    Upper Extremity:  Periscapular MFR bilateral    Abdomen:    Pelvis:  Innominate:Right anterior rotation  Pubic bone:Neutral    Sacrum:Left on Right sacral torsion    Lower Extremity:  External tibial torsion right      Key   F= Flexed   E = Extended   R = Rotated   N = Neutral   S = Sidebent   TTA = tissue texture abnormality   L/R/B (last letter) = left/right/bilateral   HTP = fascial herniated trigger point   TB = fascial trigger band   CD = fascial continuum distortion   MFR = myofascial restriction       ASSESSMENT:       ICD-10-CM ICD-9-CM   1. Mechanical low back pain  M54.59 724.2   2. Bilateral hip pain  M25.551 719.45    M25.552    3. Myalgia  M79.10 729.1   4. Leg length discrepancy  M21.70 736.81   5. Somatic dysfunction of lumbar region  M99.03 739.3   6. Somatic dysfunction of pelvis region  M99.05 739.5   7. Somatic dysfunction of upper extremity  M99.07 739.7   8. Somatic dysfunction of lower extremity  M99.06 739.6   9. Somatic dysfunction of thoracic region  M99.02 739.2   10. Somatic dysfunction of sacral region  M99.04 739.4   11. Somatic dysfunction of rib cage region  M99.08 739.8       PLAN:  1-2. Mechanical LBP/hip pain -   3-4. Myalgia/leg length -     - 48 yo presenting with buttock/posterolateral hip pain in the setting of somatic dysfunction and biomechanical restrictions resulting in myalgia along the hip external rotators, abductors, and extensors. He is active and exercises regularly and is looking for conservative measures to facilitate improved mobility and strength.     - Symptoms, exam, and imaging are most consistent with myalgias in the b/l piriformis and gluteal musculature.  We discussed the importance of decreasing inflammation and strengthening and stabilizing to help promote and maintain symptom improvement/resolution.  This is commonly accomplished with a short course of an anti-inflammatory and icing in addition to osteopathic manipulation, a home exercise program or physical therapy.    - Based on his description of pain/body language and somatic dysfunction identified on exam, I discussed osteopathic manipulation as a treatment option today. He consents to evaluation and treatment. See below.    - HEP for abdominal bracing, ant marches, diaphragmatic breathing, pelvic clocks 6-12, cervicothoracic mobility prescribed today. Handouts printed, provided, explained, and exercises were demonstrated as needed. Encouraged to do daily. [id number] HOME EXERCISE PROGRAM (HEP):  The patient was taught a  homegoing physical therapy regimen as described above by provider with assistance of sports medicine assistant. The patient demonstrated understanding of the exercises and proper technique of their execution. This process took 15 minutes.       5-11. Somatic dysfunction of lumbar, pelvis, sacral, thoracic, lower extremity, upper extremity, ribcage regions -     - OMT 7-8 regions. Oral consent obtained. Reviewed benefits and potential side effects. OMT indicated today due to signs and symptoms as well as local and remote somatic dysfunction findings and their related neurokinetic, lymphatic, fascial and/or arteriovenous body connections. OMT techniques used: Soft Tissue, Myofascial Release, Muscle Energy, and High Velocity Low Amplitude. Treatment was tolerated well. Improvement noted in segmental mobility post-treatment in dysfunctional regions. There were no adverse events and no complications immediately following treatment. Advised plenty of water to help alleviate soreness.      Future planning includes - possibly more OMT if helpful and if indicated     All questions were answered to the best of my ability and all concerns were addressed at this time.    Follow up in 6 weeks for above, or sooner if needed.      This note is dictated using the M*Modal Fluency Direct word recognition program. There are word recognition mistakes that are occasionally missed on review.

## 2025-03-25 DIAGNOSIS — B00.9 HSV INFECTION: ICD-10-CM

## 2025-03-25 RX ORDER — VALACYCLOVIR HYDROCHLORIDE 1 G/1
1000 TABLET, FILM COATED ORAL EVERY 12 HOURS
Qty: 14 TABLET | Refills: 0 | Status: SHIPPED | OUTPATIENT
Start: 2025-03-25 | End: 2025-04-01

## 2025-03-25 RX ORDER — OMEPRAZOLE 40 MG/1
40 CAPSULE, DELAYED RELEASE ORAL DAILY
Qty: 90 CAPSULE | Refills: 3 | Status: SHIPPED | OUTPATIENT
Start: 2025-03-25 | End: 2026-03-25

## 2025-05-16 ENCOUNTER — TELEPHONE (OUTPATIENT)
Dept: SPORTS MEDICINE | Facility: CLINIC | Age: 50
End: 2025-05-16
Payer: COMMERCIAL

## 2025-05-16 NOTE — TELEPHONE ENCOUNTER
Contacted patient regarding Dr. Mullen being out of office  and was able to schedule patient for sooner appointment. Understanding expressed of new appointment details.    Melani Leavitt, MS, OTC  Clinical Assistant to Dr. Ridge Mullen

## 2025-06-25 ENCOUNTER — PATIENT MESSAGE (OUTPATIENT)
Dept: INTERNAL MEDICINE | Facility: CLINIC | Age: 50
End: 2025-06-25
Payer: COMMERCIAL

## 2025-07-15 DIAGNOSIS — E78.2 MIXED HYPERLIPIDEMIA: ICD-10-CM

## 2025-07-15 RX ORDER — SIMVASTATIN 80 MG/1
80 TABLET, FILM COATED ORAL NIGHTLY
Qty: 90 TABLET | Refills: 3 | Status: SHIPPED | OUTPATIENT
Start: 2025-07-15

## 2025-08-05 ENCOUNTER — OFFICE VISIT (OUTPATIENT)
Dept: INTERNAL MEDICINE | Facility: CLINIC | Age: 50
End: 2025-08-05
Payer: COMMERCIAL

## 2025-08-05 ENCOUNTER — HOSPITAL ENCOUNTER (OUTPATIENT)
Dept: RADIOLOGY | Facility: HOSPITAL | Age: 50
Discharge: HOME | End: 2025-08-05
Attending: STUDENT IN AN ORGANIZED HEALTH CARE EDUCATION/TRAINING PROGRAM
Payer: COMMERCIAL

## 2025-08-05 VITALS
SYSTOLIC BLOOD PRESSURE: 139 MMHG | WEIGHT: 166.88 LBS | HEIGHT: 69 IN | HEART RATE: 52 BPM | DIASTOLIC BLOOD PRESSURE: 80 MMHG | BODY MASS INDEX: 24.72 KG/M2

## 2025-08-05 DIAGNOSIS — M54.12 CERVICAL RADICULOPATHY: ICD-10-CM

## 2025-08-05 DIAGNOSIS — F17.200 NICOTINE USE DISORDER: ICD-10-CM

## 2025-08-05 DIAGNOSIS — M79.671 RIGHT FOOT PAIN: ICD-10-CM

## 2025-08-05 DIAGNOSIS — E78.2 MIXED HYPERLIPIDEMIA: ICD-10-CM

## 2025-08-05 DIAGNOSIS — F10.11 H/O ALCOHOL ABUSE: ICD-10-CM

## 2025-08-05 DIAGNOSIS — Z00.00 ANNUAL PHYSICAL EXAM: Primary | ICD-10-CM

## 2025-08-05 DIAGNOSIS — Z00.00 HEALTHCARE MAINTENANCE: ICD-10-CM

## 2025-08-05 DIAGNOSIS — F17.200 SMOKER: ICD-10-CM

## 2025-08-05 DIAGNOSIS — K21.9 GASTROESOPHAGEAL REFLUX DISEASE, UNSPECIFIED WHETHER ESOPHAGITIS PRESENT: ICD-10-CM

## 2025-08-05 DIAGNOSIS — T63.444A BEE STING REACTION, UNDETERMINED INTENT, INITIAL ENCOUNTER: ICD-10-CM

## 2025-08-05 PROBLEM — J06.9 UPPER RESPIRATORY TRACT INFECTION: Status: RESOLVED | Noted: 2024-07-10 | Resolved: 2025-08-05

## 2025-08-05 PROCEDURE — 1159F MED LIST DOCD IN RCRD: CPT | Mod: CPTII,S$GLB,, | Performed by: STUDENT IN AN ORGANIZED HEALTH CARE EDUCATION/TRAINING PROGRAM

## 2025-08-05 PROCEDURE — 3079F DIAST BP 80-89 MM HG: CPT | Mod: CPTII,S$GLB,, | Performed by: STUDENT IN AN ORGANIZED HEALTH CARE EDUCATION/TRAINING PROGRAM

## 2025-08-05 PROCEDURE — 99999 PR PBB SHADOW E&M-EST. PATIENT-LVL V: CPT | Mod: PBBFAC,,, | Performed by: STUDENT IN AN ORGANIZED HEALTH CARE EDUCATION/TRAINING PROGRAM

## 2025-08-05 PROCEDURE — 73630 X-RAY EXAM OF FOOT: CPT | Mod: TC,50,PN

## 2025-08-05 PROCEDURE — 99396 PREV VISIT EST AGE 40-64: CPT | Mod: S$GLB,,, | Performed by: STUDENT IN AN ORGANIZED HEALTH CARE EDUCATION/TRAINING PROGRAM

## 2025-08-05 PROCEDURE — 3075F SYST BP GE 130 - 139MM HG: CPT | Mod: CPTII,S$GLB,, | Performed by: STUDENT IN AN ORGANIZED HEALTH CARE EDUCATION/TRAINING PROGRAM

## 2025-08-05 PROCEDURE — 3008F BODY MASS INDEX DOCD: CPT | Mod: CPTII,S$GLB,, | Performed by: STUDENT IN AN ORGANIZED HEALTH CARE EDUCATION/TRAINING PROGRAM

## 2025-08-05 PROCEDURE — 73630 X-RAY EXAM OF FOOT: CPT | Mod: 26,,, | Performed by: RADIOLOGY

## 2025-08-05 PROCEDURE — 1160F RVW MEDS BY RX/DR IN RCRD: CPT | Mod: CPTII,S$GLB,, | Performed by: STUDENT IN AN ORGANIZED HEALTH CARE EDUCATION/TRAINING PROGRAM

## 2025-08-05 PROCEDURE — 3044F HG A1C LEVEL LT 7.0%: CPT | Mod: CPTII,S$GLB,, | Performed by: STUDENT IN AN ORGANIZED HEALTH CARE EDUCATION/TRAINING PROGRAM

## 2025-08-05 RX ORDER — SIMVASTATIN 80 MG/1
80 TABLET, FILM COATED ORAL NIGHTLY
Qty: 90 TABLET | Refills: 3 | Status: SHIPPED | OUTPATIENT
Start: 2025-08-05

## 2025-08-05 RX ORDER — VARENICLINE TARTRATE 0.5 (11)-1
KIT ORAL
Qty: 1 EACH | Refills: 0 | Status: SHIPPED | OUTPATIENT
Start: 2025-08-05

## 2025-08-05 RX ORDER — OMEPRAZOLE 40 MG/1
40 CAPSULE, DELAYED RELEASE ORAL DAILY
Qty: 90 CAPSULE | Refills: 3 | Status: SHIPPED | OUTPATIENT
Start: 2025-08-05 | End: 2026-08-05

## 2025-08-05 RX ORDER — MELOXICAM 15 MG/1
15 TABLET ORAL DAILY PRN
Qty: 90 TABLET | Refills: 3 | Status: SHIPPED | OUTPATIENT
Start: 2025-08-05 | End: 2026-08-05

## 2025-08-05 RX ORDER — EPINEPHRINE 0.3 MG/.3ML
1 INJECTION SUBCUTANEOUS ONCE
Qty: 0.3 ML | Refills: 0 | Status: SHIPPED | OUTPATIENT
Start: 2025-08-05 | End: 2025-08-05

## 2025-08-05 NOTE — ASSESSMENT & PLAN NOTE
Cigarette smoking of 1 ppd  Patient is motivated about quitting smoking.    I am starting patient on Chantix and referring patient to the smoking cessation program.  I spent 15 minutes in clinic discussing smoking cessation with the patient.  We discussed benefits of quitting smoking, the process of preparing to quit, behavioral changes to help him quit, and medications that could be used for quitting.   I also provided resources for smoking cessation including a handout.

## 2025-08-05 NOTE — ASSESSMENT & PLAN NOTE
Lab Results   Component Value Date    CHOL 175 08/05/2025    CHOL 255 (H) 07/09/2024    CHOL 267 (H) 07/10/2023     Lab Results   Component Value Date    HDL 55 08/05/2025    HDL 51 07/09/2024    HDL 61 07/10/2023     Lab Results   Component Value Date    LDLCALC 103.0 08/05/2025    LDLCALC 182.8 (H) 07/09/2024    LDLCALC 187.8 (H) 07/10/2023     Lab Results   Component Value Date    TRIG 85 08/05/2025    TRIG 106 07/09/2024    TRIG 91 07/10/2023       Lab Results   Component Value Date    CHOLHDL 31.4 08/05/2025    CHOLHDL 20.0 07/09/2024    CHOLHDL 22.8 07/10/2023     Significant improvement.    I congratulated him for his efforts, for avoiding alcohol use, and for using his medication as prescribed.    We will continue same management

## 2025-08-05 NOTE — ASSESSMENT & PLAN NOTE
Chronic pain at the base of the right toe.    I am referring patient to podiatrist for evaluation.

## 2025-08-05 NOTE — PROGRESS NOTES
"Subjective:       Patient ID: James Royden Peabody IV is a 49 y.o. male.    Chief Complaint: Annual Exam    HPI    James Royden Peabody IV is a 49 y.o. male , English speaking, with a history of:  HLD  Alcohol abuse  Chronic active tobacco use  Cervical foraminal stenosis and BUE radiculopathy  BL carpal tunnel Sx  GERD  H/o esophageal stricture s/p dilation EGD in 2019  H/o colon polyps  Chronic right shoulder pain  H/o HSV2 infection      [Local Patient]  Originally from [facility]  Lives in: [place] [id number]       Patient comes to the clinic for for his annual physical exam.      Patient is happy and feels well.    MUSCULOSKELETAL:  He reports chronic right foot pain originating from an old toe injury sustained approximately 15-20 years ago when he ran over his toe with a trailer. He describes this injury as the "epicenter" of his musculoskeletal compensation and reports low back pain secondary to compensating for issues on his right side. Recently, he experienced a fall at work where he lost balance and fell onto concrete. He credits Pilates and Meloxicam for minimizing potential injury from the fall. He notes that approximately 90% of his physical problems have been resolved, with ongoing concerns about his right foot and low back discomfort. He is interested in consulting a podiatrist to address his toe injury and associated compensatory issues.    DIET AND EXERCISE:  He engages in Pilates exercise 4-5 days per week and is currently maintaining a weight between 160-165 lbs. He appears motivated about fitness and weight management and denies any significant exercise-related limitations, reporting feeling fine with current fitness routine.    THROAT SYMPTOMS:  He reports intermittent swallowing difficulties, occurring approximately 3 times in the last six months. These episodes are less severe compared to previous experiences. He denies associated cough or shortness of breath. He continues to take daily " PPI.    SUBSTANCE USE:  He currently smokes one pack of cigarettes per day and appears interested in smoking cessation program and potential medication assistance. He has been abstinent from alcohol for the past year and a half, with occasional sip of wine. He continues to use marijuana.    CURRENT MEDICATIONS:  He takes cholesterol medication consistently, omeprazole for throat scar tissue with improvement in swallowing issues, and maintains an EpiPen due to bee exposure at his workplace.    LABS:  Hemoglobin 14, kidney function tests normal, liver function tests normal, glucose level normal, and PSA negative.    Patient denies unintentional weight loss, chest pain, shortness for breath, cough.      No other complaints or concerns      Since last seen by me:      07/10/2024: Annual wellness visit   01/10/2024: Tobacco use disorder  7/10/23 AWV - establish care      Changes in health or medications: No    Specialists visits and recommendations:        H/o ER visits:   NO    H/o Hospitalizations:  NO    H/o falls: None     Life events / lifestyle:   Nothing new      Most recent laboratories reviewed:    Recent Labs   Lab 07/10/23  0827 07/09/24  0648 08/05/25  0717   WBC 6.34 10.53 7.66   Hemoglobin 14.9 14.1  --    HGB  --   --  13.9 L   Hematocrit 44.2 43.8  --    HCT  --   --  42.0    H 101 H 98   Platelet Count  --   --  248   Platelets 242 343  --        Recent Labs   Lab 07/10/23  0827 07/09/24  0648 08/05/25  0717   Glucose 92 85 85   Sodium 139 141 142   Potassium 4.5 4.2 4.6   BUN 14 14 15   Creatinine 0.8 0.8 0.9   Total Bilirubin 0.5 0.6  --    Bilirubin Total  --   --  0.7   AST 21 21 28   ALT 19 24 21       Recent Labs   Lab 07/10/23  0827 07/09/24  0648 08/05/25  0717   Hemoglobin A1C 5.2 5.4  --    Hemoglobin A1c  --   --  5.1       Recent Labs   Lab 07/10/23  0827 07/09/24  0648 08/05/25  0717   Cholesterol Total  --   --  175   Cholesterol 267 H 255 H  --    Triglycerides 91 106  --   "  Triglyceride  --   --  85   HDL 61 51  --    HDL Cholesterol  --   --  55   LDL Cholesterol 187.8 H 182.8 H 103.0   Non-HDL Cholesterol 206 204  --    Non HDL Cholesterol  --   --  120       Recent Labs   Lab 07/10/23  0827 08/05/25  0717   TSH 1.216  --    Prostate Specific Antigen  --  0.86   PSA, Screen 0.95  --                  Current medications:    Current Outpatient Medications:     EPINEPHrine (EPIPEN) 0.3 mg/0.3 mL AtIn, Inject 0.3 mLs (0.3 mg total) into the muscle once. for 1 dose, Disp: 0.3 mL, Rfl: 0    meloxicam (MOBIC) 15 MG tablet, Take 1 tablet (15 mg total) by mouth daily as needed for Pain., Disp: 90 tablet, Rfl: 3    omeprazole (PRILOSEC) 40 MG capsule, Take 1 capsule (40 mg total) by mouth once daily., Disp: 90 capsule, Rfl: 3    simvastatin (ZOCOR) 80 MG tablet, Take 1 tablet (80 mg total) by mouth every evening., Disp: 90 tablet, Rfl: 3    valACYclovir (VALTREX) 1000 MG tablet, Take 1 tablet (1,000 mg total) by mouth every 12 (twelve) hours. for 7 days, Disp: 14 tablet, Rfl: 0    varenicline tartrate (CHANTIX STARTING MONTH BOX) 0.5 mg (11)- 1 mg (42) tablet, Take one 0.5mg tab by mouth once daily X3 days,then increase to one 0.5mg tab twice daily X4 days,then increase to one 1mg tab twice daily, Disp: 1 each, Rfl: 0      Healthcare Maintenance:  Colon cancer screening:   Last Colonoscopy completed on 1/6/2022   EGD 2022  Vaccinations: Pneumonia (NO), Zoster (no), Tetanus 2016  COVID vaccination: completed x 3  Depression screening: PHQ2 score = 0     Annual visit approx. Month: July ROS  11-point review of systems done. Negative except for detailed in the HPI.  Redness of right great toe        Objective:      /80 (Patient Position: Sitting)   Pulse (!) 52   Ht 5' 9" (1.753 m)   Wt 75.7 kg (166 lb 14.2 oz)   BMI 24.65 kg/m²     Physical Exam   Physical Exam  Vitals and nursing note reviewed.   Constitutional:       Appearance: Normal appearance.   HENT:      Head: " Normocephalic and atraumatic.      Right Ear: Tympanic membrane normal.      Left Ear: Tympanic membrane normal.      Nose: Nose normal.      Mouth/Throat:      Mouth: Mucous membranes are moist.      Pharynx: Oropharynx is clear.   Eyes:      Extraocular Movements: Extraocular movements intact.      Conjunctiva/sclera: Conjunctivae normal.      Pupils: Pupils are equal, round, and reactive to light.   Cardiovascular:      Rate and Rhythm: Normal rate and regular rhythm.      Pulses: Normal pulses.      Heart sounds: Normal heart sounds.   Pulmonary:      Effort: Pulmonary effort is normal.      Breath sounds: Normal breath sounds.   Abdominal:      General: Bowel sounds are normal. There is no distension.      Palpations: Abdomen is soft.      Tenderness: There is no abdominal tenderness.   Musculoskeletal:         General: Normal range of motion.      Cervical back: Normal range of motion and neck supple.   Skin:     General: Skin is warm.   Small callus noted over the medial side of the right great toe  Neurological:      General: No focal deficit present.      Mental Status: Alert and oriented to person, place, and time. Mental status is at baseline.   Psychiatric:         Mood and Affect: Mood normal.           Assessment:       1. Annual physical exam  Assessment & Plan:  Patient is in overall good general health.  Stable medical conditions listed on the PMH.  Labs reviewed and patient notified.        2. Mixed hyperlipidemia  Assessment & Plan:  Lab Results   Component Value Date    CHOL 175 08/05/2025    CHOL 255 (H) 07/09/2024    CHOL 267 (H) 07/10/2023     Lab Results   Component Value Date    HDL 55 08/05/2025    HDL 51 07/09/2024    HDL 61 07/10/2023     Lab Results   Component Value Date    LDLCALC 103.0 08/05/2025    LDLCALC 182.8 (H) 07/09/2024    LDLCALC 187.8 (H) 07/10/2023     Lab Results   Component Value Date    TRIG 85 08/05/2025    TRIG 106 07/09/2024    TRIG 91 07/10/2023       Lab Results    Component Value Date    CHOLHDL 31.4 08/05/2025    CHOLHDL 20.0 07/09/2024    CHOLHDL 22.8 07/10/2023     Significant improvement.    I congratulated him for his efforts, for avoiding alcohol use, and for using his medication as prescribed.    We will continue same management    Orders:  -     simvastatin (ZOCOR) 80 MG tablet; Take 1 tablet (80 mg total) by mouth every evening.  Dispense: 90 tablet; Refill: 3    3. H/O alcohol abuse  Assessment & Plan:  On remission.    Patient is only drinking on special occasions, 1 alcoholic beverages occasionally.  I have praised him for his efforts and recommended to continue to abstain from alcohol consumption.      4. Right foot pain  Assessment & Plan:  Chronic pain at the base of the right toe.    I am referring patient to podiatrist for evaluation.    Orders:  -     Ambulatory referral/consult to Podiatry; Future; Expected date: 08/12/2025  -     X-Ray Foot Complete Bilateral; Future; Expected date: 08/05/2025    5. Cervical radiculopathy  Assessment & Plan:  Improved  Currently asymptomatic.  Continue physical activity.    NSAIDs as needed    Orders:  -     meloxicam (MOBIC) 15 MG tablet; Take 1 tablet (15 mg total) by mouth daily as needed for Pain.  Dispense: 90 tablet; Refill: 3    6. Bee sting reaction, undetermined intent, initial encounter  -     EPINEPHrine (EPIPEN) 0.3 mg/0.3 mL AtIn; Inject 0.3 mLs (0.3 mg total) into the muscle once. for 1 dose  Dispense: 0.3 mL; Refill: 0    7. Gastroesophageal reflux disease, unspecified whether esophagitis present  Assessment & Plan:  Improved.    Continue PPI as needed.    Orders:  -     omeprazole (PRILOSEC) 40 MG capsule; Take 1 capsule (40 mg total) by mouth once daily.  Dispense: 90 capsule; Refill: 3    8. Nicotine use disorder  Assessment & Plan:  Cigarette smoking of 1 ppd  Patient is motivated about quitting smoking.    I am starting patient on Chantix and referring patient to the smoking cessation program.  I  spent 15 minutes in clinic discussing smoking cessation with the patient.  We discussed benefits of quitting smoking, the process of preparing to quit, behavioral changes to help him quit, and medications that could be used for quitting.   I also provided resources for smoking cessation including a handout.        9. Smoker  Assessment & Plan:  Cigarette smoking of 1 ppd  Patient is motivated about quitting smoking.    I am starting patient on Chantix and referring patient to the smoking cessation program.  I spent 15 minutes in clinic discussing smoking cessation with the patient.  We discussed benefits of quitting smoking, the process of preparing to quit, behavioral changes to help him quit, and medications that could be used for quitting.   I also provided resources for smoking cessation including a handout.      Orders:  -     Ambulatory referral/consult to Smoking Cessation Program; Future; Expected date: 08/12/2025  -     varenicline tartrate (CHANTIX STARTING MONTH BOX) 0.5 mg (11)- 1 mg (42) tablet; Take one 0.5mg tab by mouth once daily X3 days,then increase to one 0.5mg tab twice daily X4 days,then increase to one 1mg tab twice daily  Dispense: 1 each; Refill: 0    10. Healthcare maintenance       Plan:       IMPRESSION:   Made significant lifestyle improvements, including weight loss, increased exercise, and reduced alcohol consumption. Cholesterol levels improved, likely due to lifestyle changes and medication adherence. Hemoglobin and other lab values WNL. Patient reports resolution of most physical problems, with remaining foot pain likely due to old injury. SpO2 at 96%, WNL but could improve with smoking cessation.     PLAN SUMMARY:   Continue EpiPen prescription for bee sting emergencies   Continue current medication regimen, including cholesterol medication and omeprazole Ordered XR Right Foot Referred patient to Dr. Go for podiatry evaluation   Initiated Chantix for smoking cessation,  prescription sent to [facility] pharmacy Referred to smoking cessation program   Advised to continue Pilates exercises 4-5 days per week       There are no Patient Instructions on file for this visit.     Problem list updated, medications reconciled, education provided  Lifestyle recommendations given  AVS printed, explained, and given to the patient.  RTC in :  1 year for annual wellness visit, labs ordered              ANDRES ALDRICH MD, MPH  Internal Medicine  [facility]  [facility]

## 2025-08-07 ENCOUNTER — OFFICE VISIT (OUTPATIENT)
Dept: PODIATRY | Facility: CLINIC | Age: 50
End: 2025-08-07
Payer: COMMERCIAL

## 2025-08-07 VITALS
DIASTOLIC BLOOD PRESSURE: 72 MMHG | BODY MASS INDEX: 24.72 KG/M2 | HEIGHT: 69 IN | SYSTOLIC BLOOD PRESSURE: 115 MMHG | HEART RATE: 65 BPM | WEIGHT: 166.88 LBS

## 2025-08-07 DIAGNOSIS — M24.573 EQUINUS CONTRACTURE OF ANKLE: Primary | ICD-10-CM

## 2025-08-07 DIAGNOSIS — M79.671 RIGHT FOOT PAIN: ICD-10-CM

## 2025-08-07 DIAGNOSIS — M20.5X1 HALLUX LIMITUS OF RIGHT FOOT: ICD-10-CM

## 2025-08-07 PROCEDURE — 3074F SYST BP LT 130 MM HG: CPT | Mod: CPTII,S$GLB,, | Performed by: PODIATRIST

## 2025-08-07 PROCEDURE — 99204 OFFICE O/P NEW MOD 45 MIN: CPT | Mod: 25,S$GLB,, | Performed by: PODIATRIST

## 2025-08-07 PROCEDURE — 3044F HG A1C LEVEL LT 7.0%: CPT | Mod: CPTII,S$GLB,, | Performed by: PODIATRIST

## 2025-08-07 PROCEDURE — 3008F BODY MASS INDEX DOCD: CPT | Mod: CPTII,S$GLB,, | Performed by: PODIATRIST

## 2025-08-07 PROCEDURE — 3078F DIAST BP <80 MM HG: CPT | Mod: CPTII,S$GLB,, | Performed by: PODIATRIST

## 2025-08-07 PROCEDURE — 99999 PR PBB SHADOW E&M-EST. PATIENT-LVL III: CPT | Mod: PBBFAC,,, | Performed by: PODIATRIST

## 2025-08-07 PROCEDURE — 1159F MED LIST DOCD IN RCRD: CPT | Mod: CPTII,S$GLB,, | Performed by: PODIATRIST

## 2025-08-07 PROCEDURE — 1160F RVW MEDS BY RX/DR IN RCRD: CPT | Mod: CPTII,S$GLB,, | Performed by: PODIATRIST

## 2025-08-07 RX ORDER — LIDOCAINE AND PRILOCAINE 25; 25 MG/G; MG/G
CREAM TOPICAL
Qty: 30 G | Refills: 3 | Status: SHIPPED | OUTPATIENT
Start: 2025-08-07

## 2025-08-07 NOTE — PROGRESS NOTES
Subjective:      Patient ID: James Royden Peabody IV is a 49 y.o. male.    Chief Complaint: Foot Pain (R foot)    Deep aching sharp pain in the in the big toe joint of the right foot.  Gradual onset, worsening chronically over the past several years.  This exacerbations gradual onset, worsening over the past few weeks with increased activity.  No prior medical treatment.  Self-treatment with different types of shoes have helped some over.  Time without resolution.  Not at goal    Review of Systems   Constitutional: Negative for chills, diaphoresis, fever, malaise/fatigue and night sweats.   Cardiovascular:  Negative for claudication, cyanosis, leg swelling and syncope.   Skin:  Negative for color change, dry skin, nail changes, rash, suspicious lesions and unusual hair distribution.   Musculoskeletal:  Positive for joint pain. Negative for falls, joint swelling, muscle cramps, muscle weakness and stiffness.   Gastrointestinal:  Negative for constipation, diarrhea, nausea and vomiting.   Neurological:  Negative for brief paralysis, disturbances in coordination, focal weakness, numbness, paresthesias, sensory change and tremors.           Objective:      Physical Exam  Constitutional:       General: He is not in acute distress.     Appearance: He is well-developed. He is not diaphoretic.   Cardiovascular:      Pulses:           Popliteal pulses are 2+ on the right side and 2+ on the left side.        Dorsalis pedis pulses are 2+ on the right side and 2+ on the left side.        Posterior tibial pulses are 2+ on the right side and 2+ on the left side.      Comments: Capillary refill 3 seconds all toes/distal feet, all toes/both feet warm to touch.      Negative lymphadenopathy bilateral popliteal fossa and tarsal tunnel.      Negavie lower extremity edema bilateral.    Musculoskeletal:      Right ankle: No swelling, deformity, ecchymosis or lacerations. Normal range of motion. Normal pulse.      Right Achilles Tendon:  Normal. No defects. Marx's test negative.      Comments: Pain to palpation and end range of motion right 1st mtpj with visible and palpable periarticular exostoses, and reduced range of motion with mild crepitus.      Partially reducible hallux malleus and hallux interphalangeus right more than left without evidence of acute trauma.    Ankle dorsiflexion decreased at <10 degrees bilateral with moderate increase with knee flexion bilateral.       Lymphadenopathy:      Lower Body: No right inguinal adenopathy. No left inguinal adenopathy.      Comments: Negative lymphadenopathy bilateral popliteal fossa and tarsal tunnel.    Negative lymphangitic streaking bilateral feet/ankles/legs.   Skin:     General: Skin is warm and dry.      Capillary Refill: Capillary refill takes 2 to 3 seconds.      Coloration: Skin is not pale.      Findings: No abrasion, bruising, burn, ecchymosis, erythema, laceration, lesion or rash.      Nails: There is no clubbing.      Comments: Skin is normal age and health appropriate color, turgor, texture, and temperature bilateral lower extremities without ulceration, hyperpigmentation, discoloration, masses nodules or cords palpated.  No ecchymosis, erythema, edema, or cardinal signs of infection bilateral lower extremities.      Neurological:      Mental Status: He is alert and oriented to person, place, and time.      Sensory: No sensory deficit.      Motor: No tremor, atrophy or abnormal muscle tone.      Gait: Gait normal.      Deep Tendon Reflexes:      Reflex Scores:       Patellar reflexes are 2+ on the right side and 2+ on the left side.       Achilles reflexes are 2+ on the right side and 2+ on the left side.     Comments: Negative tinel sign to percussion sural, superficial peroneal, deep peroneal, saphenous, and posterior tibial nerves right and left ankles and feet.     Psychiatric:         Behavior: Behavior is cooperative.           Assessment:       Encounter Diagnoses   Name  "Primary?    Right foot pain     Equinus contracture of ankle Yes    Hallux limitus of right foot          Plan:       Marito Brown" was seen today for foot pain.    Diagnoses and all orders for this visit:    Equinus contracture of ankle  -     LIDOcaine-prilocaine (EMLA) cream; Apply topically as needed.    Right foot pain  -     Ambulatory referral/consult to Podiatry  -     LIDOcaine-prilocaine (EMLA) cream; Apply topically as needed.    Hallux limitus of right foot  -     LIDOcaine-prilocaine (EMLA) cream; Apply topically as needed.      I counseled the patient on his conditions, their implications and medical management.        Patient will stretch the tendo achilles complex three times daily as demonstrated in the office.  Literature was dispensed illustrating proper stretching technique.    Patient will obtain over the counter arch supports and wear them in shoes whenever possible.  Athletic shoes intended for walking or running are usually best.    The patient was advised that NSAID-type medications have two very important potential side effects: gastrointestinal irritation including hemorrhage and renal injuries. He was asked to take the medication with food and to stop if he experiences any GI upset. I asked him to call for vomiting, abdominal pain or black/bloody stools. The patient expresses understanding of these issues and questions were answered.    Discussed conservative treatment with shoes of adequate dimensions, material, and style to alleviate symptoms and delay or prevent surgical intervention.    P.r.n. meloxicam.      Prescribed EMLA cream once nightly for pain relief to facilitate sleep.      Recommend over-the-counter gait plate or Perez's extension to minimize 1st MTP and IP motion in propulsion.          No follow-ups on file.        "

## 2025-08-21 ENCOUNTER — TELEPHONE (OUTPATIENT)
Dept: SMOKING CESSATION | Facility: CLINIC | Age: 50
End: 2025-08-21
Payer: COMMERCIAL

## (undated) DEVICE — SPLINT PLASTER FAST SET 5X30IN

## (undated) DEVICE — SOCKINETTE DOUBLE PLY 4X48IN

## (undated) DEVICE — GAUZE SPONGE 4X4 12PLY

## (undated) DEVICE — GLOVE BIOGEL SKINSENSE PI 7.5

## (undated) DEVICE — SLING ARM LARGE FOAM STRAP

## (undated) DEVICE — DRESSING LEUKOPLAST FLEX 1X3IN

## (undated) DEVICE — UNDERGLOVES BIOGEL PI SIZE 8

## (undated) DEVICE — UNDERPAD ULTRASORB 300LB 30X36

## (undated) DEVICE — SOL IRR SOD CHL .9% POUR

## (undated) DEVICE — SUT VICRYL 4-0 RB1 27IN UD

## (undated) DEVICE — BANDAGE MATRIX HK LOOP 2IN 5YD

## (undated) DEVICE — KIT ENDO CARPEL TUNNAL SINGLE

## (undated) DEVICE — SUT VICRYL 3-0 27 SH

## (undated) DEVICE — SUT 4/0 18IN ETHILON BL P3

## (undated) DEVICE — Device

## (undated) DEVICE — SYR 10CC LUER LOCK

## (undated) DEVICE — DRAPE STERI-DRAPE 1000 17X11IN

## (undated) DEVICE — BANDAGE ESMARK ELASTIC ST 4X9

## (undated) DEVICE — GLOVE BIOGEL PI MICRO SZ 7.5

## (undated) DEVICE — PAD CAST 2 IN X 4YDS STERILE

## (undated) DEVICE — KIT ANTIFOG W/SPONG & FLUID

## (undated) DEVICE — TOURNIQUET SB QC DP 18X4IN

## (undated) DEVICE — DRESSING XEROFORM NONADH 1X8IN

## (undated) DEVICE — APPLICATOR CHLORAPREP ORN 26ML

## (undated) DEVICE — COVER CAMERA OPERATING ROOM

## (undated) DEVICE — DRAPE STERI U-SHAPED 47X51IN

## (undated) DEVICE — CORD FOR BIPOLAR FORCEPS 12

## (undated) DEVICE — SUT 4-0 ETHILON 18 PS-2

## (undated) DEVICE — DRAPE U SPLIT SHEET 54X76IN